# Patient Record
Sex: MALE | Race: WHITE | Employment: OTHER | URBAN - METROPOLITAN AREA
[De-identification: names, ages, dates, MRNs, and addresses within clinical notes are randomized per-mention and may not be internally consistent; named-entity substitution may affect disease eponyms.]

---

## 2019-12-03 ENCOUNTER — DOCUMENTATION ONLY (OUTPATIENT)
Dept: CARE COORDINATION | Facility: CLINIC | Age: 69
End: 2019-12-03

## 2019-12-03 NOTE — TELEPHONE ENCOUNTER
RECORDS RECEIVED FROM: Lumbar pain/sciatic nerve pain, per pt. Referred by Dr. Delvin Urbina. Records/MRI with [facility]. Pt had lumbar surgery in 2001 with the [facility].   DATE RECEIVED: Dec 10, 2019   NOTES STATUS DETAILS   OFFICE NOTE from referring provider Care Everywhere Delvin Urbina MD     OFFICE NOTE from other specialist N/A    DISCHARGE SUMMARY from hospital N/A    DISCHARGE REPORT from the ER N/A    OPERATIVE REPORT In process    MEDICATION LIST Care Everywhere    IMPLANT RECORD/STICKER In process    LABS     CBC/DIFF N/A    CULTURES N/A    INJECTIONS DONE IN RADIOLOGY N/A    MRI received IN PACS  11/13/2019   ULTRA SOUND RECEIVED IN PACS  09/06/2019 05/13/2019   XRAYS (IMAGES & REPORTS) RECEIVED IN PACS  12/11/2018     TUMOR     PATHOLOGY  Slides & report N/A      12/03/19   3:05 PM   [facility] records scanned urgent to chart  Karina Galloway CMA    12/03/19   8:48 AM   Called [facility], they will fax op note and implant record.  [facility] no longer has spine images.  Called  and asked them to push images.  Karina Galloway CMA    12/05/2019  12:17 pm  RESOLVED IMAGING CALLING [place] ABOUT RECORDS. [facility]

## 2019-12-06 DIAGNOSIS — M54.50 LUMBAR PAIN: Primary | ICD-10-CM

## 2019-12-10 ENCOUNTER — DOCUMENTATION ONLY (OUTPATIENT)
Dept: ORTHOPEDICS | Facility: CLINIC | Age: 69
End: 2019-12-10

## 2019-12-10 ENCOUNTER — OFFICE VISIT (OUTPATIENT)
Dept: SURGERY | Facility: CLINIC | Age: 69
End: 2019-12-10
Payer: COMMERCIAL

## 2019-12-10 ENCOUNTER — ANESTHESIA EVENT (OUTPATIENT)
Dept: SURGERY | Facility: CLINIC | Age: 69
End: 2019-12-10

## 2019-12-10 ENCOUNTER — OFFICE VISIT (OUTPATIENT)
Dept: ORTHOPEDICS | Facility: CLINIC | Age: 69
End: 2019-12-10
Payer: COMMERCIAL

## 2019-12-10 ENCOUNTER — PRE VISIT (OUTPATIENT)
Dept: SURGERY | Facility: CLINIC | Age: 69
End: 2019-12-10

## 2019-12-10 ENCOUNTER — TELEPHONE (OUTPATIENT)
Dept: ORTHOPEDICS | Facility: CLINIC | Age: 69
End: 2019-12-10

## 2019-12-10 ENCOUNTER — ANCILLARY PROCEDURE (OUTPATIENT)
Dept: GENERAL RADIOLOGY | Facility: CLINIC | Age: 69
End: 2019-12-10
Attending: ORTHOPAEDIC SURGERY
Payer: COMMERCIAL

## 2019-12-10 ENCOUNTER — PRE VISIT (OUTPATIENT)
Dept: ORTHOPEDICS | Facility: CLINIC | Age: 69
End: 2019-12-10

## 2019-12-10 VITALS
RESPIRATION RATE: 16 BRPM | HEART RATE: 81 BPM | HEIGHT: 72 IN | SYSTOLIC BLOOD PRESSURE: 153 MMHG | OXYGEN SATURATION: 99 % | TEMPERATURE: 98.2 F | BODY MASS INDEX: 23.3 KG/M2 | DIASTOLIC BLOOD PRESSURE: 93 MMHG | WEIGHT: 172 LBS

## 2019-12-10 VITALS — WEIGHT: 175 LBS | HEIGHT: 72 IN | BODY MASS INDEX: 23.7 KG/M2

## 2019-12-10 DIAGNOSIS — M54.16 LUMBAR RADICULOPATHY: Primary | ICD-10-CM

## 2019-12-10 DIAGNOSIS — M54.16 LUMBAR RADICULOPATHY: ICD-10-CM

## 2019-12-10 DIAGNOSIS — M48.062 SPINAL STENOSIS OF LUMBAR REGION WITH NEUROGENIC CLAUDICATION: ICD-10-CM

## 2019-12-10 DIAGNOSIS — Z01.818 PREOP EXAMINATION: ICD-10-CM

## 2019-12-10 LAB
ANION GAP SERPL CALCULATED.3IONS-SCNC: 3 MMOL/L (ref 3–14)
BUN SERPL-MCNC: 25 MG/DL (ref 7–30)
CALCIUM SERPL-MCNC: 9.2 MG/DL (ref 8.5–10.1)
CHLORIDE SERPL-SCNC: 106 MMOL/L (ref 94–109)
CO2 SERPL-SCNC: 29 MMOL/L (ref 20–32)
CREAT SERPL-MCNC: 0.93 MG/DL (ref 0.66–1.25)
ERYTHROCYTE [DISTWIDTH] IN BLOOD BY AUTOMATED COUNT: 12.8 % (ref 10–15)
GFR SERPL CREATININE-BSD FRML MDRD: 81 ML/MIN/{1.73_M2}
GLUCOSE SERPL-MCNC: 95 MG/DL (ref 70–99)
HCT VFR BLD AUTO: 43.2 % (ref 40–53)
HGB BLD-MCNC: 14.5 G/DL (ref 13.3–17.7)
MCH RBC QN AUTO: 33 PG (ref 26.5–33)
MCHC RBC AUTO-ENTMCNC: 33.6 G/DL (ref 31.5–36.5)
MCV RBC AUTO: 98 FL (ref 78–100)
MRSA DNA SPEC QL NAA+PROBE: NEGATIVE
PLATELET # BLD AUTO: 199 10E9/L (ref 150–450)
POTASSIUM SERPL-SCNC: 4.4 MMOL/L (ref 3.4–5.3)
RBC # BLD AUTO: 4.39 10E12/L (ref 4.4–5.9)
SODIUM SERPL-SCNC: 137 MMOL/L (ref 133–144)
SPECIMEN SOURCE: NORMAL
WBC # BLD AUTO: 5.6 10E9/L (ref 4–11)

## 2019-12-10 RX ORDER — MULTIPLE VITAMINS W/ MINERALS TAB 9MG-400MCG
1 TAB ORAL EVERY MORNING
COMMUNITY

## 2019-12-10 RX ORDER — ACETAMINOPHEN 325 MG/1
325-650 TABLET ORAL EVERY 6 HOURS PRN
Status: ON HOLD | COMMUNITY
End: 2020-06-18

## 2019-12-10 RX ORDER — GABAPENTIN 300 MG/1
300 CAPSULE ORAL AT BEDTIME
Status: ON HOLD | COMMUNITY
Start: 2019-11-04 | End: 2020-06-19

## 2019-12-10 RX ORDER — SODIUM PHOSPHATE,MONO-DIBASIC 19G-7G/118
1 ENEMA (ML) RECTAL
COMMUNITY

## 2019-12-10 RX ORDER — COVID-19 ANTIGEN TEST
220 KIT MISCELLANEOUS 2 TIMES DAILY WITH MEALS
Status: ON HOLD | COMMUNITY
Start: 2013-04-25 | End: 2020-06-19

## 2019-12-10 RX ORDER — LIDOCAINE 4 G/G
1 PATCH TOPICAL EVERY 24 HOURS
COMMUNITY
End: 2019-12-11

## 2019-12-10 SDOH — HEALTH STABILITY: MENTAL HEALTH: HOW OFTEN DO YOU HAVE 6 OR MORE DRINKS ON ONE OCCASION?: DAILY OR ALMOST DAILY

## 2019-12-10 SDOH — HEALTH STABILITY: MENTAL HEALTH: HOW MANY STANDARD DRINKS CONTAINING ALCOHOL DO YOU HAVE ON A TYPICAL DAY?: 3 OR 4

## 2019-12-10 ASSESSMENT — MIFFLIN-ST. JEOR
SCORE: 1577.06
SCORE: 1586.79

## 2019-12-10 ASSESSMENT — ENCOUNTER SYMPTOMS
BACK PAIN: 1
STIFFNESS: 1
DEPRESSION: 1
INSOMNIA: 0
NERVOUS/ANXIOUS: 1
DECREASED CONCENTRATION: 1
PANIC: 0

## 2019-12-10 ASSESSMENT — LIFESTYLE VARIABLES: TOBACCO_USE: 1

## 2019-12-10 ASSESSMENT — PAIN SCALES - GENERAL: PAINLEVEL: MODERATE PAIN (4)

## 2019-12-10 ASSESSMENT — PATIENT HEALTH QUESTIONNAIRE - PHQ9
SUM OF ALL RESPONSES TO PHQ QUESTIONS 1-9: 12
SUM OF ALL RESPONSES TO PHQ QUESTIONS 1-9: 12
10. IF YOU CHECKED OFF ANY PROBLEMS, HOW DIFFICULT HAVE THESE PROBLEMS MADE IT FOR YOU TO DO YOUR WORK, TAKE CARE OF THINGS AT HOME, OR GET ALONG WITH OTHER PEOPLE: SOMEWHAT DIFFICULT

## 2019-12-10 NOTE — PATIENT INSTRUCTIONS
Preparing for Your Surgery      Name:  Tyson Fregoso   MRN:  [id number]   :  1950   Today's Date:  12/10/2019     Arriving for surgery:  Surgery date:  2020  Arrival time:  10:15 am  Please come to:     [facility] [address]  [address] [place]  [address]    - parking is available in front of [facility] from 5:15AM to 8:00PM. If you prefer, park your car in the Green Lot.    -Proceed to the 3rd floor, check in at the Adult Surgery Waiting Lounge. [phone]    If an escort is needed stop at the Information Desk in the lobby. Inform the information person that you are here for surgery. An escort to the Adult Surgery Waiting Lounge will be provided.    What can I eat or drink?  -  You may have solid food or milk products until 8 hours prior to your surgery.(4 am)  -  You may have water, apple juice or 7up/Sprite until 2 hours prior to your surgery.(until 10:15 am arrival time)    Which medicines can I take?        Stop Aspirin, vitamins and supplements one week prior to surgery.      Hold Ibuprofen for 24 hours and/or Naproxen for 48 hours prior to surgery.     -  Please take these medications the day of surgery:  NONE      How do I prepare myself?  -  Take two showers: one the night before surgery; and one the morning of surgery.         Use Scrubcare or Hibiclens to wash from neck down, leave soap on your skin for up to one minute.        Do not get soap in your eyes or ears.  You may use your own shampoo and conditioner; no other hair products.   -  Do NOT use lotion, powder, deodorant, or antiperspirant the day of your surgery.  -  Do NOT wear any makeup, fingernail polish or jewelry.  - Do not bring your own medications to the hospital, except for inhalers and eye   drops.  -  Bring your ID and insurance card.        Questions or Concerns:  -Please call the Pre Admission Nursing Office at [phone].         -For questions after  surgery please call your surgeons office.       AFTER YOUR SURGERY  Breathing exercises   Breathing exercises help you recover faster. Take deep breaths and let the air out slowly. This will:     Help you wake up after surgery.    Help prevent complications like pneumonia.  Preventing complications will help you go home sooner.   We may give you a breathing device (incentive spirometer) to encourage you to breathe deeply.   Nausea and vomiting   You may feel sick to your stomach after surgery; if so, let your nurse know.    Pain control:  After surgery, you may have pain. Our goal is to help you manage your pain. Pain medicine will help you feel comfortable enough to do activities that will help you heal.  These activities may include breathing exercises, walking and physical therapy.   To help your health care team treat your pain we will ask: 1) If you have pain  2) where it is located 3) describe your pain in your words  Methods of pain control include medications given by mouth, vein or by nerve block for some surgeries.  Sequential Compression Device (SCD) or Pneumo Boots:  You may need to wear SCD S on your legs or feet. These are wraps connected to a machine that pumps in air and releases it. The repeated pumping helps prevent blood clots from forming.

## 2019-12-10 NOTE — NURSING NOTE
Teaching Flowsheet   Relevant Diagnosis: L3-L5 Decompression  Teaching Topic: Pre op teaching     Person(s) involved in teaching:   Patient     Motivation Level:  Asks Questions: Yes  Eager to Learn: Yes  Cooperative: Yes  Receptive (willing/able to accept information): Yes  Any cultural factors/Alevism beliefs that may influence understanding or compliance? No       Patient demonstrates understanding of the following:  Reason for the appointment, diagnosis and treatment plan: Yes  Knowledge of proper use of medications and conditions for which they are ordered (with special attention to potential side effects or drug interactions): Yes  Which situations necessitate calling provider and whom to contact: Yes       Teaching Concerns Addressed: RN discussed all aspects of pre op surgery including pre op shower, location, NPO status and post surgery pain mgmt. Obtained MRSA sample. Right nares. Sent to maria g Hernandez schedule surgery dates and PAC appt. Patient has low tolerance with hard narcotics and expressed that he would rather just use Tynelol for pain mgmt. RN provided education with pain mgmt. RN also explained to patient should the MRSA swab comes back positive, RN will call patient to give him instructions. Patient has no further questions.     Proper use and care of meds (medical equip, care aids, etc.): Yes  Nutritional needs and diet plan: Yes  Pain management techniques: Yes  Wound Care: Yes  How and/when to access community resources: Yes     Instructional Materials Used/Given: Pre op packet and antibacterial soap.     Time spent with patient: 15 minutes.

## 2019-12-10 NOTE — PROGRESS NOTES
Spine Surgery Consultation    REFERRING PHYSICIAN: Delvin Urbina   PRIMARY CARE PHYSICIAN: No primary care provider on file.           Chief Complaint:   Consult (lumbar and sciatic nerve pain )      History of Present Illness:  Symptom Profile Including: location of symptoms, onset, severity, exacerbating/alleviating factors, previous treatments:        Tyson Fregoso is a 69 year old male who presents today for evaluation of left L4 and L5 distribution radiculopathy.  He thinks the symptoms have been going on for about a year, but progressively worsening particularly over the last 2 to 3 months.  He cannot pinpoint an exact event that started it.  It seemed to come on gradually.  It consists of a burning and tingling type pain down the lateral leg down the front of the knee and then down the medial leg as well as onto the top of the foot.  It is particularly worse when he is standing and walking.  He says he cannot stand to live this way anymore.  He is done physical therapy within the last few months and this seemed to exacerbate the symptoms.  He also did a series of epidural injections and this did not really help him.  He is tried Tylenol and anti-inflammatories without much relief.  He feels like the left leg is weak.    Of note he had a previous right sided microdiscectomy in 2001 for acute foot drop from a large disc herniation.  He is not really sure which level this was but he notes that it was a miraculous surgery and it really helped him..          Past Medical History:   No past medical history on file.         Past Surgical History:   No past surgical history on file.         Social History:     Social History     Tobacco Use     Smoking status: Never Smoker     Smokeless tobacco: Never Used   Substance Use Topics     Alcohol use: Not on file            Family History:   No family history on file.         Allergies:   No Known Allergies         Medications:     Current Outpatient  Medications   Medication     gabapentin (NEURONTIN) 300 MG capsule     naproxen sodium 220 MG capsule     No current facility-administered medications for this visit.              Review of Systems:     A 10 point ROS was performed and reviewed. Specific responses to these questions are noted at the end of the document.         Physical Exam:   Vitals: Ht 1.829 m (6')   Wt 79.4 kg (175 lb)   BMI 23.73 kg/m    Constitutional: awake, alert, cooperative, no apparent distress, appears stated age.    Eyes: The sclera are white.  Ears, Nose, Throat: The trachea is midline.  Psychiatric: The patient has a normal affect.  Respiratory: breathing non-labored  Cardiovascular: The extremities are warm and perfused.  Skin: no obvious rashes or lesions.  Musculoskeletal, Neurologic, and Spine:            Lumbar Spine:    Appearance - No gross stepoffs or deformities    Motor -     L2-3: Hip flexion 5/5 R and 5/5 L strength          L3/4:  Knee extension R 5/5 and L 5/5 strength         L4/5:  Foot dorsiflexion R 5/5 L 4/5 and       EHL dorsiflexion R 4/5 L 3/5 strength         S1:  Plantarflexion/Peroneal Muscles  R 5/5 and L 5/5 strength    Sensation: intact to light touch L3-S1 distribution BLE, diminished left L4 and L5,     Positive left strait leg raise      Neurologic:      REFLEXES Left Right                  Patella 1+ 1+   Ankle jerk 1+ 1+   Babinski No upgoing great toe No upgoing great toe   Clonus 0 beats 0 beats     Hip Exam:  No pain with hip log roll and no tenderness over the greater trochanters.    Alignment:  Patient stands with a neutral standing sagittal balance.           Imaging:   We ordered and independently reviewed new radiographs at this clinic visit. The results were discussed with the patient.  Findings include:    Standing lumbar radiographs December 10, 2019 show severe multilevel degenerative changes across heavily visualized segment particularly at L5-S1 where there is complete disc space height  loss     lumbar MRI November 13, 2019 again shows multilevel lumbar spondylosis severe left lateral recess stenosis at L3-4 in the setting of paracentral disc herniation and facet hypertrophy causes severe impingement of the traversing left L4 nerve root moderate right lateral recess stenosis at L3-4 as well moderate left stenosis at L4-5 from facet joint hypertrophy although the worst area does seem to be the L3-4 segment moderate to severe right-sided foraminal stenosis L3-4 and L4-5 not on the left             Assessment and Plan:   Assessment:  69 year old male with L3-4 central and lateral recess stenosis as well as left paracentral disc herniation, and left lateral recess stenosis at L4-5.  He does have right-sided foraminal stenosis L3-4 and L4-5, but no right leg symptoms.     Plan:  1. At this point the patient has failed conservative management with therapy injections and oral medications including Tylenol and anti-inflammatories.  He feels quite disabled with the symptoms.  Based on this, I recommended a left L3-4 discectomy and L3-4 decompression as well as a left L4-5 hemilaminotomy to address the lateral recess stenosis at those levels.  Although he does have right-sided foraminal stenosis, he is not having any right leg symptoms and thus I do not think it is warranted to do a fusion at this time.  2. Risks of this surgery include risk of infection, risk of dural tear resulting in CSF leak which might result in headaches, or possible need for lumbar drain, or possible revision surgery in the setting of a persistent leak. Risk of seroma or hematoma requiring revision surgery. Possible nerve root injury resulting in numbness weakness or paralysis into the leg. Possible radiculitis which could result in similar symptoms or could result in significant neurogenic type pain into the leg. Risk of incomplete decompression which might require revision surgery in the future.  Risk of pars fracture or  postoperative instability requiring conversion to a fusion in the future. Risk of adjacent segment problems requiring surgery in the future. Risk of incomplete relief of symptoms possibly requiring revision surgery in the future. There is a risk of blood clots in the legs or the lungs.  Furthermore, although rare, there are risks of major vessel or major organ injury from the surgery, and risks of the anesthetic including stroke heart attack and death.  3. We talked about the expected recovery and I explained 6 to 12 weeks of decreased activities to try and let the muscles heal.  I also explained the risk of needing a fusion in the future if his arthritis or foraminal stenosis were to worsen, but he is very opposed to a fusion and I think given that he does not have any right leg symptoms and there is no instability that it is very reasonable to proceed with a decompression alone at this time.  4. He is a  of this procedure and the recovery given that he had a previous discectomy.  He would like to proceed and I will get things arranged for him.      Respectfully,  Jan Ward MD  Spine Surgery  [facility]      Answers for HPI/ROS submitted by the patient on 12/10/2019   If you checked off any problems, how difficult have these problems made it for you to do your work, take care of things at home, or get along with other people?: Somewhat difficult  PHQ9 TOTAL SCORE: 12

## 2019-12-10 NOTE — TELEPHONE ENCOUNTER
FUTURE VISIT INFORMATION      SURGERY INFORMATION:    Date: 20    Location: [facility] OR    Surgeon:  Jan Ward    Anesthesia Type:  General    RECORDS REQUESTED FROM:       Primary Care Provider: Logan Cordova MD- [facility]    Most recent EKG+ Tracin14- [facility]- requested tracing

## 2019-12-10 NOTE — LETTER
12/10/2019       RE: Tyson Fregoso  [address] [id number]     Dear Colleague,    Thank you for referring your patient, Tyson Fregoso, to the [facility] at [facility]. Please see a copy of my visit note below.    Spine Surgery Consultation    REFERRING PHYSICIAN: Delvin Urbina   PRIMARY CARE PHYSICIAN: No primary care provider on file.           Chief Complaint:   Consult (lumbar and sciatic nerve pain )      History of Present Illness:  Symptom Profile Including: location of symptoms, onset, severity, exacerbating/alleviating factors, previous treatments:        Tyson Fregoso is a 69 year old male who presents today for evaluation of left L4 and L5 distribution radiculopathy.  He thinks the symptoms have been going on for about a year, but progressively worsening particularly over the last 2 to 3 months.  He cannot pinpoint an exact event that started it.  It seemed to come on gradually.  It consists of a burning and tingling type pain down the lateral leg down the front of the knee and then down the medial leg as well as onto the top of the foot.  It is particularly worse when he is standing and walking.  He says he cannot stand to live this way anymore.  He is done physical therapy within the last few months and this seemed to exacerbate the symptoms.  He also did a series of epidural injections and this did not really help him.  He is tried Tylenol and anti-inflammatories without much relief.  He feels like the left leg is weak.    Of note he had a previous right sided microdiscectomy in 2001 for acute foot drop from a large disc herniation.  He is not really sure which level this was but he notes that it was a miraculous surgery and it really helped him..          Past Medical History:   No past medical history on file.         Past Surgical History:   No past surgical history on file.         Social History:      Social History     Tobacco Use     Smoking status: Never Smoker     Smokeless tobacco: Never Used   Substance Use Topics     Alcohol use: Not on file            Family History:   No family history on file.         Allergies:   No Known Allergies         Medications:     Current Outpatient Medications   Medication     gabapentin (NEURONTIN) 300 MG capsule     naproxen sodium 220 MG capsule     No current facility-administered medications for this visit.              Review of Systems:     A 10 point ROS was performed and reviewed. Specific responses to these questions are noted at the end of the document.         Physical Exam:   Vitals: Ht 1.829 m (6')   Wt 79.4 kg (175 lb)   BMI 23.73 kg/m     Constitutional: awake, alert, cooperative, no apparent distress, appears stated age.    Eyes: The sclera are white.  Ears, Nose, Throat: The trachea is midline.  Psychiatric: The patient has a normal affect.  Respiratory: breathing non-labored  Cardiovascular: The extremities are warm and perfused.  Skin: no obvious rashes or lesions.  Musculoskeletal, Neurologic, and Spine:            Lumbar Spine:    Appearance - No gross stepoffs or deformities    Motor -     L2-3: Hip flexion 5/5 R and 5/5 L strength          L3/4:  Knee extension R 5/5 and L 5/5 strength         L4/5:  Foot dorsiflexion R 5/5 L 4/5 and       EHL dorsiflexion R 4/5 L 3/5 strength         S1:  Plantarflexion/Peroneal Muscles  R 5/5 and L 5/5 strength    Sensation: intact to light touch L3-S1 distribution BLE, diminished left L4 and L5,     Positive left strait leg raise      Neurologic:      REFLEXES Left Right                  Patella 1+ 1+   Ankle jerk 1+ 1+   Babinski No upgoing great toe No upgoing great toe   Clonus 0 beats 0 beats     Hip Exam:  No pain with hip log roll and no tenderness over the greater trochanters.    Alignment:  Patient stands with a neutral standing sagittal balance.           Imaging:   We ordered and independently  reviewed new radiographs at this clinic visit. The results were discussed with the patient.  Findings include:    Standing lumbar radiographs December 10, 2019 show severe multilevel degenerative changes across heavily visualized segment particularly at L5-S1 where there is complete disc space height loss     lumbar MRI November 13, 2019 again shows multilevel lumbar spondylosis severe left lateral recess stenosis at L3-4 in the setting of paracentral disc herniation and facet hypertrophy causes severe impingement of the traversing left L4 nerve root moderate right lateral recess stenosis at L3-4 as well moderate left stenosis at L4-5 from facet joint hypertrophy although the worst area does seem to be the L3-4 segment moderate to severe right-sided foraminal stenosis L3-4 and L4-5 not on the left             Assessment and Plan:   Assessment:  69 year old male with L3-4 central and lateral recess stenosis as well as left paracentral disc herniation, and left lateral recess stenosis at L4-5.  He does have right-sided foraminal stenosis L3-4 and L4-5, but no right leg symptoms.     Plan:  1. At this point the patient has failed conservative management with therapy injections and oral medications including Tylenol and anti-inflammatories.  He feels quite disabled with the symptoms.  Based on this, I recommended a left L3-4 discectomy and L3-4 decompression as well as a left L4-5 hemilaminotomy to address the lateral recess stenosis at those levels.  Although he does have right-sided foraminal stenosis, he is not having any right leg symptoms and thus I do not think it is warranted to do a fusion at this time.  2. Risks of this surgery include risk of infection, risk of dural tear resulting in CSF leak which might result in headaches, or possible need for lumbar drain, or possible revision surgery in the setting of a persistent leak. Risk of seroma or hematoma requiring revision surgery. Possible nerve root injury  resulting in numbness weakness or paralysis into the leg. Possible radiculitis which could result in similar symptoms or could result in significant neurogenic type pain into the leg. Risk of incomplete decompression which might require revision surgery in the future.  Risk of pars fracture or postoperative instability requiring conversion to a fusion in the future. Risk of adjacent segment problems requiring surgery in the future. Risk of incomplete relief of symptoms possibly requiring revision surgery in the future. There is a risk of blood clots in the legs or the lungs.  Furthermore, although rare, there are risks of major vessel or major organ injury from the surgery, and risks of the anesthetic including stroke heart attack and death.  3. We talked about the expected recovery and I explained 6 to 12 weeks of decreased activities to try and let the muscles heal.  I also explained the risk of needing a fusion in the future if his arthritis or foraminal stenosis were to worsen, but he is very opposed to a fusion and I think given that he does not have any right leg symptoms and there is no instability that it is very reasonable to proceed with a decompression alone at this time.  4. He is a  of this procedure and the recovery given that he had a previous discectomy.  He would like to proceed and I will get things arranged for him.      Respectfully,  Jan Ward MD  Spine Surgery  [facility]      Answers for HPI/ROS submitted by the patient on 12/10/2019   If you checked off any problems, how difficult have these problems made it for you to do your work, take care of things at home, or get along with other people?: Somewhat difficult  PHQ9 TOTAL SCORE: 12      Again, thank you for allowing me to participate in the care of your patient.      Sincerely,    Jan Ward MD

## 2019-12-10 NOTE — ANESTHESIA PREPROCEDURE EVALUATION
Anesthesia Pre-Procedure Evaluation    Patient: Tyson Fregoso   MRN:     [id number] Gender:   male   Age:    69 year old :      1950        Preoperative Diagnosis: * No surgery found *        No past medical history on file.   Past Surgical History:   Procedure Laterality Date     microdiscectomy      L4-5     SHOULDER SURGERY Bilateral     arthroscopies (Left , right )          Anesthesia Evaluation     . Pt has had prior anesthetic. Type: General, Regional and MAC           ROS/MED HX    ENT/Pulmonary: Comment: 8 pk yr cigarette hx, quit     9 yr hx smoking cigars, quit     (+)tobacco use, Past use 1/2 x17 yrs packs/day  , . .   (-) asthma and sleep apnea   Neurologic: Comment: Hx drop foot prior to lumbar surgery in     (+)neuropathy - LEs, lumbar radiculopathy,     Cardiovascular:     (+) Dyslipidemia, ----. : . . . :. . Previous cardiac testing date:results:date: results:ECG reviewed date: results:Sinus kathy, 52 bpm, otherwise normal date: results:          METS/Exercise Tolerance: Comment: Able to walk one mile. Was more active until a month ago when lumbar radiculopathy worsened. Stretches daily. Plays golf frequently in warmer weather. 3 - Able to walk 1-2 blocks without stopping   Hematologic:  - neg hematologic  ROS       Musculoskeletal: Comment: S/P L4-5 microdiscectomy     Acute lumbar radiculopathy    S/P B/L shoulder arthroscopies    Chronic left knee pain    Left clavicle congenital deformity        GI/Hepatic:  - neg GI/hepatic ROS       Renal/Genitourinary:  - ROS Renal section negative       Endo: Comment: Had steroid injection in hip in spring & fall. Also had 5 day course of prednisone.        Psychiatric:  - neg psychiatric ROS       Infectious Disease:  - neg infectious disease ROS       Malignancy:      - no malignancy   Other:    (+) H/O Chronic Pain,                       PHYSICAL EXAM:   Mental Status/Neuro: A/A/O; Age Appropriate   Airway:  "Facies: Feasible  Mallampati: I  Mouth/Opening: Full  TM distance: > 6 cm  Neck ROM: Full   Respiratory: Auscultation: CTAB     Resp. Rate: Normal     Resp. Effort: Normal      CV: Rhythm: Regular  Rate: Age appropriate  Heart: Normal Sounds  Edema: None   Comments:      Dental: Normal Dentition                LABS:  CBC:   Lab Results   Component Value Date    HGB 14.6 10/04/2007     BMP:   Lab Results   Component Value Date    POTASSIUM 4.1 10/04/2007     COAGS: No results found for: PTT, INR, FIBR  POC: No results found for: BGM, HCG, HCGS  OTHER: No results found for: PH, LACT, A1C, MARCO, PHOS, MAG, ALBUMIN, PROTTOTAL, ALT, AST, GGT, ALKPHOS, BILITOTAL, BILIDIRECT, LIPASE, AMYLASE, CHAVA, TSH, T4, T3, CRP, SED     Preop Vitals    BP Readings from Last 3 Encounters:   12/10/19 (!) 153/93    Pulse Readings from Last 3 Encounters:   12/10/19 81      Resp Readings from Last 3 Encounters:   12/10/19 16    SpO2 Readings from Last 3 Encounters:   12/10/19 99%      Temp Readings from Last 1 Encounters:   12/10/19 98.2  F (36.8  C) (Oral)    Ht Readings from Last 1 Encounters:   12/10/19 1.835 m (6' 0.24\")      Wt Readings from Last 1 Encounters:   12/10/19 78 kg (172 lb)    Estimated body mass index is 23.17 kg/m  as calculated from the following:    Height as of this encounter: 1.835 m (6' 0.24\").    Weight as of this encounter: 78 kg (172 lb).     LDA:        JZG FV AN PLAN NO PONV RULE       PAC Discussion and Assessment    ASA Classification: 2  Case is suitable for: [place]  Anesthetic techniques and relevant risks discussed: GA  Invasive monitoring and risk discussed: No  Types:   Possibility and Risk of blood transfusion discussed: No  NPO instructions given:   Additional anesthetic preparation and risks discussed:   Needs early admission to pre-op area:   Other:     PAC Resident/NP Anesthesia Assessment:  Tyson Fregoso is a 69 year old male scheduled to undergo Lumbar 3 to 4 decompression and discectomy " and left Lumbar 4-5 Hemilaminectomy with Jan Ward MD on 1/8/20 at [facility] for treatment of Lumbar radiculopathy and Spinal stenosis of lumbar region with neurogenic claudication.    Pt has had prior anesthetic. Type: General, Regional and MAC - no complications per pt    He has the following specific operative considerations:   # AMBER 2/8 = low risk  # VTE risk: 0.5%  # Risk of PONV score = 2.  If > 2, anti-emetic intervention recommended.    # Anesthesia considerations:  Refer to PAC assessment in anesthesia records    CARDIAC: METS 3,  Able to walk one mile. Was more active until a month ago when lumbar radiculopathy worsened. Stretches daily. Plays golf frequently in warmer weather.     # RCRI : No serious cardiac risks.  0.4% risk of major adverse cardiac event.       PULMONARY:     # Former smoker, 8 pk yr hx, quit 1992 (cigarettes)    # Former smoker, 9 yr hx, quit 2018 (cigars)    # No asthma or inhaler use    GI: no GERD      ENDO: BMI 24    # No DM    ORTHO: full neck ROM, no TMJ    # s/p L4-5 microdiscectomy 2001    # Acute lumbar radiculopathy    Patient is optimized and is acceptable candidate for the proposed procedure, provided labs today are within acceptable range. No further diagnostic evaluation is needed.        Reviewed and Signed by PAC Mid-Level Provider/Resident  Mid-Level Provider/Resident: Crista Paul PA-C  Date: 12/10/19  Time: 1531    Attending Anesthesiologist Anesthesia Assessment:        Anesthesiologist:   Date:   Time:   Pass/Fail:   Disposition:     PAC Pharmacist Assessment:        Pharmacist:   Date:   Time:    Crista Paul PA-C

## 2019-12-10 NOTE — PROGRESS NOTES
Patient is scheduled for surgery with Dr. Ward    Spoke or left message with: Patient and wife in exam room    Date of Surgery: 1/8/20    Location: [facility]    Post op: 6 weeks, scheduled    Pre-op with surgeon (if applicable): Complete    H&P: Scheduled with PAC 12/10/19    Additional imaging/appointments: N/A    Surgery packet: Received in clinic     Additional comments: Patient on surgery wait list per his request

## 2019-12-10 NOTE — H&P
Pre-Operative H & P     CC:  Preoperative exam to assess for increased cardiopulmonary risk while undergoing surgery and anesthesia.    Date of Encounter: 12/10/2019  Primary Care Physician:  No primary care provider on file.  Reason for Visit: Lumbar radiculopathy, Spinal stenosis of lumbar region with neurogenic claudication    HPI  Tyson Fregoso is a 68 y/o male who presents for pre-operative H&P in preparation for Lumbar 3 to 4 decompression and discectomy and left Lumbar 4-5 Hemilaminectomy with Jan Ward MD on 1/8/20 at [facility] for treatment of Lumbar radiculopathy and Spinal stenosis of lumbar region with neurogenic claudication.    Mr. Fregoso has had left L4 and L5 distribution radiculopathy for approx. one year, that has progressively worsened particularly over the last 2 to 3 months.  Onset was gradual.  It consists of a burning and tingling type pain down the lateral leg down the front of the knee and then down the medial leg as well as onto the top of the foot, exacerbated by standing and walking.  He has trialed PT and epidural injections without much improvement.  He is tried Tylenol and anti-inflammatories without much relief.  He endorses left leg weakness. His symptoms are interfering with his ADLs. He now presents for the above procedure.     PMH is also significant for right sided microdiscectomy in 2001 for acute foot drop from a large disc herniation, B/L shoulder arthroscopies, 8 pk yr smoking hx (quit 1992), 9 yr cigar smoking hx (quit 2018).    History was obtained from patient & chart review.     Past Medical History  No past medical history on file.    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 Also had 5 day course of prednisone.    Personal or FH with difficulty with Anesthesia:  no    Prior to Admission Medications  Current Outpatient Medications   Medication Sig Dispense Refill     acetaminophen (TYLENOL) 325 MG tablet Take 325-650 mg by mouth every 6 hours as needed for mild pain       calcium carb-cholecalciferol 600-500 MG-UNIT CAPS Take 1 tablet by mouth daily (with lunch)       gabapentin (NEURONTIN) 300 MG capsule Take 300 mg by mouth At Bedtime        glucosamine-chondroitin 500-400 MG CAPS per capsule Take 1 capsule by mouth daily (with lunch)       Lidocaine (LIDOCARE) 4 % Patch Place 1 patch onto the skin every 24 hours To prevent lidocaine toxicity, patient should be patch free for 12 hrs daily.       multivitamin w/minerals (MULTI-VITAMIN) tablet Take 1 tablet by mouth every morning       naproxen sodium 220 MG capsule Take 220 mg by mouth 2 times daily (with meals)          Allergies  No Known Allergies    Social History  Social History     Socioeconomic History     Marital status:      Spouse name: Not on file     Number of children: Not on file     Years of education: Not on file     Highest education level: Not on file   Occupational History     Not on file   Social Needs     Financial resource strain: Not on file     Food insecurity:     Worry: Not on file     Inability: Not on file     Transportation needs:     Medical: Not on file     Non-medical: Not on file   Tobacco Use     Smoking status: Former Smoker     Packs/day: 0.50     Years: 17.00     Pack years: 8.50     Last attempt to quit:      Years since quittin.9     Smokeless tobacco: Never Used   Substance and Sexual Activity     Alcohol use: Yes     Alcohol/week: 3.0 standard drinks     Types: 3 Cans of beer per  week     Drinks per session: 3 or 4     Binge frequency: Daily or almost daily     Drug use: Not on file     Sexual activity: Not on file   Lifestyle     Physical activity:     Days per week: Not on file     Minutes per session: Not on file     Stress: Not on file   Relationships     Social connections:     Talks on phone: Not on file     Gets together: Not on file     Attends Druze service: Not on file     Active member of club or organization: Not on file     Attends meetings of clubs or organizations: Not on file     Relationship status: Not on file     Intimate partner violence:     Fear of current or ex partner: Not on file     Emotionally abused: Not on file     Physically abused: Not on file     Forced sexual activity: Not on file   Other Topics Concern     Not on file   Social History Narrative     Not on file       Family History  Family History   Problem Relation Age of Onset     Deep Vein Thrombosis Brother      Deep Vein Thrombosis (DVT) Son      Anesthesia Reaction No family hx of      Cardiovascular No family hx of        ROS/MED HX  The complete review of systems is negative other than noted in the HPI or here.  Patient denies recent illness, fever and respiratory infection during past month.    ENT/Pulmonary: Comment: 8 pk yr cigarette hx, quit 1992    9 yr hx smoking cigars, quit 2018    (+)tobacco use, Past use 1/2 x17 yrs packs/day  , . .   (-) asthma and sleep apnea   Neurologic: Comment: Hx drop foot prior to lumbar surgery in 2001    (+)neuropathy - LEs, lumbar radiculopathy,     Cardiovascular:     (+) Dyslipidemia, ----. : . . . :. . Previous cardiac testing date:results:date: results:ECG reviewed date:2013 results:Sinus kathy, 52 bpm, otherwise normal date: results:          METS/Exercise Tolerance: Comment: Able to walk one mile. Was more active until a month ago when lumbar radiculopathy worsened. Stretches daily. Plays golf frequently in warmer weather. 3 - Able to walk 1-2 blocks  "without stopping   Hematologic:  - neg hematologic  ROS       Musculoskeletal: Comment: S/P L4-5 microdiscectomy 2001    Acute lumbar radiculopathy    S/P B/L shoulder arthroscopies    Chronic left knee pain    Left clavicle congenital deformity        GI/Hepatic:  - neg GI/hepatic ROS       Renal/Genitourinary:  - ROS Renal section negative       Endo: Comment: Had steroid injection in hip in spring & fall. Also had 5 day course of prednisone.        Psychiatric:  - neg psychiatric ROS       Infectious Disease:  - neg infectious disease ROS       Malignancy:      - no malignancy   Other:    (+) H/O Chronic Pain,             PHYSICAL EXAM:   Mental Status/Neuro: A/A/O; Age Appropriate   Airway: Facies: Feasible  Mallampati: I  Mouth/Opening: Full  TM distance: > 6 cm  Neck ROM: Full   Respiratory: Auscultation: CTAB     Resp. Rate: Normal     Resp. Effort: Normal      CV: Rhythm: Regular  Rate: Age appropriate  Heart: Normal Sounds  Edema: None   Comments:      Dental: Normal Dentition            Preop Vitals    BP Readings from Last 3 Encounters:   12/10/19 (!) 153/93    Pulse Readings from Last 3 Encounters:   12/10/19 81      Resp Readings from Last 3 Encounters:   12/10/19 16    SpO2 Readings from Last 3 Encounters:   12/10/19 99%      Temp Readings from Last 1 Encounters:   12/10/19 98.2  F (36.8  C) (Oral)    Ht Readings from Last 1 Encounters:   12/10/19 1.835 m (6' 0.24\")      Wt Readings from Last 1 Encounters:   12/10/19 78 kg (172 lb)    Estimated body mass index is 23.17 kg/m  as calculated from the following:    Height as of this encounter: 1.835 m (6' 0.24\").    Weight as of this encounter: 78 kg (172 lb).       Temp: 98.2  F (36.8  C) Temp src: Oral BP: (!) 153/93 Pulse: 81   Resp: 16 SpO2: 99 %         172 lbs 0 oz  6' .244\"   Body mass index is 23.17 kg/m .    Physical Exam  Constitutional: Awake, alert, cooperative, no apparent distress, and appears stated age. Pt is standing due to lumbar " radiculopathy symptoms.  Eyes: Pupils equal, round and reactive to light, extra ocular muscles intact, sclera clear, conjunctiva normal.  HENT: Normocephalic, oral pharynx with moist mucus membranes, good dentition. No goiter appreciated. No removable dental hardware.  Respiratory: Clear to auscultation bilaterally, no crackles or wheezing. No SOB when supine.  Cardiovascular: Regular rate and rhythm, normal S1 and S2, and no murmur noted.  Carotids +2, no bruits. No edema. Palpable pulses to radial, DP and PT arteries.   GI: Normal bowel sounds, soft, non-distended, non-tender, no masses palpated, no hepatomegaly.    Lymph/Hematologic: No cervical lymphadenopathy and no supraclavicular lymphadenopathy.  Genitourinary:  deferred  Skin: Warm and dry.  No rashes at anticipated surgical site.   Musculoskeletal: Full ROM of neck. There is no redness, warmth, or swelling of the joints. Gross motor strength is normal.  Lumbar surgical scar is well healed.  Neurologic: Awake, alert, oriented to name, place and time. Cranial nerves II-XII are grossly intact. Gait is normal. Ambulates from chair to exam table, seats self, lies supine and sits back up w/o assistance.  Neuropsychiatric: Calm, cooperative. Normal affect. Pleasant. Answers questions appropriately, follows commands w/o difficulty.    PRIOR LABS/DIAGNOSTIC STUDIES:  All labs and imaging personally reviewed    MRI SPINE 11/13/19  IMPRESSION:  1.  Multilevel degenerative findings as above with central canal stenosis most notable at L3-L4 (severe).  2.  Foraminal stenosis most significant at L4-L5 (moderate to severe) on the right greater than left and also at L3-L4 on the right (moderate to severe).    EKG 2013  Sinus bradycardia  Otherwise normal ECG  No previous ECGs available  Ventricular rate 52 bpm    Labs today: (personally reviewed)  BMP, CBC, MRSA    Outside records reviewed from: Care Everywhere    ASSESSMENT and PLAN  Tyson Fregoso is a 69 year old  male scheduled to undergo Lumbar 3 to 4 decompression and discectomy and left Lumbar 4-5 Hemilaminectomy with Jan Ward MD on 1/8/20 at [facility] for treatment of Lumbar radiculopathy and Spinal stenosis of lumbar region with neurogenic claudication.    Pt has had prior anesthetic. Type: General, Regional and MAC - no complications per pt    He has the following specific operative considerations:   # AMBER 2/8 = low risk  # VTE risk: 0.5%  # Risk of PONV score = 2.  If > 2, anti-emetic intervention recommended.    # Anesthesia considerations:  Refer to PAC assessment in anesthesia records    CARDIAC: METS 3,  Able to walk one mile. Was more active until a month ago when lumbar radiculopathy worsened. Stretches daily. Plays golf frequently in warmer weather.     # RCRI : No serious cardiac risks.  0.4% risk of major adverse cardiac event.       PULMONARY:     # Former smoker, 8 pk yr hx, quit 1992 (cigarettes)    # Former smoker, 9 yr hx, quit 2018 (cigars)    # No asthma or inhaler use    GI: no GERD      ENDO: BMI 24    # No DM    ORTHO: full neck ROM, no TMJ    # s/p L4-5 microdiscectomy 2001    # Acute lumbar radiculopathy    Patient is optimized and is acceptable candidate for the proposed procedure, provided labs today are within acceptable range. No further diagnostic evaluation is needed.    Arrival time, NPO, shower and medication instructions provided by nursing staff today.  Preparing For Your Surgery handout given.    Crista Paul PA-C  Preoperative Assessment Center  [facility]  Phone: [phone]  Fax: [phone]    ADDENDUM 12/11/19:  Nasal swab MRSA Negative: SA Positive. Prescription for mupirocin sent to pts pharmacy to be utilized x5 days prior to surgery.    Crista Paul PA-C  Preoperative Assessment Center  [facility] [place]

## 2019-12-10 NOTE — TELEPHONE ENCOUNTER
RN unable to call or leave message to patient as patient's number is invalid. RN reported that to Mary and Mary send a note to PAC to ask patient to update his number. Rn is hoping to tell patient that he is positive for SA and he should take mupirocin for both nostrils 5 days straight leading up to surgery.    Stefan Queen RN

## 2019-12-10 NOTE — NURSING NOTE
Reason For Visit:   Chief Complaint   Patient presents with     Consult     lumbar and sciatic nerve pain        Primary MD: No primary care provider on file.  Ref. MD: Ciara    ?  No  Occupation retired.  Currently working? No.  Work status?  Retired.  Date of injury:   Type of injury: chrionic .  Date of surgery: 2001  Type of surgery: discectomy?  Smoker: No  Request smoking cessation information: No    Ht 1.829 m (6')   Wt 79.4 kg (175 lb)   BMI 23.73 kg/m           Oswestry (JIGNA) Questionnaire    No flowsheet data found.         Neck Disability Index (NDI) Questionnaire    No flowsheet data found.                Promis 10 Assessment    No flowsheet data found.             Xu Briones, ATC

## 2019-12-11 ENCOUNTER — TELEPHONE (OUTPATIENT)
Dept: SURGERY | Facility: CLINIC | Age: 69
End: 2019-12-11

## 2019-12-11 ENCOUNTER — TELEPHONE (OUTPATIENT)
Dept: ORTHOPEDICS | Facility: CLINIC | Age: 69
End: 2019-12-11

## 2019-12-11 DIAGNOSIS — M54.16 LUMBAR RADICULOPATHY: Primary | ICD-10-CM

## 2019-12-11 RX ORDER — LIDOCAINE 4 G/G
1 PATCH TOPICAL EVERY 24 HOURS
Qty: 3 PATCH | Refills: 0 | Status: SHIPPED | OUTPATIENT
Start: 2019-12-11 | End: 2019-12-11

## 2019-12-11 RX ORDER — MUPIROCIN 20 MG/G
OINTMENT TOPICAL
Qty: 22 G | Refills: 0 | Status: SHIPPED | OUTPATIENT
Start: 2019-12-11 | End: 2020-06-17

## 2019-12-11 RX ORDER — LIDOCAINE 50 MG/G
1 PATCH TOPICAL EVERY 24 HOURS
Qty: 3 PATCH | Refills: 0 | Status: ON HOLD | OUTPATIENT
Start: 2019-12-11 | End: 2020-01-09

## 2019-12-11 ASSESSMENT — PATIENT HEALTH QUESTIONNAIRE - PHQ9: SUM OF ALL RESPONSES TO PHQ QUESTIONS 1-9: 12

## 2019-12-11 NOTE — TELEPHONE ENCOUNTER
[facility] Call Center    Phone Message    May a detailed message be left on voicemail: no    Reason for Call: Other: Pt would like to prescribed the Lidocaine patch for his back. Pharmacy used is [facility] . If any questions or concerns call pt as he would also like a call if the prescription is sent     Action Taken: Message routed to:  [facility] ([facility]): Ortho

## 2019-12-11 NOTE — ADDENDUM NOTE
Addendum  created 12/11/19 0851 by Crista Paul PA-C    Clinical Note Signed, Diagnosis association updated, Order list changed, Visit diagnoses modified

## 2019-12-11 NOTE — TELEPHONE ENCOUNTER
[facility] Call Center    Phone Message    May a detailed message be left on voicemail: yes    Reason for Call: Medication Question or concern regarding medication   Prescription Clarification  Name of Medication: Lidocaine (LIDOCARE) 4 % Patch  Prescribing Provider: Dr Ward   Pharmacy: [id number] [address]   What on the order needs clarification? Pt stated the patch is supposed to be 5% not 4% please update and send back to [facility]           Action Taken: Message routed to:  [facility] ([facility]): Ortho

## 2019-12-11 NOTE — TELEPHONE ENCOUNTER
PT called back regarding the Mupirocim. PT stated that [facility] said that the ointment should not be put in the nostrils. Crista was notified and the PT was told to disregard the [facility] [employer] warning and to go by the instructions given to him and the instructions that were written on the Prescription.     Johnson Hawkins on 12/11/2019 at 1:13 PM

## 2019-12-11 NOTE — TELEPHONE ENCOUNTER
The pt is requesting a Rx for lidocaine patchs, I stated it would be best for this pt request this from Dr. Ward's clinic.  The pt stated understanding.    JAYSON Dexter LPN

## 2019-12-11 NOTE — TELEPHONE ENCOUNTER
MRSA and MSSA     Hello, I'm Mira camacho LPN, calling from the Preoperative Assessment Center to discuss the results of the nasal swab that you had when you were in the clinic. Reminding you that the swab is testing for staph aureaus, a bacteria normally found on your skin, but that could possibly cause infection in your wound site. There are some strains of staph that are easly treated with antibiotics and others are more resistent.     Your swab results showed that you have    SA Positive  Our clinic is teaming up with your surgeon to help prevent infection for your surgery. When we find this bacteria we ask you to do two things:  1. Apply a small amount (blueberry size) ointment using a clean cotton swab just inside each nostril twice daily for 5 days  before your surgery. Then close or pinch your nostrils with your fingers and massage and release for a few minutes to distribute the ointment throughout the nostrils. This is a medication that we will send to your local pharmacy.     Which pharmacy would you like us to send this to? [facility] [place], [place]    The side effects of this ointment are mild and can include burning or stinging, congestion or stuffy nose, cough or taste changes. If you experience unpleasant side effects you may stop using the ointment.     2. While you are at the pharmacy, please also  some extra surgical shower soap like you were given while in clinic (chlorhexidine, Hibiclens). A small bottle should be enough. The pharmacy staff can direct you where to find it. Please shower for 5 days in a row before surgery including the night before and morning of surgery as you were instructed. Do not use on face, eyes or ears. If you experience skin irritation switch to a standard bath soap without perfumes or lotions and continue the shower routine.     JAYSON Dexter LPN

## 2019-12-11 NOTE — TELEPHONE ENCOUNTER
RN called and explained to patient that we can prescribed lidocaine patches but it will take days to weeks because typically it will require PA. RN explained to patient to get some OTC lidocaine patches as they are equally the same while wait for the PA. Patient verbalized understanding.    Stefan Queen RN

## 2019-12-16 ENCOUNTER — TELEPHONE (OUTPATIENT)
Dept: ORTHOPEDICS | Facility: CLINIC | Age: 69
End: 2019-12-16

## 2019-12-16 NOTE — TELEPHONE ENCOUNTER
[facility] Call Center    Phone Message    May a detailed message be left on voicemail: yes    Reason for Call: Other: Rubén is requesting a call back to discuss having his surgery sooner. He states the pain is too much for him to handle and is becoming worse. Please reach out to discuss.      Action Taken: Message routed to:  [facility] ([facility] Ortho

## 2019-12-16 NOTE — TELEPHONE ENCOUNTER
RN called patient. Explained to patient Dr. Ward's schedule is really booked out but we can put him on cancelation list. Also told patient that if he is in intractable pain, she should seek treatment in the emergency room. Patient verbalized understanding.    Stefan Queen RN

## 2020-01-07 ENCOUNTER — TELEPHONE (OUTPATIENT)
Dept: ORTHOPEDICS | Facility: CLINIC | Age: 70
End: 2020-01-07

## 2020-01-07 NOTE — TELEPHONE ENCOUNTER
RN returned patient's call. Reiterated to patient it could be 1 to 3 nights stay depending how well he recover from surgery. Also explained to him that we can help refill his pain meds. Patient verbalized understanding.    Stefan Queen RN

## 2020-01-07 NOTE — TELEPHONE ENCOUNTER
[facility] Call Center    Phone Message    May a detailed message be left on voicemail: yes    Reason for Call: Other: Pt is requesting a call back to discuss about how long he may be in the hospital after surgery. Please advise.     Action Taken: Message routed to:  [facility] ([facility]): Ortho

## 2020-01-08 ENCOUNTER — APPOINTMENT (OUTPATIENT)
Dept: GENERAL RADIOLOGY | Facility: CLINIC | Age: 70
DRG: 520 | End: 2020-01-08
Attending: ORTHOPAEDIC SURGERY
Payer: COMMERCIAL

## 2020-01-08 ENCOUNTER — HOSPITAL ENCOUNTER (INPATIENT)
Facility: CLINIC | Age: 70
LOS: 1 days | Discharge: HOME | DRG: 520 | End: 2020-01-09
Attending: ORTHOPAEDIC SURGERY | Admitting: ORTHOPAEDIC SURGERY
Payer: COMMERCIAL

## 2020-01-08 ENCOUNTER — ANESTHESIA EVENT (OUTPATIENT)
Dept: SURGERY | Facility: CLINIC | Age: 70
DRG: 520 | End: 2020-01-08
Payer: COMMERCIAL

## 2020-01-08 ENCOUNTER — ANESTHESIA (OUTPATIENT)
Dept: SURGERY | Facility: CLINIC | Age: 70
DRG: 520 | End: 2020-01-08
Payer: COMMERCIAL

## 2020-01-08 DIAGNOSIS — M54.16 LUMBAR RADICULOPATHY: ICD-10-CM

## 2020-01-08 DIAGNOSIS — M48.062 SPINAL STENOSIS OF LUMBAR REGION WITH NEUROGENIC CLAUDICATION: ICD-10-CM

## 2020-01-08 DIAGNOSIS — G89.18 POST-OP PAIN: Primary | ICD-10-CM

## 2020-01-08 PROBLEM — Z98.890 POST-OPERATIVE STATE: Status: ACTIVE | Noted: 2020-01-08

## 2020-01-08 LAB — GLUCOSE BLDC GLUCOMTR-MCNC: 110 MG/DL (ref 70–99)

## 2020-01-08 PROCEDURE — 36000064 ZZH SURGERY LEVEL 4 EA 15 ADDTL MIN - UMMC: Performed by: ORTHOPAEDIC SURGERY

## 2020-01-08 PROCEDURE — 12000001 ZZH R&B MED SURG/OB UMMC

## 2020-01-08 PROCEDURE — 25000128 H RX IP 250 OP 636: Performed by: PHYSICIAN ASSISTANT

## 2020-01-08 PROCEDURE — 00000146 ZZHCL STATISTIC GLUCOSE BY METER IP

## 2020-01-08 PROCEDURE — 71000014 ZZH RECOVERY PHASE 1 LEVEL 2 FIRST HR: Performed by: ORTHOPAEDIC SURGERY

## 2020-01-08 PROCEDURE — 25000128 H RX IP 250 OP 636: Performed by: NURSE ANESTHETIST, CERTIFIED REGISTERED

## 2020-01-08 PROCEDURE — 01NB0ZZ RELEASE LUMBAR NERVE, OPEN APPROACH: ICD-10-PCS | Performed by: ORTHOPAEDIC SURGERY

## 2020-01-08 PROCEDURE — 25000125 ZZHC RX 250: Performed by: NURSE ANESTHETIST, CERTIFIED REGISTERED

## 2020-01-08 PROCEDURE — 25000132 ZZH RX MED GY IP 250 OP 250 PS 637: Performed by: PHYSICIAN ASSISTANT

## 2020-01-08 PROCEDURE — 40000170 ZZH STATISTIC PRE-PROCEDURE ASSESSMENT II: Performed by: ORTHOPAEDIC SURGERY

## 2020-01-08 PROCEDURE — 93010 ELECTROCARDIOGRAM REPORT: CPT | Mod: 59 | Performed by: INTERNAL MEDICINE

## 2020-01-08 PROCEDURE — 37000009 ZZH ANESTHESIA TECHNICAL FEE, EACH ADDTL 15 MIN: Performed by: ORTHOPAEDIC SURGERY

## 2020-01-08 PROCEDURE — 25000128 H RX IP 250 OP 636: Performed by: STUDENT IN AN ORGANIZED HEALTH CARE EDUCATION/TRAINING PROGRAM

## 2020-01-08 PROCEDURE — 25000128 H RX IP 250 OP 636: Performed by: ORTHOPAEDIC SURGERY

## 2020-01-08 PROCEDURE — 72020 X-RAY EXAM OF SPINE 1 VIEW: CPT

## 2020-01-08 PROCEDURE — 27210794 ZZH OR GENERAL SUPPLY STERILE: Performed by: ORTHOPAEDIC SURGERY

## 2020-01-08 PROCEDURE — 25000566 ZZH SEVOFLURANE, EA 15 MIN: Performed by: ORTHOPAEDIC SURGERY

## 2020-01-08 PROCEDURE — 37000008 ZZH ANESTHESIA TECHNICAL FEE, 1ST 30 MIN: Performed by: ORTHOPAEDIC SURGERY

## 2020-01-08 PROCEDURE — 25000125 ZZHC RX 250: Performed by: ORTHOPAEDIC SURGERY

## 2020-01-08 PROCEDURE — 40000985 XR LUMBAR SPINE PORT 1 VW

## 2020-01-08 PROCEDURE — 40000065 ZZH STATISTIC EKG NON-CHARGEABLE

## 2020-01-08 PROCEDURE — 0SB20ZZ EXCISION OF LUMBAR VERTEBRAL DISC, OPEN APPROACH: ICD-10-PCS | Performed by: ORTHOPAEDIC SURGERY

## 2020-01-08 PROCEDURE — 25800030 ZZH RX IP 258 OP 636: Performed by: NURSE ANESTHETIST, CERTIFIED REGISTERED

## 2020-01-08 PROCEDURE — 25000132 ZZH RX MED GY IP 250 OP 250 PS 637: Performed by: STUDENT IN AN ORGANIZED HEALTH CARE EDUCATION/TRAINING PROGRAM

## 2020-01-08 PROCEDURE — 36000066 ZZH SURGERY LEVEL 4 W FLUORO 1ST 30 MIN - UMMC: Performed by: ORTHOPAEDIC SURGERY

## 2020-01-08 RX ORDER — METOCLOPRAMIDE HYDROCHLORIDE 5 MG/ML
5 INJECTION INTRAMUSCULAR; INTRAVENOUS EVERY 6 HOURS PRN
Status: DISCONTINUED | OUTPATIENT
Start: 2020-01-08 | End: 2020-01-09 | Stop reason: HOSPADM

## 2020-01-08 RX ORDER — ACETAMINOPHEN 325 MG/1
975 TABLET ORAL EVERY 8 HOURS
Status: DISCONTINUED | OUTPATIENT
Start: 2020-01-08 | End: 2020-01-09 | Stop reason: HOSPADM

## 2020-01-08 RX ORDER — ACETAMINOPHEN 325 MG/1
975 TABLET ORAL ONCE
Status: COMPLETED | OUTPATIENT
Start: 2020-01-08 | End: 2020-01-08

## 2020-01-08 RX ORDER — CEFAZOLIN SODIUM 2 G/100ML
2 INJECTION, SOLUTION INTRAVENOUS
Status: COMPLETED | OUTPATIENT
Start: 2020-01-08 | End: 2020-01-08

## 2020-01-08 RX ORDER — AMOXICILLIN 250 MG
1 CAPSULE ORAL 2 TIMES DAILY
Status: DISCONTINUED | OUTPATIENT
Start: 2020-01-08 | End: 2020-01-09 | Stop reason: HOSPADM

## 2020-01-08 RX ORDER — AMOXICILLIN 250 MG
2 CAPSULE ORAL 2 TIMES DAILY
Status: DISCONTINUED | OUTPATIENT
Start: 2020-01-08 | End: 2020-01-09 | Stop reason: HOSPADM

## 2020-01-08 RX ORDER — ONDANSETRON 2 MG/ML
4 INJECTION INTRAMUSCULAR; INTRAVENOUS EVERY 6 HOURS PRN
Status: DISCONTINUED | OUTPATIENT
Start: 2020-01-08 | End: 2020-01-09 | Stop reason: HOSPADM

## 2020-01-08 RX ORDER — ONDANSETRON 2 MG/ML
4 INJECTION INTRAMUSCULAR; INTRAVENOUS EVERY 30 MIN PRN
Status: DISCONTINUED | OUTPATIENT
Start: 2020-01-08 | End: 2020-01-08

## 2020-01-08 RX ORDER — ONDANSETRON 4 MG/1
4 TABLET, ORALLY DISINTEGRATING ORAL EVERY 6 HOURS PRN
Status: DISCONTINUED | OUTPATIENT
Start: 2020-01-08 | End: 2020-01-09 | Stop reason: HOSPADM

## 2020-01-08 RX ORDER — HYDROCODONE BITARTRATE AND ACETAMINOPHEN 5; 325 MG/1; MG/1
1 TABLET ORAL EVERY 4 HOURS PRN
Qty: 42 TABLET | Refills: 0 | Status: SHIPPED | OUTPATIENT
Start: 2020-01-08 | End: 2020-01-09

## 2020-01-08 RX ORDER — BUPIVACAINE HYDROCHLORIDE AND EPINEPHRINE 2.5; 5 MG/ML; UG/ML
INJECTION, SOLUTION INFILTRATION; PERINEURAL PRN
Status: DISCONTINUED | OUTPATIENT
Start: 2020-01-08 | End: 2020-01-08 | Stop reason: HOSPADM

## 2020-01-08 RX ORDER — CEFAZOLIN SODIUM 1 G/3ML
1 INJECTION, POWDER, FOR SOLUTION INTRAMUSCULAR; INTRAVENOUS SEE ADMIN INSTRUCTIONS
Status: DISCONTINUED | OUTPATIENT
Start: 2020-01-08 | End: 2020-01-08 | Stop reason: HOSPADM

## 2020-01-08 RX ORDER — SODIUM CHLORIDE, SODIUM LACTATE, POTASSIUM CHLORIDE, CALCIUM CHLORIDE 600; 310; 30; 20 MG/100ML; MG/100ML; MG/100ML; MG/100ML
INJECTION, SOLUTION INTRAVENOUS CONTINUOUS
Status: DISCONTINUED | OUTPATIENT
Start: 2020-01-08 | End: 2020-01-09 | Stop reason: HOSPADM

## 2020-01-08 RX ORDER — VANCOMYCIN HYDROCHLORIDE 1 G/20ML
INJECTION, POWDER, LYOPHILIZED, FOR SOLUTION INTRAVENOUS PRN
Status: DISCONTINUED | OUTPATIENT
Start: 2020-01-08 | End: 2020-01-08 | Stop reason: HOSPADM

## 2020-01-08 RX ORDER — GABAPENTIN 100 MG/1
100 CAPSULE ORAL
Status: COMPLETED | OUTPATIENT
Start: 2020-01-08 | End: 2020-01-08

## 2020-01-08 RX ORDER — OXYCODONE HYDROCHLORIDE 5 MG/1
5-10 TABLET ORAL EVERY 4 HOURS PRN
Status: DISCONTINUED | OUTPATIENT
Start: 2020-01-08 | End: 2020-01-09 | Stop reason: HOSPADM

## 2020-01-08 RX ORDER — DIPHENHYDRAMINE HYDROCHLORIDE 50 MG/ML
12.5 INJECTION INTRAMUSCULAR; INTRAVENOUS EVERY 6 HOURS PRN
Status: DISCONTINUED | OUTPATIENT
Start: 2020-01-08 | End: 2020-01-09 | Stop reason: HOSPADM

## 2020-01-08 RX ORDER — HYDROMORPHONE HYDROCHLORIDE 1 MG/ML
.3-.5 INJECTION, SOLUTION INTRAMUSCULAR; INTRAVENOUS; SUBCUTANEOUS
Status: DISCONTINUED | OUTPATIENT
Start: 2020-01-08 | End: 2020-01-09 | Stop reason: HOSPADM

## 2020-01-08 RX ORDER — NALOXONE HYDROCHLORIDE 0.4 MG/ML
.1-.4 INJECTION, SOLUTION INTRAMUSCULAR; INTRAVENOUS; SUBCUTANEOUS
Status: DISCONTINUED | OUTPATIENT
Start: 2020-01-08 | End: 2020-01-08

## 2020-01-08 RX ORDER — FENTANYL CITRATE 50 UG/ML
INJECTION, SOLUTION INTRAMUSCULAR; INTRAVENOUS PRN
Status: DISCONTINUED | OUTPATIENT
Start: 2020-01-08 | End: 2020-01-08

## 2020-01-08 RX ORDER — MEPERIDINE HYDROCHLORIDE 25 MG/ML
12.5 INJECTION INTRAMUSCULAR; INTRAVENOUS; SUBCUTANEOUS
Status: DISCONTINUED | OUTPATIENT
Start: 2020-01-08 | End: 2020-01-08

## 2020-01-08 RX ORDER — CEFAZOLIN SODIUM 1 G/3ML
1 INJECTION, POWDER, FOR SOLUTION INTRAMUSCULAR; INTRAVENOUS EVERY 8 HOURS
Status: COMPLETED | OUTPATIENT
Start: 2020-01-09 | End: 2020-01-09

## 2020-01-08 RX ORDER — DEXAMETHASONE SODIUM PHOSPHATE 4 MG/ML
INJECTION, SOLUTION INTRA-ARTICULAR; INTRALESIONAL; INTRAMUSCULAR; INTRAVENOUS; SOFT TISSUE PRN
Status: DISCONTINUED | OUTPATIENT
Start: 2020-01-08 | End: 2020-01-08

## 2020-01-08 RX ORDER — NALOXONE HYDROCHLORIDE 0.4 MG/ML
.1-.4 INJECTION, SOLUTION INTRAMUSCULAR; INTRAVENOUS; SUBCUTANEOUS
Status: DISCONTINUED | OUTPATIENT
Start: 2020-01-08 | End: 2020-01-09 | Stop reason: HOSPADM

## 2020-01-08 RX ORDER — METHOCARBAMOL 500 MG/1
500 TABLET, FILM COATED ORAL 4 TIMES DAILY PRN
Status: DISCONTINUED | OUTPATIENT
Start: 2020-01-08 | End: 2020-01-09 | Stop reason: HOSPADM

## 2020-01-08 RX ORDER — FENTANYL CITRATE 50 UG/ML
25-50 INJECTION, SOLUTION INTRAMUSCULAR; INTRAVENOUS
Status: DISCONTINUED | OUTPATIENT
Start: 2020-01-08 | End: 2020-01-08

## 2020-01-08 RX ORDER — SODIUM CHLORIDE, SODIUM LACTATE, POTASSIUM CHLORIDE, CALCIUM CHLORIDE 600; 310; 30; 20 MG/100ML; MG/100ML; MG/100ML; MG/100ML
INJECTION, SOLUTION INTRAVENOUS CONTINUOUS PRN
Status: DISCONTINUED | OUTPATIENT
Start: 2020-01-08 | End: 2020-01-08

## 2020-01-08 RX ORDER — PROPOFOL 10 MG/ML
INJECTION, EMULSION INTRAVENOUS PRN
Status: DISCONTINUED | OUTPATIENT
Start: 2020-01-08 | End: 2020-01-08

## 2020-01-08 RX ORDER — PROCHLORPERAZINE MALEATE 5 MG
5 TABLET ORAL EVERY 6 HOURS PRN
Status: DISCONTINUED | OUTPATIENT
Start: 2020-01-08 | End: 2020-01-09 | Stop reason: HOSPADM

## 2020-01-08 RX ORDER — KETOROLAC TROMETHAMINE 30 MG/ML
INJECTION, SOLUTION INTRAMUSCULAR; INTRAVENOUS PRN
Status: DISCONTINUED | OUTPATIENT
Start: 2020-01-08 | End: 2020-01-08

## 2020-01-08 RX ORDER — ONDANSETRON 2 MG/ML
INJECTION INTRAMUSCULAR; INTRAVENOUS PRN
Status: DISCONTINUED | OUTPATIENT
Start: 2020-01-08 | End: 2020-01-08

## 2020-01-08 RX ORDER — OXYCODONE HYDROCHLORIDE 5 MG/1
5 TABLET ORAL EVERY 4 HOURS PRN
Status: DISCONTINUED | OUTPATIENT
Start: 2020-01-08 | End: 2020-01-08

## 2020-01-08 RX ORDER — EPHEDRINE SULFATE 50 MG/ML
INJECTION, SOLUTION INTRAMUSCULAR; INTRAVENOUS; SUBCUTANEOUS PRN
Status: DISCONTINUED | OUTPATIENT
Start: 2020-01-08 | End: 2020-01-08

## 2020-01-08 RX ORDER — LIDOCAINE 40 MG/G
CREAM TOPICAL
Status: DISCONTINUED | OUTPATIENT
Start: 2020-01-08 | End: 2020-01-09 | Stop reason: HOSPADM

## 2020-01-08 RX ORDER — DIPHENHYDRAMINE HCL 12.5MG/5ML
12.5 LIQUID (ML) ORAL EVERY 6 HOURS PRN
Status: DISCONTINUED | OUTPATIENT
Start: 2020-01-08 | End: 2020-01-09 | Stop reason: HOSPADM

## 2020-01-08 RX ORDER — SODIUM CHLORIDE, SODIUM LACTATE, POTASSIUM CHLORIDE, CALCIUM CHLORIDE 600; 310; 30; 20 MG/100ML; MG/100ML; MG/100ML; MG/100ML
INJECTION, SOLUTION INTRAVENOUS CONTINUOUS
Status: DISCONTINUED | OUTPATIENT
Start: 2020-01-08 | End: 2020-01-08 | Stop reason: HOSPADM

## 2020-01-08 RX ORDER — ACETAMINOPHEN 325 MG/1
650 TABLET ORAL EVERY 4 HOURS PRN
Status: DISCONTINUED | OUTPATIENT
Start: 2020-01-11 | End: 2020-01-09 | Stop reason: HOSPADM

## 2020-01-08 RX ORDER — ONDANSETRON 4 MG/1
4 TABLET, ORALLY DISINTEGRATING ORAL EVERY 30 MIN PRN
Status: DISCONTINUED | OUTPATIENT
Start: 2020-01-08 | End: 2020-01-08

## 2020-01-08 RX ORDER — LIDOCAINE HYDROCHLORIDE 20 MG/ML
INJECTION, SOLUTION INFILTRATION; PERINEURAL PRN
Status: DISCONTINUED | OUTPATIENT
Start: 2020-01-08 | End: 2020-01-08

## 2020-01-08 RX ORDER — METOCLOPRAMIDE 5 MG/1
5 TABLET ORAL EVERY 6 HOURS PRN
Status: DISCONTINUED | OUTPATIENT
Start: 2020-01-08 | End: 2020-01-09 | Stop reason: HOSPADM

## 2020-01-08 RX ADMIN — LIDOCAINE HYDROCHLORIDE 80 MG: 20 INJECTION, SOLUTION INFILTRATION; PERINEURAL at 16:35

## 2020-01-08 RX ADMIN — ROCURONIUM BROMIDE 50 MG: 10 INJECTION INTRAVENOUS at 16:36

## 2020-01-08 RX ADMIN — FENTANYL CITRATE 50 MCG: 50 INJECTION, SOLUTION INTRAMUSCULAR; INTRAVENOUS at 19:11

## 2020-01-08 RX ADMIN — SUGAMMADEX 200 MG: 100 INJECTION, SOLUTION INTRAVENOUS at 18:55

## 2020-01-08 RX ADMIN — FENTANYL CITRATE 75 MCG: 50 INJECTION, SOLUTION INTRAMUSCULAR; INTRAVENOUS at 16:35

## 2020-01-08 RX ADMIN — PROPOFOL 160 MG: 10 INJECTION, EMULSION INTRAVENOUS at 16:35

## 2020-01-08 RX ADMIN — KETOROLAC TROMETHAMINE 15 MG: 30 INJECTION, SOLUTION INTRAMUSCULAR at 18:37

## 2020-01-08 RX ADMIN — ROCURONIUM BROMIDE 20 MG: 10 INJECTION INTRAVENOUS at 17:11

## 2020-01-08 RX ADMIN — FENTANYL CITRATE 25 MCG: 50 INJECTION, SOLUTION INTRAMUSCULAR; INTRAVENOUS at 18:39

## 2020-01-08 RX ADMIN — CEFAZOLIN SODIUM 2 G: 2 INJECTION, SOLUTION INTRAVENOUS at 16:45

## 2020-01-08 RX ADMIN — SODIUM CHLORIDE, POTASSIUM CHLORIDE, SODIUM LACTATE AND CALCIUM CHLORIDE: 600; 310; 30; 20 INJECTION, SOLUTION INTRAVENOUS at 16:26

## 2020-01-08 RX ADMIN — ONDANSETRON 4 MG: 2 INJECTION INTRAMUSCULAR; INTRAVENOUS at 18:35

## 2020-01-08 RX ADMIN — CEFAZOLIN 1 G: 1 INJECTION, POWDER, FOR SOLUTION INTRAMUSCULAR; INTRAVENOUS at 23:52

## 2020-01-08 RX ADMIN — SENNOSIDES AND DOCUSATE SODIUM 2 TABLET: 8.6; 5 TABLET ORAL at 23:08

## 2020-01-08 RX ADMIN — ROCURONIUM BROMIDE 10 MG: 10 INJECTION INTRAVENOUS at 17:55

## 2020-01-08 RX ADMIN — DEXAMETHASONE SODIUM PHOSPHATE 6 MG: 4 INJECTION, SOLUTION INTRAMUSCULAR; INTRAVENOUS at 16:49

## 2020-01-08 RX ADMIN — SODIUM CHLORIDE, POTASSIUM CHLORIDE, SODIUM LACTATE AND CALCIUM CHLORIDE: 600; 310; 30; 20 INJECTION, SOLUTION INTRAVENOUS at 18:41

## 2020-01-08 RX ADMIN — Medication 5 MG: at 18:35

## 2020-01-08 RX ADMIN — ACETAMINOPHEN 975 MG: 325 TABLET, FILM COATED ORAL at 19:54

## 2020-01-08 RX ADMIN — GABAPENTIN 100 MG: 100 CAPSULE ORAL at 10:55

## 2020-01-08 RX ADMIN — FENTANYL CITRATE 50 MCG: 50 INJECTION, SOLUTION INTRAMUSCULAR; INTRAVENOUS at 18:43

## 2020-01-08 RX ADMIN — ACETAMINOPHEN 975 MG: 325 TABLET, FILM COATED ORAL at 10:55

## 2020-01-08 RX ADMIN — ROCURONIUM BROMIDE 20 MG: 10 INJECTION INTRAVENOUS at 18:17

## 2020-01-08 ASSESSMENT — LIFESTYLE VARIABLES: TOBACCO_USE: 1

## 2020-01-08 ASSESSMENT — MIFFLIN-ST. JEOR: SCORE: 1559

## 2020-01-08 NOTE — ANESTHESIA PREPROCEDURE EVALUATION
Anesthesia Pre-Procedure Evaluation    Patient: Tyson Fregoso   MRN:     [id number] Gender:   male   Age:    69 year old :      1950        Preoperative Diagnosis: * No surgery found *        No past medical history on file.   Past Surgical History:   Procedure Laterality Date     microdiscectomy      L4-5     SHOULDER SURGERY Bilateral     arthroscopies (Left , right )          Anesthesia Evaluation     . Pt has had prior anesthetic. Type: General, Regional and MAC           ROS/MED HX    ENT/Pulmonary: Comment: 8 pk yr cigarette hx, quit     9 yr hx smoking cigars, quit     (+)tobacco use, Past use 1/2 x17 yrs packs/day  , . .   (-) asthma and sleep apnea   Neurologic: Comment: Hx drop foot prior to lumbar surgery in     (+)neuropathy - LEs, lumbar radiculopathy,     Cardiovascular:     (+) Dyslipidemia, ----. : . . . :. . Previous cardiac testing date:results:date: results:ECG reviewed date: results:Sinus kathy, 52 bpm, otherwise normal date: results:          METS/Exercise Tolerance: Comment: Able to walk one mile. Was more active until a month ago when lumbar radiculopathy worsened. Stretches daily. Plays golf frequently in warmer weather. 3 - Able to walk 1-2 blocks without stopping   Hematologic:  - neg hematologic  ROS       Musculoskeletal: Comment: S/P L4-5 microdiscectomy     Acute lumbar radiculopathy    S/P B/L shoulder arthroscopies    Chronic left knee pain    Left clavicle congenital deformity        GI/Hepatic:  - neg GI/hepatic ROS       Renal/Genitourinary:  - ROS Renal section negative       Endo: Comment: Had steroid injection in hip in spring & fall. Also had 5 day course of prednisone.        Psychiatric:  - neg psychiatric ROS       Infectious Disease:  - neg infectious disease ROS       Malignancy:      - no malignancy   Other:    (+) H/O Chronic Pain,                       PHYSICAL EXAM:   Mental Status/Neuro: A/A/O; Age Appropriate   Airway:  "Facies: Feasible  Mallampati: I  Mouth/Opening: Full  TM distance: > 6 cm  Neck ROM: Full   Respiratory: Auscultation: CTAB     Resp. Rate: Normal     Resp. Effort: Normal      CV: Rhythm: Regular  Rate: Age appropriate  Heart: Normal Sounds  Edema: None   Comments:      Dental: Normal Dentition                LABS:  CBC:   Lab Results   Component Value Date    WBC 5.6 12/10/2019    HGB 14.5 12/10/2019    HGB 14.6 10/04/2007    HCT 43.2 12/10/2019     12/10/2019     BMP:   Lab Results   Component Value Date     12/10/2019    POTASSIUM 4.4 12/10/2019    POTASSIUM 4.1 10/04/2007    CHLORIDE 106 12/10/2019    CO2 29 12/10/2019    BUN 25 12/10/2019    CR 0.93 12/10/2019    GLC 95 12/10/2019     COAGS: No results found for: PTT, INR, FIBR  POC: No results found for: BGM, HCG, HCGS  OTHER:   Lab Results   Component Value Date    MARCO 9.2 12/10/2019        Preop Vitals    BP Readings from Last 3 Encounters:   12/10/19 (!) 153/93    Pulse Readings from Last 3 Encounters:   12/10/19 81      Resp Readings from Last 3 Encounters:   12/10/19 16    SpO2 Readings from Last 3 Encounters:   12/10/19 99%      Temp Readings from Last 1 Encounters:   12/10/19 36.8  C (98.2  F) (Oral)    Ht Readings from Last 1 Encounters:   12/10/19 1.835 m (6' 0.24\")      Wt Readings from Last 1 Encounters:   12/10/19 78 kg (172 lb)    Estimated body mass index is 23.17 kg/m  as calculated from the following:    Height as of 12/10/19: 1.835 m (6' 0.24\").    Weight as of 12/10/19: 78 kg (172 lb).     LDA:        Assessment:   ASA SCORE: 2            Plan:   Anes. Type:  General   Pre-Medication: None   Induction:  IV (Standard)   Airway: ETT; Oral   Access/Monitoring: PIV   Maintenance: Balanced     Postop Plan:   Postop Pain: Opioids  Postop Sedation/Airway: Not planned  Disposition: Inpatient/Admit     PONV Management:   Adult Risk Factors:, Postop Opioids                  PAC Discussion and Assessment    ASA Classification: 2  Case is " suitable for: [place]  Anesthetic techniques and relevant risks discussed: GA  Invasive monitoring and risk discussed: No  Types:   Possibility and Risk of blood transfusion discussed: No  NPO instructions given:   Additional anesthetic preparation and risks discussed:   Needs early admission to pre-op area:   Other:     PAC Resident/NP Anesthesia Assessment:  Tyson Fregoso is a 69 year old male scheduled to undergo Lumbar 3 to 4 decompression and discectomy and left Lumbar 4-5 Hemilaminectomy with Jan Ward MD on 1/8/20 at [facility] for treatment of Lumbar radiculopathy and Spinal stenosis of lumbar region with neurogenic claudication.    Pt has had prior anesthetic. Type: General, Regional and MAC - no complications per pt    He has the following specific operative considerations:   # AMBER 2/8 = low risk  # VTE risk: 0.5%  # Risk of PONV score = 2.  If > 2, anti-emetic intervention recommended.    # Anesthesia considerations:  Refer to PAC assessment in anesthesia records    CARDIAC: METS 3,  Able to walk one mile. Was more active until a month ago when lumbar radiculopathy worsened. Stretches daily. Plays golf frequently in warmer weather.     # RCRI : No serious cardiac risks.  0.4% risk of major adverse cardiac event.       PULMONARY:     # Former smoker, 8 pk yr hx, quit 1992 (cigarettes)    # Former smoker, 9 yr hx, quit 2018 (cigars)    # No asthma or inhaler use    GI: no GERD      ENDO: BMI 24    # No DM    ORTHO: full neck ROM, no TMJ    # s/p L4-5 microdiscectomy 2001    # Acute lumbar radiculopathy    Patient is optimized and is acceptable candidate for the proposed procedure, provided labs today are within acceptable range. No further diagnostic evaluation is needed.        Reviewed and Signed by PAC Mid-Level Provider/Resident  Mid-Level Provider/Resident: Crista Paul PA-C  Date: 12/10/19  Time: 1531    Attending Anesthesiologist Anesthesia  Assessment:        Anesthesiologist:   Date:   Time:   Pass/Fail:   Disposition:     PAC Pharmacist Assessment:        Pharmacist:   Date:   Time:    Cintia Vanessa MD

## 2020-01-09 ENCOUNTER — APPOINTMENT (OUTPATIENT)
Dept: PHYSICAL THERAPY | Facility: CLINIC | Age: 70
DRG: 520 | End: 2020-01-09
Attending: STUDENT IN AN ORGANIZED HEALTH CARE EDUCATION/TRAINING PROGRAM
Payer: COMMERCIAL

## 2020-01-09 VITALS
OXYGEN SATURATION: 96 % | DIASTOLIC BLOOD PRESSURE: 64 MMHG | TEMPERATURE: 97.5 F | WEIGHT: 168.87 LBS | SYSTOLIC BLOOD PRESSURE: 117 MMHG | RESPIRATION RATE: 16 BRPM | HEIGHT: 72 IN | BODY MASS INDEX: 22.87 KG/M2 | HEART RATE: 59 BPM

## 2020-01-09 LAB — INTERPRETATION ECG - MUSE: NORMAL

## 2020-01-09 PROCEDURE — 25000128 H RX IP 250 OP 636: Performed by: STUDENT IN AN ORGANIZED HEALTH CARE EDUCATION/TRAINING PROGRAM

## 2020-01-09 PROCEDURE — 97530 THERAPEUTIC ACTIVITIES: CPT | Mod: GP | Performed by: PHYSICAL THERAPIST

## 2020-01-09 PROCEDURE — 25000132 ZZH RX MED GY IP 250 OP 250 PS 637: Performed by: STUDENT IN AN ORGANIZED HEALTH CARE EDUCATION/TRAINING PROGRAM

## 2020-01-09 PROCEDURE — 97116 GAIT TRAINING THERAPY: CPT | Mod: GP | Performed by: PHYSICAL THERAPIST

## 2020-01-09 PROCEDURE — 97161 PT EVAL LOW COMPLEX 20 MIN: CPT | Mod: GP | Performed by: PHYSICAL THERAPIST

## 2020-01-09 RX ORDER — HYDROCODONE BITARTRATE AND ACETAMINOPHEN 5; 325 MG/1; MG/1
1 TABLET ORAL EVERY 4 HOURS PRN
Qty: 42 TABLET | Refills: 0 | Status: ON HOLD | OUTPATIENT
Start: 2020-01-09 | End: 2020-06-19

## 2020-01-09 RX ORDER — AMOXICILLIN 250 MG
1 CAPSULE ORAL 2 TIMES DAILY PRN
Qty: 28 TABLET | Refills: 0 | Status: ON HOLD | OUTPATIENT
Start: 2020-01-09 | End: 2020-06-18

## 2020-01-09 RX ADMIN — ACETAMINOPHEN 975 MG: 325 TABLET, FILM COATED ORAL at 10:24

## 2020-01-09 RX ADMIN — CEFAZOLIN 1 G: 1 INJECTION, POWDER, FOR SOLUTION INTRAMUSCULAR; INTRAVENOUS at 09:07

## 2020-01-09 RX ADMIN — ACETAMINOPHEN 975 MG: 325 TABLET, FILM COATED ORAL at 02:45

## 2020-01-09 ASSESSMENT — ACTIVITIES OF DAILY LIVING (ADL)
ADLS_ACUITY_SCORE: 13
ADLS_ACUITY_SCORE: 13
ADLS_ACUITY_SCORE: 14

## 2020-01-09 NOTE — BRIEF OP NOTE
[facility], [place]    Brief Operative Note    Pre-operative diagnosis: Lumbar radiculopathy [M54.16]  Spinal stenosis of lumbar region with neurogenic claudication [M48.062]  Post-operative diagnosis Same    Procedure(s):  Lumbar 3 to 4 decompression and discectomy and left Lumbar 4-5 Hemilaminectomy  Surgeon(s) and Role:     * Jan Ward MD - Primary     * Tree Brown MD - Resident - Assisting  Anesthesia: General   Estimated blood loss: 25 cc  Drains: None  Specimens: * No specimens in log *  Findings:   See operative report  Complications: None  Implants: * No implants in log *      Assessment: Tyson Fregoso is a 69 year old male s/p L3-4 decompression and left discectomy on 1/8/2020 with Dr. Ward.     Plan:  Ortho Primary  Activity: Up with assist until independent. No excessive bending or twisting. No lifting >10 lbs x 6 weeks. No Annalisa lift for transfers.   Weight bearing status: WBAT.  Pain management: Transition from IV to PO as tolerated. No NSAIDs. No steroid.  Antibiotics: Ancef x 1-2 doses postop.  Diet: Begin with clear fluids and progress diet as tolerated.   DVT prophylaxis: SCDs only. No chemical DVT ppx needed. No NSAIDs.  Imaging: None.  Labs: PRN.  Bracing/Splinting: None.  Dressings: Keep Aquacel c/d/i x 5-7 days.  Drains: None.  Avila catheter: None.  Physical Therapy/Occupational Therapy: Mobilization, ambulation, ADLS.  Cultures: None.  Consults: PT/OT - appreciate assistance with patient care.  Follow-up: Clinic with Dr. Ward as scheduled   Disposition: Pending progress with therapies, pain control on orals, and medical stability, anticipate discharge to home on POD #1.    Tree Brown MD, PhD  Orthopedic Surgery PGY4  Pager [phone]

## 2020-01-09 NOTE — OP NOTE
DATE OF SURGERY: 1/8/2020    PREOPERATIVE DIAGNOSIS: Lumbar radiculopathy   Spinal stenosis of lumbar region with neurogenic claudication           POSTOPERATIVE DIAGNOSIS:Same    PROCEDURES:  1. L3 and L4 laminectomy and partial medial facetectomies and foraminotomies for decompression of the L3 and L4 nerve roots.    PRIMARY SURGEON: Jan Ward MD    FIRST ASSISTANT: Tree Brown, PGY4    ANESTHESIA: General Endotracheal    COMPLICATIONS:  None.    SPECIMENS: None.    ESTIMATED BLOOD LOSS: 20 mL    INDICATIONS:                          Tyson Fregoso is a 69 year old male who elected surgical treatment, and understood the indications for this surgery, as well as its risks, benefits, and alternatives as documented in the pre-operative H&P.  Specifically, we reviewed the risks and benefits of the surgery in detail. The risks include, but are not limited to, the general risks associated with anesthesia, including death, pulmonary embolism, DVT, stroke, myocardial infarction, pneumonia, and urinary tract infection. Additional risks specific to the surgery include the risk of infection, dural tear with resultant CSF leak which might necessitate placement of a drain or revision surgery or could result in headaches, nerve injury resulting in weakness or paralysis, risk of adjacent segment disease, the risks of vascular injury, need for revision surgery in the future due to one of the above issues, or risk of incomplete symptom relief. Tyson Fregoso understands the risks of the surgery and wishes to proceed.  No Guarantees were given.       DESCRIPTION OF PROCEDURE:           Tyson Fregoso was taken to the operating  room, where the Anesthesiology Service induced satisfactory general anesthesia.  Ancef was given IV.  Venous thromboembolic prophylaxis was performed with sequential devices.  A Avila catheter was placed under standard sterile techniques.  The patient was placed prone on an open  OSI frame with the abdomen hanging free and all bony prominences well padded.  The low back was then prepped and draped in its entirety in the usual sterile fashion.  We then held a multidisciplinary time out in which we verified the patient, procedure, antibiotics, and operative plan.  All team members were in agreement.    The C-arm was brought into the sterile field to obtain a true lateral view and needles were placed to flaquita the intended point of incision.  We made a midline incision and a bilateral subperiosteal exposure was performed of the L3 through L4 spinous processes and medial lamina.  A needle was placed into the medial facet joint at the caudal most level and a final C-arm image was then obtained to verify our position.     The decompression was performed in the same fashion at each level sequentially including the L3 and L4 levels which resulted in the decompression of the L3 and L4 nerve roots.      For each level, the spinous process was removed with a rongeur. The dorsal cortex of the lamina was then thinned with the rongeur.  We palpated the lateral border of the pars to verify the planned width of the decompression.  I used a cautery to flaquita out the planned limits of the resection to provide a visual reference.  A 4mm round bur was then used to remove the remaining dorsal cortical and cancellous layers.  Eventually, the ligamentum flavum was uncovered by the bur in the midline.  The proximal origin of the ligamentum flavum  and epidural fat were identified. A curette was used to split and elevate the flavum in the midline, exposing the epidural fat underneath.  Kerrison punches were then used to resect the flavum down to the caudad laminar edge.  At this point the central decompression was complete, and I turned my attention to the partial medial facetectomy.  A 5mm strait osteotome was used to resect the remaining overhanging medial facet.  Where necessary the resection was completed with a  kerrison.  This was continued laterally until the medial wall of the pedicle was readily palpable.  I then completed the foraminotomies.  A elva was used to trace out the edge of the pedicle.  I then introduced a 2mm kerrison into the foramen below the pedicle, keeping the shoe of the kerrison facing the nerve root.  Using multiple bites in this way, I was able to remove the remaining facet capsule and osteophytes that were compressing the exiting nerve root.  In the same fashion, I was able to reach out into the foramen above the pedicle and remove any compression on the exiting nerve root above. This process was performed sequentially at each of the levels noted.       In performing the decompression, I found that the left traversing L4 nerve root was very tented, consistent with the pre-op MRI showing a left disc herniation.  I worked lateral to the dura and mobilized it off the disc herniation, and then entered the disc space and removed about 3-4 ml of disc material.  I took a picture for the medical record.  After this the left L4 nerve felt quite free.  I had considered pre-operatively taking more of the L4 lamina, even down to the L4-5 disc space, but I felt I was able to get all of the disc out with this more limited approach at L3-4 only, and thus did not go into the L4-5 level.      The wound was then thoroughly irrigated.  Hemostasis was achieved.  A hemovac drain was not placed.  The wound was closed in layers with vicryl suture, followed by monocryl and dermabond for the skin.  A sterile dressing was applied. The patient was turned supine, extubated, and returned to the recovery area in stable condition.      I was present and scrubbed for the critical portions of the procedure including the exposure and decompression.    Jan Ward MD

## 2020-01-09 NOTE — PLAN OF CARE
Discharge Planner PT   Patient plan for discharge: pt plan is to return home with spouse  Current status: pt evaluation completed. Pt demonstrated log rolling technique and transfers SBA to independent while maintaining spinal precautions. Pt demonstrated gait with SBA to independent with education given on reducing pace for safety and proper turning to avoid twisting in the spine. Pt demonstrated stairs with use of 2 rails and 1 rail on R with SBA to independent with reciprocal pattern and good pace. Pt education given on spinal precautions with verbal understanding given by patient.  Barriers to return to prior living situation: No barriers to return home at this time.  Recommendations for discharge: Pt will discharge to home with recommendation to continue with walking program  Rationale for recommendations: Pt demonstrating good performance with bed mobility, transfers and ambulation. Pt demonstrated good compliance with spinal precautions. Pt will have support at home with spouse.       Entered by: Rubén Vu 01/09/2020 9:40 AM         Physical Therapy Discharge Summary    Reason for therapy discharge:    All goals and outcomes met, no further needs identified.    Progress towards therapy goal(s). See goals on Care Plan in [facility] electronic health record for goal details.  Goals met    Therapy recommendation(s):    Continue home exercise program.

## 2020-01-09 NOTE — ADDENDUM NOTE
Addendum  created 01/08/20 2042 by Arturo Tolentino MD    Order list changed, Order sets accessed

## 2020-01-09 NOTE — PLAN OF CARE
VS:    /50   Pulse 58   Temp 95.9  F (35.5  C) (Oral)   Resp 17   Ht 1.829 m (6')   Wt 76.6 kg (168 lb 14 oz)   SpO2 97%   BMI 22.90 kg/m       Output:    Voids spontaneously without difficulty. LBM 01/09/20   Activity:     Standby assist.    Skin: CDI ex spinal incision.    Pain:    Pt denies pain.    Neuro/CMS:    A&Ox4. CMS/Neuros intact. Denies numbness or tingling in all extremities.   Dressing(s):     CDI aquacell   Diet:    Regular   Equipment:     Hearing aids. IV pump.   IV's/Drains:    PIV L forearm   Plan:    Pt to discharge tomorrow    Additional Info:     Pt refused capno.

## 2020-01-09 NOTE — ANESTHESIA CARE TRANSFER NOTE
Patient: Tyson Fregoso    Procedure(s):  Lumbar 3 to 4 decompression and discectomy and left Lumbar 4-5 Hemilaminectomy    Diagnosis: Lumbar radiculopathy [M54.16]  Spinal stenosis of lumbar region with neurogenic claudication [M48.062]  Diagnosis Additional Information: No value filed.    Anesthesia Type:   General     Note:  Airway :Face Mask  Patient transferred to:PACU  Handoff Report: Identifed the Patient, Identified the Reponsible Provider, Reviewed the pertinent medical history, Discussed the surgical course, Reviewed Intra-OP anesthesia mangement and issues during anesthesia, Set expectations for post-procedure period and Allowed opportunity for questions and acknowledgement of understanding      Vitals: (Last set prior to Anesthesia Care Transfer)    CRNA VITALS  1/8/2020 1833 - 1/8/2020 1916      1/8/2020             EKG:  Sinus bradycardia                Electronically Signed By: Corrine Joseph CRNA, APRN CRNA  January 8, 2020  7:16 PM

## 2020-01-09 NOTE — PLAN OF CARE
VS:   /50   Pulse 58   Temp 95.9  F (35.5  C) (Oral)   Resp 17   Ht 1.829 m (6')   Wt 76.6 kg (168 lb 14 oz)   SpO2 97%   BMI 22.90 kg/m    On room air, Pt refused CAPNO- CAPNO D/Aime.    Output:   Urine occurrence x1. Pt denies difficulty urinating.   Activity:   Up with SBA to bathroom- steady on feet.   Skin: Intact with exception of posterior incision.    Pain:   Pt denies any pain.    Neuro/CMS:   A&Ox4, CMS/neuros intact. Pt denies any numbness/tingling.    Dressing(s):   Posterior dressing CDI.    Diet:   Regular diet, ate 100% of boxed lunch- tolerated well. Denies any N/V.   LDA:   PIV.    Equipment:   PCDs. IV pump.   Plan:   discharge home tomorrow morning.    Additional Info:

## 2020-01-09 NOTE — OR NURSING
PACU to Inpatient Nursing Handoff    Patient Tyson Fregoso is a 69 year old male who speaks English.   Procedure Procedure(s):  Lumbar 3 to 4 decompression and discectomy and left Lumbar 4-5 Hemilaminectomy   Surgeon(s) Primary: Jan Ward MD  Resident - Assisting: Tree Brown MD     No Known Allergies    Isolation  [unfilled]     Past Medical History   has no past medical history on file.    Anesthesia General   Dermatome Level     Preop Meds acetaminophen (Tylenol) - time given: 1055  gabapentin (Neurontin) - time given: 1055   Nerve block Not applicable   Intraop Meds dexamethasone (Decadron)  fentanyl (Sublimaze): 200 mcg total  ketorolac (Toradol): last given at 1837   ondansetron (Zofran): last given at 1835  lidocaine 2%   Local Meds bupivicaine .25% w/ epi   Antibiotics cefazolin (Ancef) - last given at 1645     Pain Patient Currently in Pain: denies  Comfort: negligible pain  Pain Control: fully effective   PACU meds  Not applicable   PCA / epidural No   Capnography Respiratory Monitoring (EtCO2): 38 mmHg  Integrated Pulmonary Index (IPI): 10   Telemetry ECG Rhythm: Sinus rhythm   Inpatient Telemetry Monitor Ordered? No        Labs Glucose Lab Results   Component Value Date    GLC 95 12/10/2019       Hgb Lab Results   Component Value Date    HGB 14.5 12/10/2019       INR No results found for: INR   PACU Imaging Not applicable     Wound/Incision Incision/Surgical Site 01/08/20 Midline;Posterior;Lower Back (Active)   Incision Assessment UTV 1/8/2020  7:07 PM   Closure KAREEN;Liquid bandage;Sutures 1/8/2020  7:07 PM   Incision Drainage Amount None 1/8/2020  7:07 PM   Dressing Intervention Clean, dry, intact 1/8/2020  7:07 PM   Number of days: 0       Incision/Surgical Site 01/08/20 Back (Active)   Number of days: 0      CMS        Equipment ice pack   Other LDA       IV Access Peripheral IV 01/08/20 Upper arm (Active)   Site Assessment WDL 1/8/2020  7:07 PM   Line Status  Infusing 1/8/2020  7:07 PM   Phlebitis Scale 0-->no symptoms 1/8/2020  7:07 PM   Infiltration Scale 0 1/8/2020  7:07 PM   Extravasation? No 1/8/2020 11:10 AM   Dressing Intervention New dressing  1/8/2020 11:10 AM   Number of days: 0      Blood Products Not applicable EBL 20 mL   Intake/Output Date 01/08/20 0700 - 01/09/20 0659   Shift 9016-3108 3098-0292 7892-9831 24 Hour Total   INTAKE   P.O.  60  60   I.V.  1100  1100   Shift Total(mL/kg)  1160(15.14)  1160(15.14)   OUTPUT   Urine  0  0   Blood  20  20   Shift Total(mL/kg)  20(0.26)  20(0.26)   Weight (kg) 76.6 76.6 76.6 76.6      Drains / Avila     Time of void PreOp Void Prior to Procedure: 1220 (01/08/20 1220)    PostOp Urine Occurrence: 1 (01/08/20 1220)    Diapered? No   Bladder Scan     PO 60 mL(water) (01/08/20 1915)  tolerating sips     Vitals    B/P: 128/65  T: 97.2  F (36.2  C)    Temp src: Axillary  P:  Pulse: 58 (01/08/20 1915)    Heart Rate: 57 (01/08/20 1930)     R: 9  O2:  SpO2: 96 %    O2 Device: None (Room air) (01/08/20 1915)    Oxygen Delivery: 6 LPM (01/08/20 1907)         Family/support present significant other   Patient belongings     Patient transported on bed   DC meds/scripts (obs/outpt) Not applicable   Inpatient Pain Meds Released? Yes       Special needs/considerations None   Tasks needing completion None       Letty Madrigal RN  ASCOM *[phone]

## 2020-01-09 NOTE — ANESTHESIA POSTPROCEDURE EVALUATION
Anesthesia POST Procedure Evaluation    Patient: Tyson Fregoso   MRN:     [id number] Gender:   male   Age:    69 year old :      1950        Preoperative Diagnosis: Lumbar radiculopathy [M54.16]  Spinal stenosis of lumbar region with neurogenic claudication [M48.062]   Procedure(s):  Lumbar 3 to 4 decompression and discectomy and left Lumbar 4-5 Hemilaminectomy   Postop Comments: No value filed.       Anesthesia Type:  Not documented  General    Reportable Event: NO     PAIN: Uncomplicated   Sign Out status: Comfortable, Well controlled pain     PONV: No PONV   Sign Out status:  No Nausea or Vomiting     Neuro/Psych: Uneventful perioperative course   Sign Out Status: Preoperative baseline; Age appropriate mentation     Airway/Resp.: Uneventful perioperative course   Sign Out Status: Non labored breathing, age appropriate RR; Resp. Status within EXPECTED Parameters     CV: Uneventful perioperative course   Sign Out status: Appropriate BP and perfusion indices; Appropriate HR/Rhythm     Disposition:   Sign Out in:  PACU  Disposition:  Phase II; Home  Recovery Course: Uneventful  Follow-Up: Not required     Comments/Narrative:  Patient doing well post-operatively.  No significant issues.  Hemodynamically stable, pain well controlled, nausea well controlled.  Stable for discharge from the PACU             Last Anesthesia Record Vitals:  CRNA VITALS  2020 1833 - 2020 1933      2020             EKG:  Sinus bradycardia          Last PACU Vitals:  Vitals Value Taken Time   /70 2020  7:59 PM   Temp 35.9  C (96.7  F) 2020  8:00 PM   Pulse 55 2020  7:59 PM   Resp 12 2020  8:00 PM   SpO2 99 % 2020  8:03 PM   Temp src     NIBP     Pulse     SpO2     Resp     Temp     Ht Rate     Temp 2     Vitals shown include unvalidated device data.      Electronically Signed By: Arturo Tolentino MD, 2020, 8:14 PM

## 2020-01-09 NOTE — PROGRESS NOTES
Orthopaedic Surgery Progress Note 01/09/2020    E: No acute events overnight.    S: POD1. Pain controlled. Denies numbness or tingling. Denies cp/sob/n/v. Tolerating oral diet. -BM +flatus. Voiding spontaneously. Plan of care reviewed and questions answered.    O:  Temp: 95.9  F (35.5  C) Temp src: Oral BP: (!) 147/72 Pulse: 55 Heart Rate: 57 Resp: 14 SpO2: 97 % O2 Device: None (Room air) Oxygen Delivery: 6 LPM    Exam:  Gen: No acute distress, resting comfortably in bed, alert and oriented, cooperative with exam  Cardio: RRR, extremities wwp  Resp: Non-labored breathing  MSK:  Back: Dressing CDI     Lower extremities:  Motor Strength Right Left   Hip flexion: L1, L2, L3 5/5 5/5   Hip adduction: L2, L3 5/5 5/5   Knee flexion: S1 5/5 5/5   Knee extension: L3, L4 5/5 5/5   Ankle dosiflexion: L4, L5 5/5 5/5   EHL: L5 5/5 5/5   Ankle plantarflexion: S1 5/5 5/5      Sensation from L2-S2 is preserved    No lab results found in last 7 days.    Invalid input(s): SEDRATE    Assessment: Tyson Fregoso is a 69 year old male s/p L3-4 decompression and left discectomy on 1/8/2020 with Dr. Ward.      Goals Today:  PT/OT  Home    Plan:  Ortho Primary  Activity: Up with assist until independent. No excessive bending or twisting. No lifting >10 lbs x 6 weeks. No Annalisa lift for transfers.   Weight bearing status: WBAT.  Pain management: Transition from IV to PO as tolerated. No NSAIDs. No steroid.  Antibiotics: Ancef x 1-2 doses postop.  Diet: Begin with clear fluids and progress diet as tolerated.   DVT prophylaxis: SCDs only. No chemical DVT ppx needed. No NSAIDs.  Imaging: None.  Labs: PRN.  Bracing/Splinting: None.  Dressings: Keep Aquacel c/d/i x 5-7 days.  Drains: None.  Avila catheter: None.  Physical Therapy/Occupational Therapy: Mobilization, ambulation, ADLS.  Cultures: None.  Consults: PT/OT - appreciate assistance with patient care.  Follow-up: Clinic with Dr. Ward as scheduled   Disposition: Pending progress  with therapies, pain control on orals, and medical stability, anticipate discharge to home on POD #1.    Future Appointments   Date Time Provider Department Center   1/9/2020  8:45 AM Karla Shetty Pt, PT [facility] [place]   1/9/2020  1:00 PM Karla Shetty Pt, PT [facility] [place]   1/10/2020  8:45 AM Paris Sanders, BENEDICTO [facility] [place]   2/18/2020  1:40 PM Jan Ward MD [facility]       Patient discussed with Dr. Ed Brown MD, PhD  Orthopedic Surgery PGY4  Pager [phone]    Please page me directly with any questions/concerns during regular weekday hours before 5pm. If there is no response, if it is a weekend, or if it is during evening hours then please page the orthopaedic surgery resident on call.

## 2020-01-09 NOTE — PLAN OF CARE
Pt doing well, taking only plain tylenol for pain. Incision CDI, neuros intact. Discharge teaching done on meds, follow up appointments, signs of infection. Pt discharged to home at 11:00.

## 2020-01-10 ENCOUNTER — TELEPHONE (OUTPATIENT)
Dept: ORTHOPEDICS | Facility: CLINIC | Age: 70
End: 2020-01-10

## 2020-01-10 NOTE — TELEPHONE ENCOUNTER
RN returned patient's phone call. Explained to patient the dressing can be removed in 5-7 days. Then monitor the incision for any redness, any drainage or if the incision comes apart. Also look out for signs of fever or chills. Patient verbalized understanding.    Stefan Queen RN

## 2020-01-10 NOTE — TELEPHONE ENCOUNTER
[facility] Call Center    Phone Message    May a detailed message be left on voicemail: no    Reason for Call: Other: The patient would like a call from the care team because he got conflicting information when it comes to his dressing changes/removal the patient said the morning after his surgery 1/8/2020 he said Dr. Ward told him 3 days but the Discharge notes say 5-7 business days please call patient to address dressing concerns thank you.      Action Taken: Message routed to:  [facility] ([facility] Ortho

## 2020-01-10 NOTE — DISCHARGE SUMMARY
[facility], [place]  Orthopaedic Discharge Summary    Patient: Tyson Fregoso MRN# [id number]   Age/Sex: 69 year old male YOB: 1950      Date of Admission:  1/8/2020  Date of Discharge:  1/9/2020  Admitting Physician:  Jan Ward MD  Discharge Physician:  Tree Brown MD  Primary Care Provider:  Akbar Pa    DISCHARGE DIAGNOSIS:  Lumbar radiculopathy [M54.16]  Spinal stenosis of lumbar region with neurogenic claudication [M48.062]    PROCEDURE(S):   1/8/2020 Procedure(s): Lumbar 3 to 4 decompression and discectomy    BRIEF HISTORY:  Tyson Fregoso is a 69 year old male with L3-4 central and lateral recess stenosis as well as left paracentral disc herniation, and left lateral recess stenosis at L4-5.  He has right-sided foraminal stenosis L3-4 and L4-5 but no right leg symptoms.  He elected for the above surgical treatment and understood the indications for this surgery, as well as its risks, benefits, and alternatives as documented in the pre-operative H&P.      HOSPITAL COURSE:    Patient underwent the above stated procedure(s) and was admitted on 1/9/2020. There were no complications and the patient was transferred to the PACU in stable condition.  Patient received routine nursing cares on the floor.  A clear liquid diet was started and advanced to regular on POD1.  PT/OT was initiated on POD1 for safety training, ADLs, mobility and ROM. After demonstrating the ability to tolerate a diet, pain control on PO pain meds, and clearance by PT/OT, patient was deemed medically safe for discharge to home on 1/9/2020.    Antibiotics:  Ancef given preop and 2 doses postop for prophylaxis.  DVT Prophylaxis:  Mechanical.  PT/OT Progress: Has met PT/OT goals for safe mobility.  Pain Medications: Weaned off all IV pain meds by time of discharge  Inpatient Events: No significant events or complications.     DISCHARGE EXAM:  Exam:  Gen: No acute  distress, resting comfortably in bed, alert and oriented, cooperative with exam  Cardio: RRR, extremities wwp  Resp: Non-labored breathing  MSK:  Back: Dressing CDI     Lower extremities:  Motor Strength Right Left   Hip flexion: L1, L2, L3 5/5 5/5   Hip adduction: L2, L3 5/5 5/5   Knee flexion: S1 5/5 5/5   Knee extension: L3, L4 5/5 5/5   Ankle dosiflexion: L4, L5 5/5 5/5   EHL: L5 5/5 5/5   Ankle plantarflexion: S1 5/5 5/5      Sensation from L2-S2 is preserved    DISCHARGE MEDICATIONS AND PROCEDURES:      Discharge Medication List as of 1/9/2020 10:17 AM      START taking these medications    Details   senna-docusate (SENOKOT-S/PERICOLACE) 8.6-50 MG tablet Take 1 tablet by mouth 2 times daily as needed for constipation, Disp-28 tablet, R-0, E-Prescribe         CONTINUE these medications which have CHANGED    Details   HYDROcodone-acetaminophen (NORCO) 5-325 MG tablet Take 1 tablet by mouth every 4 hours as needed for severe pain, Disp-42 tablet, R-0, E-Prescribe         CONTINUE these medications which have NOT CHANGED    Details   acetaminophen (TYLENOL) 325 MG tablet Take 325-650 mg by mouth every 6 hours as needed for mild pain, Historical      gabapentin (NEURONTIN) 300 MG capsule Take 300 mg by mouth At Bedtime , Historical      glucosamine-chondroitin 500-400 MG CAPS per capsule Take 1 capsule by mouth daily (with lunch), Historical      multivitamin w/minerals (MULTI-VITAMIN) tablet Take 1 tablet by mouth every morning, Historical      mupirocin (BACTROBAN) 2 % external ointment Apply to each nare twice daily for 5 days prior to surgery date.Disp-22 g, [id number]      naproxen sodium 220 MG capsule Take 220 mg by mouth 2 times daily (with meals) , Historical         STOP taking these medications       lidocaine (LIDODERM) 5 % patch Comments:   Reason for Stopping:                 Discharge Procedure Orders   Reason for your hospital stay   Order Comments: Spine surgery     Activity   Order Comments:  Your activity upon discharge: No excessive bending, twisting of lifting more than 10 pounds     Order Specific Question Answer Comments   Is discharge order? Yes      Adult [facility] Follow-up and recommended labs and tests   Order Comments: As scheduled with Dr. Ward on 2/18/2020    Appointments on [place] and/or [facility] (with [facility] or [facility] provider or service). Call [phone] if you haven't heard regarding these appointments within 7 days of discharge.     Discharge Instructions   Order Comments: You are being discharged from hospital cares.    Activities:   - Avoid excessive bending, twisting, or heavy lifting over 10 pounds.    Wound care:   - Your dressing is to remain in place until postoperative day 7 after which you may remove.  - Your stitches will dissolve on their own and do not need to be removed.    - You may shower keeping your incisions clean and dry. Once your dressing is off you may shower and let water run over your incisions and dab dry, but do not submerge in water for at least 2 weeks and do not rub incisions.   - After the dressing is off, you may leave it open to air or apply a dressing for protection.     When to contact your medical team:  - Call or seek medical attention for pain that continues to worsen, new onset of numbness or tingling, or excessive swelling.  - See a medical provider for new onset of chest pain or shortness of breath; this may be a sign of a heart attack or blood clot in you lungs.  - Contact us if there is any increased drainage, swelling, pain, or redness stemming from your incision; this may be a sign of infection and would need to be looked at immediately.  - To speak with someone from Orthopaedic Surgery call [phone], ask for the orthopaedic resident on call, and provide a call back number.    Follow-up:   - For questions regarding your follow-up appointment at [facility] call [phone].      Discharge patient     Diet   Order Comments: Follow this diet upon discharge: As tolerated     Order Specific Question Answer Comments   Is discharge order? Yes          FOLLOWUP:    Future Appointments   Date Time Provider Department Center   2/18/2020  1:40 PM Jan Ward MD [facility]     Appointments at [facility]: [address] [place] [id number]  Please call [phone] if you haven't heard regarding these appointments within 7 days of discharge.    ATTESTATION:  I have reviewed today's vital signs, notes, medications, labs and imaging.    Tree Brown MD, PhD  Orthopaedic Surgery Resident, PGY4  [facility]

## 2020-01-30 DIAGNOSIS — M54.16 LUMBAR RADICULOPATHY: Primary | ICD-10-CM

## 2020-02-18 ENCOUNTER — ANCILLARY PROCEDURE (OUTPATIENT)
Dept: GENERAL RADIOLOGY | Facility: CLINIC | Age: 70
End: 2020-02-18
Attending: ORTHOPAEDIC SURGERY
Payer: COMMERCIAL

## 2020-02-18 ENCOUNTER — OFFICE VISIT (OUTPATIENT)
Dept: ORTHOPEDICS | Facility: CLINIC | Age: 70
End: 2020-02-18
Payer: COMMERCIAL

## 2020-02-18 DIAGNOSIS — M54.16 LUMBAR RADICULOPATHY: ICD-10-CM

## 2020-02-18 DIAGNOSIS — M25.552 LEFT HIP PAIN: Primary | ICD-10-CM

## 2020-02-18 NOTE — PROGRESS NOTES
Spine Surgery Return Clinic Visit      Chief Complaint:   Surgical Followup (DOS 20 S/P Lumbar 3 to 4 decompression and discectomy and left Lumbar 4-5 Hemilaminectomy)      Interval HPI:  Symptom Profile Including: location of symptoms, onset, severity, exacerbating/alleviating factors, previous treatments:        Tyson Fregoso is a 70 year old male who returns doing very well with regards to his back.    He is 6 weeks status post lumbar decompression.  He says his preoperative radicular complaints are totally resolved.  He has minimal back pain.  His main concern today is left hip arthritis.  He is previously seen physical therapy and had injections for this.            Past Medical History:   No past medical history on file.         Past Surgical History:     Past Surgical History:   Procedure Laterality Date     LAMINECTOMY LUMBAR TWO LEVELS N/A 2020    Procedure: Lumbar 3 to 4 decompression and discectomy and left Lumbar 4-5 Hemilaminectomy;  Surgeon: Jan Ward MD;  Location: [facility] OR     microdiscectomy      L4-5     SHOULDER SURGERY Bilateral     arthroscopies (Left , right )            Social History:     Social History     Tobacco Use     Smoking status: Former Smoker     Packs/day: 0.50     Years: 17.00     Pack years: 8.50     Last attempt to quit:      Years since quittin.1     Smokeless tobacco: Never Used   Substance Use Topics     Alcohol use: Yes     Alcohol/week: 3.0 standard drinks     Types: 3 Cans of beer per week     Drinks per session: 3 or 4     Binge frequency: Daily or almost daily            Family History:     Family History   Problem Relation Age of Onset     Deep Vein Thrombosis Brother      Deep Vein Thrombosis (DVT) Son      Anesthesia Reaction No family hx of      Cardiovascular No family hx of             Allergies:   No Known Allergies         Medications:     Current Outpatient Medications   Medication     glucosamine-chondroitin  500-400 MG CAPS per capsule     multivitamin w/minerals (MULTI-VITAMIN) tablet     acetaminophen (TYLENOL) 325 MG tablet     gabapentin (NEURONTIN) 300 MG capsule     HYDROcodone-acetaminophen (NORCO) 5-325 MG tablet     mupirocin (BACTROBAN) 2 % external ointment     naproxen sodium 220 MG capsule     senna-docusate (SENOKOT-S/PERICOLACE) 8.6-50 MG tablet     No current facility-administered medications for this visit.              Review of Systems:   A focused musculoskeletal and neurologic ROS was performed with pertinent positives and negatives noted in the HPI.  Additional systems were also reviewed and are documented at the bottom of the note.         Physical Exam:   Vitals: There were no vitals taken for this visit.  Musculoskeletal, Neurologic, and Spine:          Lumbar Spine:    Appearance - No gross stepoffs or deformities    Motor -     L2-3: Hip flexion 5/5 R and 5/5 L strength          L3/4:  Knee extension R 5/5 and L 5/5 strength         L4/5:  Foot dorsiflexion R 5/5 L 5/5 and       EHL dorsiflexion R 5/5 L 5/5 strength         S1:  Plantarflexion/Peroneal Muscles  R 5/5 and L 5/5 strength    Sensation: intact to light touch L3-S1 distribution BLE                 Imaging:   We ordered and independently reviewed new radiographs at this clinic visit. The results were discussed with the patient. Findings include:    AP and lateral lumbar films show a lumbar arthritis but no instability.  Postoperative changes.       Assessment and Plan:     70 year old male with excellent clinical outcome status post lumbar decompression.  He can advance activities as tolerated with regards to the spine.  I congratulated him on his excellent outcome.    With regards to his hip arthritis it does seem that he has pursued extensive nonoperative management in the form of injections and therapy and he is interested in surgery at this time.  In going to get him set up to see 1 of our joint replacement  surgeons.    Respectfully,  Jan Ward MD  Spine Surgery  [facility]    Answers for HPI/ROS submitted by the patient on 2/18/2020   General Symptoms: No  Skin Symptoms: No  HENT Symptoms: No  EYE SYMPTOMS: No  HEART SYMPTOMS: No  LUNG SYMPTOMS: No  INTESTINAL SYMPTOMS: No  URINARY SYMPTOMS: No  REPRODUCTIVE SYMPTOMS: No  SKELETAL SYMPTOMS: No  BLOOD SYMPTOMS: No  NERVOUS SYSTEM SYMPTOMS: No  MENTAL HEALTH SYMPTOMS: No

## 2020-02-18 NOTE — NURSING NOTE
Reason For Visit:   Chief Complaint   Patient presents with     Surgical Followup     DOS 1/8/20 S/P Lumbar 3 to 4 decompression and discectomy and left Lumbar 4-5 Hemilaminectomy       Primary MD: Akbar Pa  Ref. MD: Ciara     ?  No  Occupation retired.  Currently working? No.  Work status?  Retired.  Date of injury:   Type of injury: chrionic .  Date of surgery: 2001  Type of surgery: discectomy?  Date of surgery 1/8/20  Type of surgery Lumbar 3 to 4 decompression and discectomy and left Lumbar 4-5 Hemilaminectomy  Smoker: No  Pain Assessment  Patient Currently in Pain: Denies    Oswestry (JIGNA) Questionnaire    OSWESTRY DISABILITY INDEX 2/18/2020   Count 9   Sum 5   Oswestry Score (%) 11.11   Some recent data might be hidden          Visual Analog Pain Scale  Back Pain Scale 0-10: 0  Right leg pain: 0  Left leg pain: 0    Promis 10 Assessment    PROMIS 10 2/18/2020   In general, would you say your health is: Good   In general, would you say your quality of life is: Very good   In general, how would you rate your physical health? Very good   In general, how would you rate your mental health, including your mood and your ability to think? Very good   In general, how would you rate your satisfaction with your social activities and relationships? Very good   In general, please rate how well you carry out your usual social activities and roles Very good   To what extent are you able to carry out your everyday physical activities such as walking, climbing stairs, carrying groceries, or moving a chair? Completely   How often have you been bothered by emotional problems such as feeling anxious, depressed or irritable? Never   How would you rate your fatigue on average? None   How would you rate your pain on average?   0 = No Pain  to  10 = Worst Imaginable Pain 3   In general, would you say your health is: 3   In general, would you say your quality of life is: 4   In general, how would you rate  your physical health? 4   In general, how would you rate your mental health, including your mood and your ability to think? 4   In general, how would you rate your satisfaction with your social activities and relationships? 4   In general, please rate how well you carry out your usual social activities and roles. (This includes activities at home, at work and in your community, and responsibilities as a parent, child, spouse, employee, friend, etc.) 4   To what extent are you able to carry out your everyday physical activities such as walking, climbing stairs, carrying groceries, or moving a chair? 5   In the past 7 days, how often have you been bothered by emotional problems such as feeling anxious, depressed, or irritable? 1   In the past 7 days, how would you rate your fatigue on average? 1   In the past 7 days, how would you rate your pain on average, where 0 means no pain, and 10 means worst imaginable pain? 3   Global Mental Health Score 17   Global Physical Health Score 18   PROMIS TOTAL - SUBSCORES 35   Some recent data might be hidden                Haley Lara LPN

## 2020-02-18 NOTE — LETTER
2020       RE: Tyson Fregoso  [address] [address]     Dear Colleague,    Thank you for referring your patient, Tyson Fregoso, to the [facility] ORTH[facility] at [facility]. Please see a copy of my visit note below.    Spine Surgery Return Clinic Visit      Chief Complaint:   Surgical Followup (DOS 20 S/P Lumbar 3 to 4 decompression and discectomy and left Lumbar 4-5 Hemilaminectomy)    Interval HPI:  Symptom Profile Including: location of symptoms, onset, severity, exacerbating/alleviating factors, previous treatments:        Tyson Fregoso is a 70 year old male who returns doing very well with regards to his back.    He is 6 weeks status post lumbar decompression.  He says his preoperative radicular complaints are totally resolved.  He has minimal back pain.  His main concern today is left hip arthritis.  He is previously seen physical therapy and had injections for this.            Past Medical History:   No past medical history on file.         Past Surgical History:     Past Surgical History:   Procedure Laterality Date     LAMINECTOMY LUMBAR TWO LEVELS N/A 2020    Procedure: Lumbar 3 to 4 decompression and discectomy and left Lumbar 4-5 Hemilaminectomy;  Surgeon: Jan Ward MD;  [facility]: [facility]     microdiscectomy      L4-5     SHOULDER SURGERY Bilateral     arthroscopies (Left , right )            Social History:     Social History     Tobacco Use     Smoking status: Former Smoker     Packs/day: 0.50     Years: 17.00     Pack years: 8.50     Last attempt to quit:      Years since quittin.1     Smokeless tobacco: Never Used   Substance Use Topics     Alcohol use: Yes     Alcohol/week: 3.0 standard drinks     Types: 3 Cans of beer per week     Drinks per session: 3 or 4     Binge frequency: Daily or almost daily            Family History:     Family History   Problem Relation  Age of Onset     Deep Vein Thrombosis Brother      Deep Vein Thrombosis (DVT) Son      Anesthesia Reaction No family hx of      Cardiovascular No family hx of             Allergies:   No Known Allergies         Medications:     Current Outpatient Medications   Medication     glucosamine-chondroitin 500-400 MG CAPS per capsule     multivitamin w/minerals (MULTI-VITAMIN) tablet     acetaminophen (TYLENOL) 325 MG tablet     gabapentin (NEURONTIN) 300 MG capsule     HYDROcodone-acetaminophen (NORCO) 5-325 MG tablet     mupirocin (BACTROBAN) 2 % external ointment     naproxen sodium 220 MG capsule     senna-docusate (SENOKOT-S/PERICOLACE) 8.6-50 MG tablet     No current facility-administered medications for this visit.              Review of Systems:   A focused musculoskeletal and neurologic ROS was performed with pertinent positives and negatives noted in the HPI.  Additional systems were also reviewed and are documented at the bottom of the note.         Physical Exam:   Vitals: There were no vitals taken for this visit.  Musculoskeletal, Neurologic, and Spine:          Lumbar Spine:    Appearance - No gross stepoffs or deformities    Motor -     L2-3: Hip flexion 5/5 R and 5/5 L strength          L3/4:  Knee extension R 5/5 and L 5/5 strength         L4/5:  Foot dorsiflexion R 5/5 L 5/5 and       EHL dorsiflexion R 5/5 L 5/5 strength         S1:  Plantarflexion/Peroneal Muscles  R 5/5 and L 5/5 strength    Sensation: intact to light touch L3-S1 distribution BLE           Imaging:   We ordered and independently reviewed new radiographs at this clinic visit. The results were discussed with the patient. Findings include:    AP and lateral lumbar films show a lumbar arthritis but no instability.  Postoperative changes.     Assessment and Plan:     70 year old male with excellent clinical outcome status post lumbar decompression.  He can advance activities as tolerated with regards to the spine.  I congratulated him on  his excellent outcome.    With regards to his hip arthritis it does seem that he has pursued extensive nonoperative management in the form of injections and therapy and he is interested in surgery at this time.  In going to get him set up to see 1 of our joint replacement surgeons.    Respectfully,  Jan Ward MD  Spine Surgery  [facility]

## 2020-02-20 NOTE — TELEPHONE ENCOUNTER
DIAGNOSIS: for left hip osteoarthritis. Dr. Ward referring all records in   Epic   APPOINTMENT DATE: 4/15   NOTES STATUS DETAILS   OFFICE NOTE from referring provider Internal    OFFICE NOTE from other specialist Care Everywhere/internal [facility]   DISCHARGE SUMMARY from hospital N/A    DISCHARGE REPORT from the ER N/A    OPERATIVE REPORT Internal 1/8/20  Lumbar 3 to 4 decompression and discectomy and left Lumbar 4-5 Hemilaminectomy   MEDICATION LIST Internal    MRI Received 11/13/19   CT SCAN N/A    XRAYS (IMAGES & REPORTS) Internal/recieved 2/18/20-internal  1/8/20-internal  12/11/18-[place] [facility]

## 2020-04-15 ENCOUNTER — PRE VISIT (OUTPATIENT)
Dept: ORTHOPEDICS | Facility: CLINIC | Age: 70
End: 2020-04-15

## 2020-04-27 ENCOUNTER — TELEPHONE (OUTPATIENT)
Dept: ORTHOPEDICS | Facility: CLINIC | Age: 70
End: 2020-04-27

## 2020-04-27 DIAGNOSIS — M25.552 LEFT HIP PAIN: Primary | ICD-10-CM

## 2020-04-27 NOTE — TELEPHONE ENCOUNTER
[facility] Call Center    Phone Message    May a detailed message be left on voicemail: yes     Reason for Call: Other: Pt called and requested a sooner appt if possible due to the hip pain getting worse. Pt stated the pain has gotten worse even after taking tyleol. Please call back pt to discuss. Thanks.     Action Taken: Message routed to:  [facility] ([facility] ORTHO    Travel Screening: Not Applicable

## 2020-04-27 NOTE — TELEPHONE ENCOUNTER
[facility] Call Center    Phone Message    May a detailed message be left on voicemail: yes     Reason for Call: Other: pt calling because he needs his imaging order sent to [facility] [address] [address] (f) [phone]. Please call the pt once the actions is completed. Thank You      Action Taken: Message routed to:  [facility] ([facility]): orthopedics    Travel Screening: Not Applicable

## 2020-04-27 NOTE — TELEPHONE ENCOUNTER
Called pt back. He can move up to May 6th or May 13th.     Hip imaging needs to be done first.       Haley

## 2020-05-06 ENCOUNTER — VIRTUAL VISIT (OUTPATIENT)
Dept: ORTHOPEDICS | Facility: CLINIC | Age: 70
End: 2020-05-06
Attending: ORTHOPAEDIC SURGERY
Payer: COMMERCIAL

## 2020-05-06 DIAGNOSIS — M25.552 LEFT HIP PAIN: Primary | ICD-10-CM

## 2020-05-06 NOTE — PROGRESS NOTES
"Tyson Fregoso is a 70 year old male who is being evaluated via a billable video visit.      The patient has been notified of following:     \"This video visit will be conducted via a call between you and your physician/provider. We have found that certain health care needs can be provided without the need for an in-person physical exam.  This service lets us provide the care you need with a video conversation.  If a prescription is necessary we can send it directly to your pharmacy.  If lab work is needed we can place an order for that and you can then stop by our lab to have the test done at a later time.    Video visits are billed at different rates depending on your insurance coverage.  Please reach out to your insurance provider with any questions.    If during the course of the call the physician/provider feels a video visit is not appropriate, you will not be charged for this service.\"    Patient has given verbal consent for Video visit? Yes    How would you like to obtain your AVS? Mail a copy    Patient would like the video invitation sent by: Send to e-mail at: [email or web address]      Will anyone else be joining your video visit? No        Video-Visit Details  [facility], Orthopaedic Surgery, Arthritis, Hip and Knee Replacement    Tyson Fregoso MRN# [id number]   Age: 70 year old YOB: 1950     Requesting physician: Jan Belcher*              History of Present Illness:   Rubén is a very pleasant 70-year-old man with a history of left hip pain.  He notes that the pain has been present for the past 2 years or so.  The pain has been getting progressively worse throughout this time.  He recently underwent a lumbar spine procedure.  He recovered excellently from that procedure and is doing very well.  His preoperative spine symptoms have totally resolved.  Since that time he has had a severe increase in his left hip pain.  He localizes the pain to the groin, buttock, " and notes that it radiates down his thigh.  He walks with a severe limp.  He is not using any assistive devices.  He has had a hip injection that provided pain relief for an hour or so otherwise has not provided any good pain relief.  He has been doing exercise and is using a bike which is generally well-tolerated.  The pain is getting to the point where his activities of daily living are severely disrupted.  He can no longer do simple activities without severe pain in his hand.  He is interested in considering more definitive treatment management options.             Past Medical History:     Patient Active Problem List   Diagnosis     Lumbar radiculopathy     Spinal stenosis of lumbar region with neurogenic claudication     Post-operative state     No past medical history on file.  Prior history of blood clot: none  Prior history of bleeding problems: none  Prior history of anesthetic complications: none  Currently on opioids:  none  History of Diabetes (if so, recent A1c): no           Past Surgical History:     Past Surgical History:   Procedure Laterality Date     LAMINECTOMY LUMBAR TWO LEVELS N/A 1/8/2020    Procedure: Lumbar 3 to 4 decompression and discectomy and left Lumbar 4-5 Hemilaminectomy;  Surgeon: Jan Ward MD;  Location: [facility] OR     microdiscectomy  2001    L4-5     SHOULDER SURGERY Bilateral     arthroscopies (Left 2005, right 2013)            Social History:     Social History     Socioeconomic History     Marital status:      Spouse name: Not on file     Number of children: Not on file     Years of education: Not on file     Highest education level: Not on file   Occupational History     Not on file   Social Needs     Financial resource strain: Not on file     Food insecurity     Worry: Not on file     Inability: Not on file     Transportation needs     Medical: Not on file     Non-medical: Not on file   Tobacco Use     Smoking status: Former Smoker     Packs/day: 0.50      Years: 17.00     Pack years: 8.50     Last attempt to quit:      Years since quittin.3     Smokeless tobacco: Never Used   Substance and Sexual Activity     Alcohol use: Yes     Alcohol/week: 3.0 standard drinks     Types: 3 Cans of beer per week     Drinks per session: 3 or 4     Binge frequency: Daily or almost daily     Drug use: Not on file     Sexual activity: Not on file   Lifestyle     Physical activity     Days per week: Not on file     Minutes per session: Not on file     Stress: Not on file   Relationships     Social connections     Talks on phone: Not on file     Gets together: Not on file     Attends Shinto service: Not on file     Active member of club or organization: Not on file     Attends meetings of clubs or organizations: Not on file     Relationship status: Not on file     Intimate partner violence     Fear of current or ex partner: Not on file     Emotionally abused: Not on file     Physically abused: Not on file     Forced sexual activity: Not on file   Other Topics Concern     Not on file   Social History Narrative     Not on file       Smoking: non smoker           Family History:       Family History   Problem Relation Age of Onset     Deep Vein Thrombosis Brother      Deep Vein Thrombosis (DVT) Son      Anesthesia Reaction No family hx of      Cardiovascular No family hx of               Medications:     Current Outpatient Medications   Medication Sig     acetaminophen (TYLENOL) 325 MG tablet Take 325-650 mg by mouth every 6 hours as needed for mild pain     gabapentin (NEURONTIN) 300 MG capsule Take 300 mg by mouth At Bedtime      glucosamine-chondroitin 500-400 MG CAPS per capsule Take 1 capsule by mouth daily (with lunch)     multivitamin w/minerals (MULTI-VITAMIN) tablet Take 1 tablet by mouth every morning     naproxen sodium 220 MG capsule Take 220 mg by mouth 2 times daily (with meals)      HYDROcodone-acetaminophen (NORCO) 5-325 MG tablet Take 1 tablet by mouth every 4  hours as needed for severe pain (Patient not taking: Reported on 2/18/2020)     mupirocin (BACTROBAN) 2 % external ointment Apply to each nare twice daily for 5 days prior to surgery date. (Patient not taking: Reported on 2/18/2020)     senna-docusate (SENOKOT-S/PERICOLACE) 8.6-50 MG tablet Take 1 tablet by mouth 2 times daily as needed for constipation (Patient not taking: Reported on 2/18/2020)     No current facility-administered medications for this visit.                     Physical Exam:     He is a healthy appearing individual on today's video exam.           Data:   Imaging:   X-rays demonstrate end-stage bone-on-bone left hip arthritis.           Assessment and Plan:   Assessment:  Rubén is a very pleasant 70-year-old man who is otherwise healthy with end-stage left hip arthritis.  We a long discussion regarding the natural history of his condition as well as surgical nonsurgical treatments.  We reviewed total hip replacement as an option.  His quality of life is severely impacted.  We discussed the surgery, long-term outcomes, risks and benefits.  Ultimately believe the benefits of surgery outweigh these risks.  We will tentatively plan to proceed with the left total hip replacement.  I will see him back in clinic prior to definitively proceeding with surgery to ensure that based on his physical exam and further discussion that this is the optimal course.  He is in agreement this treatment plan.  Once the issues with COVID have settled down we will reach out to schedule surgery as well as a follow-up visit.  He will contact us if he has any questions or concerns in the meantime.  He will also continue to optimize nonsurgical treatments in the meantime.          Total video visit length 16 minutes, facetime, total time 18 minutes.    Logan Waters M.D.     Arthritis and Joint Replacement  Department of Orthopaedic Surgery, [facility]  [email or web address]  [phone]  (pager)           Review of Systems:

## 2020-05-12 ENCOUNTER — TELEPHONE (OUTPATIENT)
Dept: ORTHOPEDICS | Facility: CLINIC | Age: 70
End: 2020-05-12

## 2020-05-12 NOTE — TELEPHONE ENCOUNTER
"Received voicemail message from patient requesting a call back to discuss rescheduling his surgery with Dr Waters. Patient states his pain \"is getting worse.\" Patient is hoping for surgery ASAP. Patient confirmed both home and mobile numbers are correct, can be reach at either number.  "

## 2020-05-13 NOTE — TELEPHONE ENCOUNTER
Reached out to patient. Reiterated the information he had received from Deja regarding how surgery scheduling will proceed. Discussed patient's current pain regimen. He is icing and taking Tylenol twice a day and Aleve twice a day. Advised patient he could add another dose of Tylenol in and that he needs to keep his total dosage no more than 3000 mg in a 24-hour period. Patient expressed understanding and appreciation.

## 2020-05-13 NOTE — TELEPHONE ENCOUNTER
Received voicemail message from patient, stating he has called 3 days in a row with no response. He is looking for an update on the rescheduling process for surgery. Patient can be reached at either his home or cell number.

## 2020-05-13 NOTE — TELEPHONE ENCOUNTER
Returned phone call to patient informing him that we slowly and in a limited capacity rescheduling surgeries and that I currently do not have a surgery date for him. Patient states he would like a call back to discuss pain. Patient was informed another message will be sent to clinic to follow up regarding pain.

## 2020-06-05 ENCOUNTER — TELEPHONE (OUTPATIENT)
Dept: ORTHOPEDICS | Facility: CLINIC | Age: 70
End: 2020-06-05

## 2020-06-05 NOTE — TELEPHONE ENCOUNTER
"Received voicemail message from patient regarding rescheduling surgery. Patient reports that he is in a lot of pain and has been \"waiting for over a month to schedule.\" Patient requests a call back to discuss rescheduling with Dr Waters.   "

## 2020-06-05 NOTE — TELEPHONE ENCOUNTER
"Returned phone call to patient and informed him that I do have him on the reschedule list but currently I do not have a surgery date for him due to surgeries being scheduled on a limited bases. Patient became very upset and asked if he should go to another health care organization. I  Informed patient that I could not speak to what other organizations are looking like for surgery time frames. Patient stated understanding and states \" Thanks for letting me know I will never get my surgery scheduled.\" I informed patient that we will be able we are just monitoring beds. Patient states according to new papers everything is opening up so he doesnt understand how we are not fully scheduling yet. I again explained we are trying to not schedule too far out so if we do see another surge in coivd we would not have to cancel surgeries. Patient states he does not care and would rather have a surgery date that gets cancelled than not have any information. I again explained to patient that I do not even have a surgery schedule for Dr. Waters to offer a surgery date again due to limited OR availability and due to the OR only scheduling out 6 weeks at a time. Patient disconnected phone call.    "

## 2020-06-10 ENCOUNTER — TELEPHONE (OUTPATIENT)
Dept: ORTHOPEDICS | Facility: CLINIC | Age: 70
End: 2020-06-10

## 2020-06-10 NOTE — TELEPHONE ENCOUNTER
Patient is scheduled for surgery with Dr. Waters      Spoke or left message with: Genny with Rubén    Date of Surgery: 6/18/20    Location: [place]    Informed patient they will need an adult  n/a    H&P: Patient to schedule with Akbar Pa    Additional imaging/appointments: n/a    Surgery packet: Given in clinic     Additional comments: Patient will await pre op phone call 1-2 days prior to surgery for arrival time

## 2020-06-11 ENCOUNTER — PREP FOR PROCEDURE (OUTPATIENT)
Dept: ORTHOPEDICS | Facility: CLINIC | Age: 70
End: 2020-06-11

## 2020-06-11 DIAGNOSIS — M16.12 PRIMARY LOCALIZED OSTEOARTHRITIS OF LEFT HIP: Primary | ICD-10-CM

## 2020-06-12 ENCOUNTER — TELEPHONE (OUTPATIENT)
Dept: ORTHOPEDICS | Facility: CLINIC | Age: 70
End: 2020-06-12

## 2020-06-12 DIAGNOSIS — Z11.59 ENCOUNTER FOR SCREENING FOR OTHER VIRAL DISEASES: Primary | ICD-10-CM

## 2020-06-12 PROBLEM — M16.12 PRIMARY LOCALIZED OSTEOARTHRITIS OF LEFT HIP: Status: ACTIVE | Noted: 2020-06-12

## 2020-06-12 NOTE — TELEPHONE ENCOUNTER
Reached out to patient to inform him that a preop packet had been mailed. Patient will be coming in Wednesday to see Dr. Waters before surgery. Will go over packet with patient again at that time. At this time, we discussed medications to hold; patient has his preop scheduled as well as COVID testing. Patient will get his surgical soap at this appointment on Thursday. Patient will review packet when he received it and come prepared with further questions. Understanding was expressed.

## 2020-06-16 DIAGNOSIS — Z11.59 ENCOUNTER FOR SCREENING FOR OTHER VIRAL DISEASES: ICD-10-CM

## 2020-06-16 LAB
SARS-COV-2 RNA SPEC QL NAA+PROBE: NOT DETECTED
SPECIMEN SOURCE: NORMAL

## 2020-06-16 PROCEDURE — 99000 SPECIMEN HANDLING OFFICE-LAB: CPT | Performed by: ORTHOPAEDIC SURGERY

## 2020-06-16 PROCEDURE — U0003 INFECTIOUS AGENT DETECTION BY NUCLEIC ACID (DNA OR RNA); SEVERE ACUTE RESPIRATORY SYNDROME CORONAVIRUS 2 (SARS-COV-2) (CORONAVIRUS DISEASE [COVID-19]), AMPLIFIED PROBE TECHNIQUE, MAKING USE OF HIGH THROUGHPUT TECHNOLOGIES AS DESCRIBED BY CMS-2020-01-R: HCPCS | Performed by: ORTHOPAEDIC SURGERY

## 2020-06-17 ENCOUNTER — OFFICE VISIT (OUTPATIENT)
Dept: ORTHOPEDICS | Facility: CLINIC | Age: 70
End: 2020-06-17
Payer: COMMERCIAL

## 2020-06-17 ENCOUNTER — ANCILLARY PROCEDURE (OUTPATIENT)
Dept: GENERAL RADIOLOGY | Facility: CLINIC | Age: 70
End: 2020-06-17
Attending: ORTHOPAEDIC SURGERY
Payer: COMMERCIAL

## 2020-06-17 ENCOUNTER — ANESTHESIA EVENT (OUTPATIENT)
Dept: SURGERY | Facility: CLINIC | Age: 70
End: 2020-06-17
Payer: COMMERCIAL

## 2020-06-17 DIAGNOSIS — M25.552 LEFT HIP PAIN: ICD-10-CM

## 2020-06-17 DIAGNOSIS — M25.552 LEFT HIP PAIN: Primary | ICD-10-CM

## 2020-06-17 RX ORDER — ACETAMINOPHEN 325 MG/1
975 TABLET ORAL ONCE
Status: CANCELLED | OUTPATIENT
Start: 2020-06-17 | End: 2020-06-17

## 2020-06-17 RX ORDER — GABAPENTIN 100 MG/1
100 CAPSULE ORAL ONCE
Status: CANCELLED | OUTPATIENT
Start: 2020-06-17 | End: 2020-06-17

## 2020-06-17 NOTE — PROGRESS NOTES
[facility] Physicians, Orthopaedic Surgery, Arthritis, Hip and Knee Replacement    Tyson Fregoso MRN# [id number]   Age: 70 year old YOB: 1950     Requesting physician: Jan Belcher*              History of Present Illness:   Rubén returns for a follow-up visit in preparation for the upcoming left total hip replacement.  I last did a virtual video visit with him a few weeks back.  Since that time he notes that his symptoms are more or less unchanged.  His left hip pain is severe.  It limits his normal activities of daily living.  He persistently walks with a limp.  He has failed conservative treatment options as previously discussed.  Otherwise there are no changes to his symptoms or his health.  He is looking forward to proceeding with the upcoming total hip replacement.           Past Medical History:     Patient Active Problem List   Diagnosis     Lumbar radiculopathy     Spinal stenosis of lumbar region with neurogenic claudication     Post-operative state     Primary localized osteoarthritis of left hip     No past medical history on file.  Prior history of blood clot: none  Prior history of bleeding problems: none  Prior history of anesthetic complications: none  Currently on opioids:  none  History of Diabetes (if so, recent A1c): no           Past Surgical History:     Past Surgical History:   Procedure Laterality Date     LAMINECTOMY LUMBAR TWO LEVELS N/A 1/8/2020    Procedure: Lumbar 3 to 4 decompression and discectomy and left Lumbar 4-5 Hemilaminectomy;  Surgeon: Jan Ward MD;  Location: [facility] OR     microdiscectomy  2001    L4-5     SHOULDER SURGERY Bilateral     arthroscopies (Left 2005, right 2013)            Social History:     Social History     Socioeconomic History     Marital status:      Spouse name: Not on file     Number of children: Not on file     Years of education: Not on file     Highest education level: Not on file   Occupational History      Not on file   Social Needs     Financial resource strain: Not on file     Food insecurity     Worry: Not on file     Inability: Not on file     Transportation needs     Medical: Not on file     Non-medical: Not on file   Tobacco Use     Smoking status: Former Smoker     Packs/day: 0.50     Years: 17.00     Pack years: 8.50     Last attempt to quit:      Years since quittin.4     Smokeless tobacco: Never Used   Substance and Sexual Activity     Alcohol use: Yes     Alcohol/week: 3.0 standard drinks     Types: 3 Cans of beer per week     Drinks per session: 3 or 4     Binge frequency: Daily or almost daily     Drug use: Not on file     Sexual activity: Not on file   Lifestyle     Physical activity     Days per week: Not on file     Minutes per session: Not on file     Stress: Not on file   Relationships     Social connections     Talks on phone: Not on file     Gets together: Not on file     Attends Roman Catholic service: Not on file     Active member of club or organization: Not on file     Attends meetings of clubs or organizations: Not on file     Relationship status: Not on file     Intimate partner violence     Fear of current or ex partner: Not on file     Emotionally abused: Not on file     Physically abused: Not on file     Forced sexual activity: Not on file   Other Topics Concern     Not on file   Social History Narrative     Not on file       Smoking: non smoker           Family History:       Family History   Problem Relation Age of Onset     Deep Vein Thrombosis Brother      Deep Vein Thrombosis (DVT) Son      Anesthesia Reaction No family hx of      Cardiovascular No family hx of               Medications:     Current Outpatient Medications   Medication Sig     acetaminophen (TYLENOL) 325 MG tablet Take 325-650 mg by mouth every 6 hours as needed for mild pain     glucosamine-chondroitin 500-400 MG CAPS per capsule Take 1 capsule by mouth daily (with lunch)     multivitamin w/minerals  (MULTI-VITAMIN) tablet Take 1 tablet by mouth every morning     gabapentin (NEURONTIN) 300 MG capsule Take 300 mg by mouth At Bedtime      HYDROcodone-acetaminophen (NORCO) 5-325 MG tablet Take 1 tablet by mouth every 4 hours as needed for severe pain (Patient not taking: Reported on 2/18/2020)     naproxen sodium 220 MG capsule Take 220 mg by mouth 2 times daily (with meals) Aleve     senna-docusate (SENOKOT-S/PERICOLACE) 8.6-50 MG tablet Take 1 tablet by mouth 2 times daily as needed for constipation (Patient not taking: Reported on 2/18/2020)     No current facility-administered medications for this visit.                     Physical Exam:     He walks with an antalgic gait.  Examination of the left hip demonstrates range of motion from 0 to 80 degrees with 0 degrees of internal rotation, 40 degrees of external rotation.  Flexion and internal rotation reproduces baseline symptoms.  Active straight leg raise also reproduces his baseline symptoms.  He has 5-5 abductor strength and no lateral hip tenderness palpation.  He is otherwise healthy appearing normocephalic atraumatic.  Extraocular movements normal.  His extremities are warm and well-perfused.  Nonlabored breathing.  No rashes or abrasions throughout his left leg.           Data:   Imaging:   X-rays demonstrate end-stage bone-on-bone left hip arthritis.           Assessment and Plan:   Assessment:  Rubén is a healthy and previously active 70-year-old man with end-stage left hip arthritis.  I had a long discussion with the patient regarding ongoing management options.  We reviewed surgical and nonsurgical treatments.  We reviewed total hip replacement in detail including the procedure, the implants, the recovery process, and long-term outcomes.  We reviewed that the risks of the surgery include but are not limited to infection, wound problems, fracture, dislocation, limb length discrepancy, loosening, revision surgery.  We also reviewed less common risks  such as neurovascular injury and other implant-related issues.  We reviewed other medical complications such as a blood clot.  We discussed that the vast majority of cases have a highly successful outcome.  However there is a small subset of patients that do experience complications or problems following the knee replacement and these problems can be very debilitating and painful and sometimes do not improve.   Based on a discussion of the risks and benefits, we believe that the benefits far outweigh the risks at this point and the patient   would like to proceed with surgery.        Logan Waters M.D.     Arthritis and Joint Replacement  Department of Orthopaedic Surgery, [facility]  [email or web address]  [phone] (pager)           Review of Systems:

## 2020-06-17 NOTE — ANESTHESIA PREPROCEDURE EVALUATION
Anesthesia Pre-Procedure Evaluation    Patient: Tyson Fregoso   MRN:     [id number] Gender:   male   Age:    70 year old :      1950        Preoperative Diagnosis: Primary localized osteoarthritis of left hip [M16.12]   Procedure(s):  ARTHROPLASTY, HIP, TOTAL LEFT     LABS:  CBC:   Lab Results   Component Value Date    WBC 5.6 12/10/2019    HGB 14.5 12/10/2019    HGB 14.6 10/04/2007    HCT 43.2 12/10/2019     12/10/2019     BMP:   Lab Results   Component Value Date     12/10/2019    POTASSIUM 4.4 12/10/2019    POTASSIUM 4.1 10/04/2007    CHLORIDE 106 12/10/2019    CO2 29 12/10/2019    BUN 25 12/10/2019    CR 0.93 12/10/2019    GLC 95 12/10/2019     COAGS: No results found for: PTT, INR, FIBR  POC:   Lab Results   Component Value Date     (H) 2020     OTHER:   Lab Results   Component Value Date    MARCO 9.2 12/10/2019        Preop Vitals    BP Readings from Last 3 Encounters:   20 117/64   12/10/19 (!) 153/93    Pulse Readings from Last 3 Encounters:   20 59   12/10/19 81      Resp Readings from Last 3 Encounters:   20 16   12/10/19 16    SpO2 Readings from Last 3 Encounters:   20 96%   12/10/19 99%      Temp Readings from Last 1 Encounters:   20 36.4  C (97.5  F) (Oral)    Ht Readings from Last 1 Encounters:   20 1.829 m (6')      Wt Readings from Last 1 Encounters:   20 76.6 kg (168 lb 14 oz)    Estimated body mass index is 22.9 kg/m  as calculated from the following:    Height as of 20: 1.829 m (6').    Weight as of 20: 76.6 kg (168 lb 14 oz).     LDA:  Peripheral IV 20 Upper arm (Active)   Number of days: 161        No past medical history on file.   Past Surgical History:   Procedure Laterality Date     LAMINECTOMY LUMBAR TWO LEVELS N/A 2020    Procedure: Lumbar 3 to 4 decompression and discectomy and left Lumbar 4-5 Hemilaminectomy;  Surgeon: Jan Ward MD;  Location: [facility] OR     microdiscectomy       L4-5     SHOULDER SURGERY Bilateral     arthroscopies (Left , right )      No Known Allergies              PHYSICAL EXAM:   Mental Status/Neuro: A/A/O; Age Appropriate   Airway: Facies: Feasible  Mallampati: I  Mouth/Opening: Full  TM distance: > 6 cm  Neck ROM: Full   Respiratory:   Resp. Rate: Normal     Resp. Effort: Normal      CV:   Rate: Age appropriate  Edema: None   Comments:      Dental: Normal Dentition                Assessment:   ASA SCORE: 2    H&P: History and physical reviewed and following examination; no interval change.    NPO Status: NPO Appropriate     Plan:   Anes. Type:  Spinal; MAC   Pre-Medication: None   Induction:  N/a   Airway: Native Airway   Access/Monitoring: PIV   Maintenance: N/a     Postop Plan:   Postop Pain: Regional  Postop Sedation/Airway: Not planned  Disposition: Inpatient/Admit     PONV Management:    Prevention: Ondansetron, Dexamethasone     CONSENT: Direct conversation   Plan and risks discussed with: Patient                      Robert Guallpa MD     ANESTHESIA PREOP EVALUATION    PROCEDURE: Procedure(s):  ARTHROPLASTY, HIP, TOTAL LEFT    HPI: Tyson Fregoso is a 70 year old male who presents for above procedure.    PAST MEDICAL HISTORY:    No past medical history on file.    PAST SURGICAL HISTORY:    Past Surgical History:   Procedure Laterality Date     LAMINECTOMY LUMBAR TWO LEVELS N/A 2020    Procedure: Lumbar 3 to 4 decompression and discectomy and left Lumbar 4-5 Hemilaminectomy;  Surgeon: Jan Ward MD;  Location: [facility]     microdiscectomy      L4-5     SHOULDER SURGERY Bilateral     arthroscopies (Left , right )       SOCIAL HISTORY:       Social History     Tobacco Use     Smoking status: Former Smoker     Packs/day: 0.50     Years: 17.00     Pack years: 8.50     Last attempt to quit:      Years since quittin.4     Smokeless tobacco: Never Used   Substance Use Topics     Alcohol use: Yes      Alcohol/week: 3.0 standard drinks     Types: 3 Cans of beer per week     Drinks per session: 3 or 4     Binge frequency: Daily or almost daily       ALLERGIES:     No Known Allergies    MEDICATIONS:     No medications prior to admission.       Current Outpatient Medications   Medication Sig Dispense Refill     acetaminophen (TYLENOL) 325 MG tablet Take 325-650 mg by mouth every 6 hours as needed for mild pain       gabapentin (NEURONTIN) 300 MG capsule Take 300 mg by mouth At Bedtime        glucosamine-chondroitin 500-400 MG CAPS per capsule Take 1 capsule by mouth daily (with lunch)       HYDROcodone-acetaminophen (NORCO) 5-325 MG tablet Take 1 tablet by mouth every 4 hours as needed for severe pain (Patient not taking: Reported on 2/18/2020) 42 tablet 0     multivitamin w/minerals (MULTI-VITAMIN) tablet Take 1 tablet by mouth every morning       naproxen sodium 220 MG capsule Take 220 mg by mouth 2 times daily (with meals) Aleve       senna-docusate (SENOKOT-S/PERICOLACE) 8.6-50 MG tablet Take 1 tablet by mouth 2 times daily as needed for constipation (Patient not taking: Reported on 2/18/2020) 28 tablet 0       No current [facility]-ordered facility-administered medications on file.      Current Outpatient Medications Ordered in Epic   Medication Sig Dispense Refill     acetaminophen (TYLENOL) 325 MG tablet Take 325-650 mg by mouth every 6 hours as needed for mild pain       gabapentin (NEURONTIN) 300 MG capsule Take 300 mg by mouth At Bedtime        glucosamine-chondroitin 500-400 MG CAPS per capsule Take 1 capsule by mouth daily (with lunch)       HYDROcodone-acetaminophen (NORCO) 5-325 MG tablet Take 1 tablet by mouth every 4 hours as needed for severe pain (Patient not taking: Reported on 2/18/2020) 42 tablet 0     multivitamin w/minerals (MULTI-VITAMIN) tablet Take 1 tablet by mouth every morning       naproxen sodium 220 MG capsule Take 220 mg by mouth 2 times daily (with meals) Aleve       senna-docusate  (SENOKOT-S/PERICOLACE) 8.6-50 MG tablet Take 1 tablet by mouth 2 times daily as needed for constipation (Patient not taking: Reported on 2/18/2020) 28 tablet 0       PHYSICAL EXAM:    Vitals: T Data Unavailable, P Data Unavailable, BP Data Unavailable, R Data Unavailable, SpO2  , Weight   Wt Readings from Last 2 Encounters:   01/08/20 76.6 kg (168 lb 14 oz)   12/10/19 78 kg (172 lb)       See doc flowsheet    NPO STATUS: see doc flowsheet    LABS:    BMP:  Recent Labs   Lab Test 12/10/19  1540      POTASSIUM 4.4   CHLORIDE 106   CO2 29   BUN 25   CR 0.93   GLC 95   MARCO 9.2       LFTs:   No results for input(s): PROTTOTAL, ALBUMIN, BILITOTAL, ALKPHOS, AST, ALT, BILIDIRECT in the last 44566 hours.    CBC:   Recent Labs   Lab Test 12/10/19  1540   WBC 5.6   RBC 4.39*   HGB 14.5   HCT 43.2   MCV 98   MCH 33.0   MCHC 33.6   RDW 12.8          Coags:  No results for input(s): INR, PTT, FIBR in the last 76549 hours.    Imaging:  No orders to display       Robert Guallpa MD  Anesthesiology Staff  Pager [phone]    6/17/2020  3:36 PM

## 2020-06-17 NOTE — LETTER
6/17/2020     RE: Tyson Fregoso  [address]  [place] [address]    Dear Colleague,    Thank you for referring your patient, Tyson Fregoso, to the [facility]. Please see a copy of my visit note below.      [facility], Orthopaedic Surgery, Arthritis, Hip and Knee Replacement    Tyson Fregoso MRN# [id number]   Age: 70 year old YOB: 1950     Requesting physician: Jan Belcher*              History of Present Illness:   Rubén returns for a follow-up visit in preparation for the upcoming left total hip replacement.  I last did a virtual video visit with him a few weeks back.  Since that time he notes that his symptoms are more or less unchanged.  His left hip pain is severe.  It limits his normal activities of daily living.  He persistently walks with a limp.  He has failed conservative treatment options as previously discussed.  Otherwise there are no changes to his symptoms or his health.  He is looking forward to proceeding with the upcoming total hip replacement.           Past Medical History:     Patient Active Problem List   Diagnosis     Lumbar radiculopathy     Spinal stenosis of lumbar region with neurogenic claudication     Post-operative state     Primary localized osteoarthritis of left hip     No past medical history on file.  Prior history of blood clot: none  Prior history of bleeding problems: none  Prior history of anesthetic complications: none  Currently on opioids:  none  History of Diabetes (if so, recent A1c): no           Past Surgical History:     Past Surgical History:   Procedure Laterality Date     LAMINECTOMY LUMBAR TWO LEVELS N/A 1/8/2020    Procedure: Lumbar 3 to 4 decompression and discectomy and left Lumbar 4-5 Hemilaminectomy;  Surgeon: Jan Ward MD;  Location: [facility] OR     microdiscectomy  2001    L4-5     SHOULDER SURGERY Bilateral     arthroscopies (Left 2005, right 2013)            Social  History:     Social History     Socioeconomic History     Marital status:      Spouse name: Not on file     Number of children: Not on file     Years of education: Not on file     Highest education level: Not on file   Occupational History     Not on file   Social Needs     Financial resource strain: Not on file     Food insecurity     Worry: Not on file     Inability: Not on file     Transportation needs     Medical: Not on file     Non-medical: Not on file   Tobacco Use     Smoking status: Former Smoker     Packs/day: 0.50     Years: 17.00     Pack years: 8.50     Last attempt to quit:      Years since quittin.4     Smokeless tobacco: Never Used   Substance and Sexual Activity     Alcohol use: Yes     Alcohol/week: 3.0 standard drinks     Types: 3 Cans of beer per week     Drinks per session: 3 or 4     Binge frequency: Daily or almost daily     Drug use: Not on file     Sexual activity: Not on file   Lifestyle     Physical activity     Days per week: Not on file     Minutes per session: Not on file     Stress: Not on file   Relationships     Social connections     Talks on phone: Not on file     Gets together: Not on file     Attends [employer] service: Not on file     Active member of club or organization: Not on file     Attends meetings of clubs or organizations: Not on file     Relationship status: Not on file     Intimate partner violence     Fear of current or ex partner: Not on file     Emotionally abused: Not on file     Physically abused: Not on file     Forced sexual activity: Not on file   Other Topics Concern     Not on file   Social History Narrative     Not on file       Smoking: non smoker           Family History:       Family History   Problem Relation Age of Onset     Deep Vein Thrombosis Brother      Deep Vein Thrombosis (DVT) Son      Anesthesia Reaction No family hx of      Cardiovascular No family hx of               Medications:     Current Outpatient Medications   Medication  Sig     acetaminophen (TYLENOL) 325 MG tablet Take 325-650 mg by mouth every 6 hours as needed for mild pain     glucosamine-chondroitin 500-400 MG CAPS per capsule Take 1 capsule by mouth daily (with lunch)     multivitamin w/minerals (MULTI-VITAMIN) tablet Take 1 tablet by mouth every morning     gabapentin (NEURONTIN) 300 MG capsule Take 300 mg by mouth At Bedtime      HYDROcodone-acetaminophen (NORCO) 5-325 MG tablet Take 1 tablet by mouth every 4 hours as needed for severe pain (Patient not taking: Reported on 2/18/2020)     naproxen sodium 220 MG capsule Take 220 mg by mouth 2 times daily (with meals) Aleve     senna-docusate (SENOKOT-S/PERICOLACE) 8.6-50 MG tablet Take 1 tablet by mouth 2 times daily as needed for constipation (Patient not taking: Reported on 2/18/2020)     No current facility-administered medications for this visit.                     Physical Exam:     He walks with an antalgic gait.  Examination of the left hip demonstrates range of motion from 0 to 80 degrees with 0 degrees of internal rotation, 40 degrees of external rotation.  Flexion and internal rotation reproduces baseline symptoms.  Active straight leg raise also reproduces his baseline symptoms.  He has 5-5 abductor strength and no lateral hip tenderness palpation.  He is otherwise healthy appearing normocephalic atraumatic.  Extraocular movements normal.  His extremities are warm and well-perfused.  Nonlabored breathing.  No rashes or abrasions throughout his left leg.           Data:   Imaging:   X-rays demonstrate end-stage bone-on-bone left hip arthritis.           Assessment and Plan:   Assessment:  Rubén is a healthy and previously active 70-year-old man with end-stage left hip arthritis.  I had a long discussion with the patient regarding ongoing management options.  We reviewed surgical and nonsurgical treatments.  We reviewed total hip replacement in detail including the procedure, the implants, the recovery process, and  long-term outcomes.  We reviewed that the risks of the surgery include but are not limited to infection, wound problems, fracture, dislocation, limb length discrepancy, loosening, revision surgery.  We also reviewed less common risks such as neurovascular injury and other implant-related issues.  We reviewed other medical complications such as a blood clot.  We discussed that the vast majority of cases have a highly successful outcome.  However there is a small subset of patients that do experience complications or problems following the knee replacement and these problems can be very debilitating and painful and sometimes do not improve.   Based on a discussion of the risks and benefits, we believe that the benefits far outweigh the risks at this point and the patient   would like to proceed with surgery.        Logan Waters M.D.     Arthritis and Joint Replacement  Department of Orthopaedic Surgery, [facility]  [place]@[facility]  [phone] (pager)

## 2020-06-17 NOTE — NURSING NOTE
Reason For Visit:   Chief Complaint   Patient presents with     Surgical Followup     DOS 6/18/20 Left MAXIME discussion       Primary MD: Akbar Pa  Referring MD: Logan Waters        There were no vitals taken for this visit.    Pain Assessment  Patient Currently in Pain: Yes  0-10 Pain Scale: 3  Primary Pain Location: Hip  Pain Descriptors: Discomfort    Current Outpatient Medications   Medication     acetaminophen (TYLENOL) 325 MG tablet     glucosamine-chondroitin 500-400 MG CAPS per capsule     multivitamin w/minerals (MULTI-VITAMIN) tablet     gabapentin (NEURONTIN) 300 MG capsule     HYDROcodone-acetaminophen (NORCO) 5-325 MG tablet     naproxen sodium 220 MG capsule     senna-docusate (SENOKOT-S/PERICOLACE) 8.6-50 MG tablet     No current facility-administered medications for this visit.         No Known Allergies        Haley Lara LPN

## 2020-06-18 ENCOUNTER — ANESTHESIA (OUTPATIENT)
Dept: SURGERY | Facility: CLINIC | Age: 70
End: 2020-06-18
Payer: COMMERCIAL

## 2020-06-18 ENCOUNTER — APPOINTMENT (OUTPATIENT)
Dept: PHYSICAL THERAPY | Facility: CLINIC | Age: 70
End: 2020-06-18
Attending: ORTHOPAEDIC SURGERY
Payer: COMMERCIAL

## 2020-06-18 ENCOUNTER — HOSPITAL ENCOUNTER (OUTPATIENT)
Facility: CLINIC | Age: 70
Discharge: HOME HEALTH | End: 2020-06-19
Attending: ORTHOPAEDIC SURGERY | Admitting: ORTHOPAEDIC SURGERY
Payer: COMMERCIAL

## 2020-06-18 ENCOUNTER — APPOINTMENT (OUTPATIENT)
Dept: GENERAL RADIOLOGY | Facility: CLINIC | Age: 70
End: 2020-06-18
Attending: PHYSICIAN ASSISTANT
Payer: COMMERCIAL

## 2020-06-18 ENCOUNTER — APPOINTMENT (OUTPATIENT)
Dept: GENERAL RADIOLOGY | Facility: CLINIC | Age: 70
End: 2020-06-18
Attending: ORTHOPAEDIC SURGERY
Payer: COMMERCIAL

## 2020-06-18 DIAGNOSIS — M16.12 PRIMARY LOCALIZED OSTEOARTHRITIS OF LEFT HIP: ICD-10-CM

## 2020-06-18 DIAGNOSIS — Z98.890 STATUS POST KNEE SURGERY: Primary | ICD-10-CM

## 2020-06-18 LAB
ABO + RH BLD: NORMAL
ABO + RH BLD: NORMAL
ALBUMIN UR-MCNC: NEGATIVE MG/DL
APPEARANCE UR: CLEAR
BILIRUB UR QL STRIP: NEGATIVE
BLD GP AB SCN SERPL QL: NORMAL
BLOOD BANK CMNT PATIENT-IMP: NORMAL
BLOOD BANK CMNT PATIENT-IMP: NORMAL
COLOR UR AUTO: NORMAL
GLUCOSE SERPL-MCNC: 102 MG/DL (ref 70–99)
GLUCOSE UR STRIP-MCNC: NEGATIVE MG/DL
HGB UR QL STRIP: NEGATIVE
KETONES UR STRIP-MCNC: NEGATIVE MG/DL
LEUKOCYTE ESTERASE UR QL STRIP: NEGATIVE
NITRATE UR QL: NEGATIVE
PH UR STRIP: 5.5 PH (ref 5–7)
RBC #/AREA URNS AUTO: 0 /HPF (ref 0–2)
SOURCE: NORMAL
SP GR UR STRIP: 1.01 (ref 1–1.03)
SPECIMEN EXP DATE BLD: NORMAL
UROBILINOGEN UR STRIP-MCNC: NORMAL MG/DL (ref 0–2)
WBC #/AREA URNS AUTO: <1 /HPF (ref 0–5)

## 2020-06-18 PROCEDURE — 25000132 ZZH RX MED GY IP 250 OP 250 PS 637: Performed by: STUDENT IN AN ORGANIZED HEALTH CARE EDUCATION/TRAINING PROGRAM

## 2020-06-18 PROCEDURE — 25000125 ZZHC RX 250: Performed by: NURSE ANESTHETIST, CERTIFIED REGISTERED

## 2020-06-18 PROCEDURE — 97110 THERAPEUTIC EXERCISES: CPT | Mod: GP

## 2020-06-18 PROCEDURE — 71000014 ZZH RECOVERY PHASE 1 LEVEL 2 FIRST HR: Performed by: ORTHOPAEDIC SURGERY

## 2020-06-18 PROCEDURE — 97530 THERAPEUTIC ACTIVITIES: CPT | Mod: GP

## 2020-06-18 PROCEDURE — 25800030 ZZH RX IP 258 OP 636: Performed by: ORTHOPAEDIC SURGERY

## 2020-06-18 PROCEDURE — 40000170 ZZH STATISTIC PRE-PROCEDURE ASSESSMENT II: Performed by: ORTHOPAEDIC SURGERY

## 2020-06-18 PROCEDURE — 25000128 H RX IP 250 OP 636: Performed by: NURSE ANESTHETIST, CERTIFIED REGISTERED

## 2020-06-18 PROCEDURE — 37000008 ZZH ANESTHESIA TECHNICAL FEE, 1ST 30 MIN: Performed by: ORTHOPAEDIC SURGERY

## 2020-06-18 PROCEDURE — C1713 ANCHOR/SCREW BN/BN,TIS/BN: HCPCS | Performed by: ORTHOPAEDIC SURGERY

## 2020-06-18 PROCEDURE — 97116 GAIT TRAINING THERAPY: CPT | Mod: GP

## 2020-06-18 PROCEDURE — 25800030 ZZH RX IP 258 OP 636: Performed by: PHYSICIAN ASSISTANT

## 2020-06-18 PROCEDURE — 86850 RBC ANTIBODY SCREEN: CPT | Performed by: ORTHOPAEDIC SURGERY

## 2020-06-18 PROCEDURE — 99207 ZZC CONSULT E&M CHANGED TO SUBSEQUENT LEVEL: CPT | Performed by: INTERNAL MEDICINE

## 2020-06-18 PROCEDURE — 36000062 ZZH SURGERY LEVEL 4 1ST 30 MIN - UMMC: Performed by: ORTHOPAEDIC SURGERY

## 2020-06-18 PROCEDURE — 81001 URINALYSIS AUTO W/SCOPE: CPT | Performed by: ORTHOPAEDIC SURGERY

## 2020-06-18 PROCEDURE — 25800030 ZZH RX IP 258 OP 636: Performed by: NURSE ANESTHETIST, CERTIFIED REGISTERED

## 2020-06-18 PROCEDURE — 36000064 ZZH SURGERY LEVEL 4 EA 15 ADDTL MIN - UMMC: Performed by: ORTHOPAEDIC SURGERY

## 2020-06-18 PROCEDURE — 40000986 XR PELVIS PORT 1/2 VW

## 2020-06-18 PROCEDURE — 86900 BLOOD TYPING SEROLOGIC ABO: CPT | Performed by: ORTHOPAEDIC SURGERY

## 2020-06-18 PROCEDURE — 25000132 ZZH RX MED GY IP 250 OP 250 PS 637: Performed by: PHYSICIAN ASSISTANT

## 2020-06-18 PROCEDURE — 25000125 ZZHC RX 250: Performed by: ORTHOPAEDIC SURGERY

## 2020-06-18 PROCEDURE — 25000128 H RX IP 250 OP 636: Performed by: ORTHOPAEDIC SURGERY

## 2020-06-18 PROCEDURE — 25000128 H RX IP 250 OP 636: Performed by: PHYSICIAN ASSISTANT

## 2020-06-18 PROCEDURE — 86901 BLOOD TYPING SEROLOGIC RH(D): CPT | Performed by: ORTHOPAEDIC SURGERY

## 2020-06-18 PROCEDURE — 25000128 H RX IP 250 OP 636: Performed by: STUDENT IN AN ORGANIZED HEALTH CARE EDUCATION/TRAINING PROGRAM

## 2020-06-18 PROCEDURE — 25000132 ZZH RX MED GY IP 250 OP 250 PS 637: Performed by: ORTHOPAEDIC SURGERY

## 2020-06-18 PROCEDURE — 40000985 XR PELVIS PORT 1/2 VW

## 2020-06-18 PROCEDURE — 82947 ASSAY GLUCOSE BLOOD QUANT: CPT | Performed by: STUDENT IN AN ORGANIZED HEALTH CARE EDUCATION/TRAINING PROGRAM

## 2020-06-18 PROCEDURE — 99213 OFFICE O/P EST LOW 20 MIN: CPT | Performed by: INTERNAL MEDICINE

## 2020-06-18 PROCEDURE — C1776 JOINT DEVICE (IMPLANTABLE): HCPCS | Performed by: ORTHOPAEDIC SURGERY

## 2020-06-18 PROCEDURE — 97161 PT EVAL LOW COMPLEX 20 MIN: CPT | Mod: GP

## 2020-06-18 PROCEDURE — 37000009 ZZH ANESTHESIA TECHNICAL FEE, EACH ADDTL 15 MIN: Performed by: ORTHOPAEDIC SURGERY

## 2020-06-18 PROCEDURE — 27210794 ZZH OR GENERAL SUPPLY STERILE: Performed by: ORTHOPAEDIC SURGERY

## 2020-06-18 DEVICE — IMP SCR ZIM 6.5X30MM ACET CUP SELF TAP 00-6250-065-30: Type: IMPLANTABLE DEVICE | Site: HIP | Status: FUNCTIONAL

## 2020-06-18 DEVICE — IMPLANTABLE DEVICE: Type: IMPLANTABLE DEVICE | Site: HIP | Status: FUNCTIONAL

## 2020-06-18 DEVICE — IMPLANTABLE DEVICE
Type: IMPLANTABLE DEVICE | Site: HIP | Status: FUNCTIONAL
Brand: G7 ACETABULAR LINER

## 2020-06-18 DEVICE — IMPLANTABLE DEVICE
Type: IMPLANTABLE DEVICE | Site: HIP | Status: FUNCTIONAL
Brand: TAPERLOC COMPLETE PRIMARY FEMORAL

## 2020-06-18 DEVICE — IMPLANTABLE DEVICE
Type: IMPLANTABLE DEVICE | Site: HIP | Status: FUNCTIONAL
Brand: G7® ACETABULAR SYSTEM

## 2020-06-18 RX ORDER — ACETAMINOPHEN 325 MG/1
650 TABLET ORAL EVERY 4 HOURS PRN
Qty: 1 BOTTLE | Refills: 0 | Status: ON HOLD | OUTPATIENT
Start: 2020-06-21 | End: 2020-08-24

## 2020-06-18 RX ORDER — POLYETHYLENE GLYCOL 3350 17 G/17G
17 POWDER, FOR SOLUTION ORAL DAILY
Status: DISCONTINUED | OUTPATIENT
Start: 2020-06-18 | End: 2020-06-19 | Stop reason: HOSPADM

## 2020-06-18 RX ORDER — CEFAZOLIN SODIUM 2 G/100ML
2 INJECTION, SOLUTION INTRAVENOUS
Status: COMPLETED | OUTPATIENT
Start: 2020-06-18 | End: 2020-06-18

## 2020-06-18 RX ORDER — ASPIRIN 81 MG/1
81 TABLET ORAL 2 TIMES DAILY
Status: CANCELLED | OUTPATIENT
Start: 2020-06-19

## 2020-06-18 RX ORDER — FENTANYL CITRATE 50 UG/ML
25-50 INJECTION, SOLUTION INTRAMUSCULAR; INTRAVENOUS
Status: DISCONTINUED | OUTPATIENT
Start: 2020-06-18 | End: 2020-06-18 | Stop reason: HOSPADM

## 2020-06-18 RX ORDER — OXYCODONE HYDROCHLORIDE 5 MG/1
5-10 TABLET ORAL
Status: DISCONTINUED | OUTPATIENT
Start: 2020-06-18 | End: 2020-06-18

## 2020-06-18 RX ORDER — CEFAZOLIN SODIUM 1 G/3ML
1 INJECTION, POWDER, FOR SOLUTION INTRAMUSCULAR; INTRAVENOUS SEE ADMIN INSTRUCTIONS
Status: DISCONTINUED | OUTPATIENT
Start: 2020-06-18 | End: 2020-06-18 | Stop reason: HOSPADM

## 2020-06-18 RX ORDER — ASPIRIN 81 MG/1
81 TABLET ORAL 2 TIMES DAILY
Status: DISCONTINUED | OUTPATIENT
Start: 2020-06-19 | End: 2020-06-19 | Stop reason: HOSPADM

## 2020-06-18 RX ORDER — SODIUM CHLORIDE, SODIUM LACTATE, POTASSIUM CHLORIDE, CALCIUM CHLORIDE 600; 310; 30; 20 MG/100ML; MG/100ML; MG/100ML; MG/100ML
INJECTION, SOLUTION INTRAVENOUS CONTINUOUS
Status: DISCONTINUED | OUTPATIENT
Start: 2020-06-18 | End: 2020-06-18 | Stop reason: HOSPADM

## 2020-06-18 RX ORDER — CEFAZOLIN SODIUM 1 G/3ML
1 INJECTION, POWDER, FOR SOLUTION INTRAMUSCULAR; INTRAVENOUS EVERY 8 HOURS
Status: COMPLETED | OUTPATIENT
Start: 2020-06-18 | End: 2020-06-19

## 2020-06-18 RX ORDER — NALOXONE HYDROCHLORIDE 0.4 MG/ML
.1-.4 INJECTION, SOLUTION INTRAMUSCULAR; INTRAVENOUS; SUBCUTANEOUS
Status: DISCONTINUED | OUTPATIENT
Start: 2020-06-18 | End: 2020-06-18 | Stop reason: HOSPADM

## 2020-06-18 RX ORDER — PROCHLORPERAZINE MALEATE 5 MG
5 TABLET ORAL EVERY 6 HOURS PRN
Status: DISCONTINUED | OUTPATIENT
Start: 2020-06-18 | End: 2020-06-19 | Stop reason: HOSPADM

## 2020-06-18 RX ORDER — ONDANSETRON 2 MG/ML
4 INJECTION INTRAMUSCULAR; INTRAVENOUS EVERY 6 HOURS PRN
Status: DISCONTINUED | OUTPATIENT
Start: 2020-06-18 | End: 2020-06-19 | Stop reason: HOSPADM

## 2020-06-18 RX ORDER — SODIUM CHLORIDE, SODIUM LACTATE, POTASSIUM CHLORIDE, CALCIUM CHLORIDE 600; 310; 30; 20 MG/100ML; MG/100ML; MG/100ML; MG/100ML
INJECTION, SOLUTION INTRAVENOUS CONTINUOUS
Status: DISCONTINUED | OUTPATIENT
Start: 2020-06-18 | End: 2020-06-19 | Stop reason: HOSPADM

## 2020-06-18 RX ORDER — DEXAMETHASONE SODIUM PHOSPHATE 4 MG/ML
INJECTION, SOLUTION INTRA-ARTICULAR; INTRALESIONAL; INTRAMUSCULAR; INTRAVENOUS; SOFT TISSUE PRN
Status: DISCONTINUED | OUTPATIENT
Start: 2020-06-18 | End: 2020-06-18

## 2020-06-18 RX ORDER — PROPOFOL 10 MG/ML
INJECTION, EMULSION INTRAVENOUS CONTINUOUS PRN
Status: DISCONTINUED | OUTPATIENT
Start: 2020-06-18 | End: 2020-06-18

## 2020-06-18 RX ORDER — ONDANSETRON 4 MG/1
4 TABLET, ORALLY DISINTEGRATING ORAL EVERY 30 MIN PRN
Status: DISCONTINUED | OUTPATIENT
Start: 2020-06-18 | End: 2020-06-18 | Stop reason: HOSPADM

## 2020-06-18 RX ORDER — NALOXONE HYDROCHLORIDE 0.4 MG/ML
.1-.4 INJECTION, SOLUTION INTRAMUSCULAR; INTRAVENOUS; SUBCUTANEOUS
Status: DISCONTINUED | OUTPATIENT
Start: 2020-06-18 | End: 2020-06-19 | Stop reason: HOSPADM

## 2020-06-18 RX ORDER — GLYCOPYRROLATE 0.2 MG/ML
INJECTION, SOLUTION INTRAMUSCULAR; INTRAVENOUS PRN
Status: DISCONTINUED | OUTPATIENT
Start: 2020-06-18 | End: 2020-06-18

## 2020-06-18 RX ORDER — KETOROLAC TROMETHAMINE 30 MG/ML
INJECTION, SOLUTION INTRAMUSCULAR; INTRAVENOUS PRN
Status: DISCONTINUED | OUTPATIENT
Start: 2020-06-18 | End: 2020-06-18

## 2020-06-18 RX ORDER — ACETAMINOPHEN 325 MG/1
650 TABLET ORAL EVERY 4 HOURS PRN
Status: DISCONTINUED | OUTPATIENT
Start: 2020-06-21 | End: 2020-06-19 | Stop reason: HOSPADM

## 2020-06-18 RX ORDER — POLYETHYLENE GLYCOL 3350 17 G/17G
17 POWDER, FOR SOLUTION ORAL DAILY
Qty: 7 PACKET | Refills: 0 | Status: ON HOLD | OUTPATIENT
Start: 2020-06-18 | End: 2020-08-24

## 2020-06-18 RX ORDER — METHOCARBAMOL 500 MG/1
TABLET, FILM COATED ORAL
Status: DISPENSED
Start: 2020-06-18 | End: 2020-06-19

## 2020-06-18 RX ORDER — BUPIVACAINE HYDROCHLORIDE 7.5 MG/ML
INJECTION, SOLUTION INTRASPINAL PRN
Status: DISCONTINUED | OUTPATIENT
Start: 2020-06-18 | End: 2020-06-18

## 2020-06-18 RX ORDER — LIDOCAINE 40 MG/G
CREAM TOPICAL
Status: DISCONTINUED | OUTPATIENT
Start: 2020-06-18 | End: 2020-06-19 | Stop reason: HOSPADM

## 2020-06-18 RX ORDER — AMOXICILLIN 250 MG
2 CAPSULE ORAL 2 TIMES DAILY
Qty: 30 TABLET | Refills: 0 | Status: ON HOLD | OUTPATIENT
Start: 2020-06-18 | End: 2020-08-24

## 2020-06-18 RX ORDER — CELECOXIB 200 MG/1
200 CAPSULE ORAL ONCE
Status: COMPLETED | OUTPATIENT
Start: 2020-06-18 | End: 2020-06-18

## 2020-06-18 RX ORDER — AMOXICILLIN 250 MG
2 CAPSULE ORAL 2 TIMES DAILY
Status: DISCONTINUED | OUTPATIENT
Start: 2020-06-18 | End: 2020-06-19 | Stop reason: HOSPADM

## 2020-06-18 RX ORDER — LIDOCAINE 40 MG/G
CREAM TOPICAL
Status: DISCONTINUED | OUTPATIENT
Start: 2020-06-18 | End: 2020-06-18 | Stop reason: HOSPADM

## 2020-06-18 RX ORDER — FENTANYL CITRATE 50 UG/ML
INJECTION, SOLUTION INTRAMUSCULAR; INTRAVENOUS PRN
Status: DISCONTINUED | OUTPATIENT
Start: 2020-06-18 | End: 2020-06-18

## 2020-06-18 RX ORDER — OXYCODONE HYDROCHLORIDE 5 MG/1
5 TABLET ORAL EVERY 4 HOURS PRN
Status: DISCONTINUED | OUTPATIENT
Start: 2020-06-18 | End: 2020-06-18

## 2020-06-18 RX ORDER — EPHEDRINE SULFATE 50 MG/ML
INJECTION, SOLUTION INTRAVENOUS PRN
Status: DISCONTINUED | OUTPATIENT
Start: 2020-06-18 | End: 2020-06-18

## 2020-06-18 RX ORDER — HYDROMORPHONE HCL/0.9% NACL/PF 0.2MG/0.2
.2-.4 SYRINGE (ML) INTRAVENOUS
Status: DISCONTINUED | OUTPATIENT
Start: 2020-06-18 | End: 2020-06-19 | Stop reason: HOSPADM

## 2020-06-18 RX ORDER — BISACODYL 10 MG
10 SUPPOSITORY, RECTAL RECTAL DAILY PRN
Status: DISCONTINUED | OUTPATIENT
Start: 2020-06-18 | End: 2020-06-19 | Stop reason: HOSPADM

## 2020-06-18 RX ORDER — OXYCODONE HYDROCHLORIDE 5 MG/1
5-10 TABLET ORAL
Qty: 30 TABLET | Refills: 0 | Status: ON HOLD | OUTPATIENT
Start: 2020-06-18 | End: 2020-08-24

## 2020-06-18 RX ORDER — MEPERIDINE HYDROCHLORIDE 25 MG/ML
12.5 INJECTION INTRAMUSCULAR; INTRAVENOUS; SUBCUTANEOUS
Status: DISCONTINUED | OUTPATIENT
Start: 2020-06-18 | End: 2020-06-18 | Stop reason: HOSPADM

## 2020-06-18 RX ORDER — SODIUM CHLORIDE, SODIUM LACTATE, POTASSIUM CHLORIDE, CALCIUM CHLORIDE 600; 310; 30; 20 MG/100ML; MG/100ML; MG/100ML; MG/100ML
INJECTION, SOLUTION INTRAVENOUS CONTINUOUS PRN
Status: DISCONTINUED | OUTPATIENT
Start: 2020-06-18 | End: 2020-06-18

## 2020-06-18 RX ORDER — HYDROMORPHONE HYDROCHLORIDE 1 MG/ML
.3-.5 INJECTION, SOLUTION INTRAMUSCULAR; INTRAVENOUS; SUBCUTANEOUS EVERY 10 MIN PRN
Status: DISCONTINUED | OUTPATIENT
Start: 2020-06-18 | End: 2020-06-18 | Stop reason: HOSPADM

## 2020-06-18 RX ORDER — METHOCARBAMOL 500 MG/1
500 TABLET, FILM COATED ORAL 4 TIMES DAILY PRN
Status: DISCONTINUED | OUTPATIENT
Start: 2020-06-18 | End: 2020-06-19 | Stop reason: HOSPADM

## 2020-06-18 RX ORDER — GABAPENTIN 100 MG/1
100 CAPSULE ORAL
Status: COMPLETED | OUTPATIENT
Start: 2020-06-18 | End: 2020-06-18

## 2020-06-18 RX ORDER — ACETAMINOPHEN 325 MG/1
975 TABLET ORAL ONCE
Status: COMPLETED | OUTPATIENT
Start: 2020-06-18 | End: 2020-06-18

## 2020-06-18 RX ORDER — ONDANSETRON 4 MG/1
4 TABLET, ORALLY DISINTEGRATING ORAL EVERY 6 HOURS PRN
Status: DISCONTINUED | OUTPATIENT
Start: 2020-06-18 | End: 2020-06-19 | Stop reason: HOSPADM

## 2020-06-18 RX ORDER — ACETAMINOPHEN 325 MG/1
975 TABLET ORAL EVERY 8 HOURS
Status: DISCONTINUED | OUTPATIENT
Start: 2020-06-18 | End: 2020-06-19 | Stop reason: HOSPADM

## 2020-06-18 RX ORDER — ONDANSETRON 2 MG/ML
INJECTION INTRAMUSCULAR; INTRAVENOUS PRN
Status: DISCONTINUED | OUTPATIENT
Start: 2020-06-18 | End: 2020-06-18

## 2020-06-18 RX ORDER — ONDANSETRON 2 MG/ML
4 INJECTION INTRAMUSCULAR; INTRAVENOUS EVERY 30 MIN PRN
Status: DISCONTINUED | OUTPATIENT
Start: 2020-06-18 | End: 2020-06-18 | Stop reason: HOSPADM

## 2020-06-18 RX ADMIN — ONDANSETRON 4 MG: 2 INJECTION INTRAMUSCULAR; INTRAVENOUS at 09:25

## 2020-06-18 RX ADMIN — BUPIVACAINE HYDROCHLORIDE IN DEXTROSE 1.5 ML: 7.5 INJECTION, SOLUTION SUBARACHNOID at 07:40

## 2020-06-18 RX ADMIN — GLYCOPYRROLATE 0.2 MG: 0.2 INJECTION, SOLUTION INTRAMUSCULAR; INTRAVENOUS at 08:31

## 2020-06-18 RX ADMIN — METHOCARBAMOL 500 MG: 500 TABLET, FILM COATED ORAL at 21:59

## 2020-06-18 RX ADMIN — ACETAMINOPHEN 975 MG: 325 TABLET, FILM COATED ORAL at 06:17

## 2020-06-18 RX ADMIN — TRANEXAMIC ACID 1 G: 100 INJECTION, SOLUTION INTRAVENOUS at 09:12

## 2020-06-18 RX ADMIN — HYDROMORPHONE HYDROCHLORIDE 0.3 MG: 1 INJECTION, SOLUTION INTRAMUSCULAR; INTRAVENOUS; SUBCUTANEOUS at 09:39

## 2020-06-18 RX ADMIN — ACETAMINOPHEN 975 MG: 325 TABLET, FILM COATED ORAL at 14:09

## 2020-06-18 RX ADMIN — CEFAZOLIN 2 G: 10 INJECTION, POWDER, FOR SOLUTION INTRAVENOUS at 07:40

## 2020-06-18 RX ADMIN — PROPOFOL 100 MCG/KG/MIN: 10 INJECTION, EMULSION INTRAVENOUS at 07:45

## 2020-06-18 RX ADMIN — SODIUM CHLORIDE, POTASSIUM CHLORIDE, SODIUM LACTATE AND CALCIUM CHLORIDE: 600; 310; 30; 20 INJECTION, SOLUTION INTRAVENOUS at 12:13

## 2020-06-18 RX ADMIN — FENTANYL CITRATE 50 MCG: 50 INJECTION, SOLUTION INTRAMUSCULAR; INTRAVENOUS at 07:32

## 2020-06-18 RX ADMIN — CELECOXIB 200 MG: 200 CAPSULE ORAL at 06:17

## 2020-06-18 RX ADMIN — SODIUM CHLORIDE, POTASSIUM CHLORIDE, SODIUM LACTATE AND CALCIUM CHLORIDE: 600; 310; 30; 20 INJECTION, SOLUTION INTRAVENOUS at 07:30

## 2020-06-18 RX ADMIN — DEXAMETHASONE SODIUM PHOSPHATE 6 MG: 4 INJECTION, SOLUTION INTRAMUSCULAR; INTRAVENOUS at 08:00

## 2020-06-18 RX ADMIN — HYDROMORPHONE HYDROCHLORIDE 0.2 MG: 1 INJECTION, SOLUTION INTRAMUSCULAR; INTRAVENOUS; SUBCUTANEOUS at 09:55

## 2020-06-18 RX ADMIN — CEFAZOLIN 1 G: 1 INJECTION, POWDER, FOR SOLUTION INTRAMUSCULAR; INTRAVENOUS at 16:03

## 2020-06-18 RX ADMIN — EPHEDRINE SULFATE 10 MG: 50 INJECTION, SOLUTION INTRAVENOUS at 07:58

## 2020-06-18 RX ADMIN — TRANEXAMIC ACID 1 G: 100 INJECTION, SOLUTION INTRAVENOUS at 07:35

## 2020-06-18 RX ADMIN — GLYCOPYRROLATE 0.2 MG: 0.2 INJECTION, SOLUTION INTRAMUSCULAR; INTRAVENOUS at 08:04

## 2020-06-18 RX ADMIN — PHENYLEPHRINE HYDROCHLORIDE 0.2 MCG/KG/MIN: 10 INJECTION INTRAVENOUS at 07:51

## 2020-06-18 RX ADMIN — Medication 0.2 MG: at 18:09

## 2020-06-18 RX ADMIN — FAMOTIDINE 20 MG: 10 INJECTION, SOLUTION INTRAVENOUS at 09:24

## 2020-06-18 RX ADMIN — EPHEDRINE SULFATE 10 MG: 50 INJECTION, SOLUTION INTRAVENOUS at 08:31

## 2020-06-18 RX ADMIN — GABAPENTIN 100 MG: 100 CAPSULE ORAL at 06:17

## 2020-06-18 RX ADMIN — DOCUSATE SODIUM 50MG AND SENNOSIDES 8.6MG 2 TABLET: 8.6; 5 TABLET, FILM COATED ORAL at 21:59

## 2020-06-18 RX ADMIN — KETOROLAC TROMETHAMINE 15 MG: 30 INJECTION, SOLUTION INTRAMUSCULAR at 09:28

## 2020-06-18 RX ADMIN — ACETAMINOPHEN 975 MG: 325 TABLET, FILM COATED ORAL at 21:59

## 2020-06-18 ASSESSMENT — MIFFLIN-ST. JEOR: SCORE: 1578

## 2020-06-18 NOTE — PLAN OF CARE
VS: VSS, pt denies CP or SOB. On frequent V/S post OP.    O2: Room air sat. > 90% on CAPNO.    Output: Voided 175 ml  recently with 725 PVR and straight cath. For 760 ml.    Last BM: 06/18/10   Activity: Pt was up walked on the cyr with PT and up to bathroom using walker, and SBA.    Skin: Intact except surgical incision.   Pain: Comfortably manageable with PRN medication.    CMS: CMS and neuro intact.    Dressing: CDI.    Diet: Regular tolerating without N/V.    LDA: PIV infusing.    Equipment: IV pole, CAPNO and personal belongings.    Plan: TBD.    Additional Info: Pt arrived on 5 ortho about 12 pm via bed, oriented to room and call light, pt resting comfortable will continue to monitor.

## 2020-06-18 NOTE — ANESTHESIA CARE TRANSFER NOTE
Patient: Tyson Fregoso    Procedure(s):  ARTHROPLASTY, HIP, TOTAL LEFT    Diagnosis: Primary localized osteoarthritis of left hip [M16.12]  Diagnosis Additional Information: No value filed.    Anesthesia Type:   Spinal, MAC     Note:  Airway :Face Mask  Patient transferred to:PACU  Comments: Report to Saul, RNs    Pt awake, initially had pain. Rx Dilaudid and Local Rx to Dr Waters improving comfort by PACU.  Pt A/O  127/64  HR at baseline 57, SR  SaO2 100 % with O2 FMHandoff Report: Identifed the Patient, Identified the Reponsible Provider, Reviewed the pertinent medical history, Discussed the surgical course, Reviewed Intra-OP anesthesia mangement and issues during anesthesia, Set expectations for post-procedure period and Allowed opportunity for questions and acknowledgement of understanding      Vitals: (Last set prior to Anesthesia Care Transfer)    CRNA VITALS  6/18/2020 0911 - 6/18/2020 1002      6/18/2020             SpO2:  100 %    EKG:  Sinus bradycardia                Electronically Signed By: JOESPH Griffith CRNA  June 18, 2020  10:02 AM

## 2020-06-18 NOTE — BRIEF OP NOTE
[facility], [place]    Brief Operative Note    Pre-operative diagnosis: Primary localized osteoarthritis of left hip [M16.12]  Post-operative diagnosis Same as pre-operative diagnosis    Procedure: Procedure(s):  ARTHROPLASTY, HIP, TOTAL LEFT  Surgeon: Surgeon(s) and Role:     * Logan Waters MD - Primary     * Tree Morgan MD - Resident - Assisting     * Logan Blackwell PA-C  Anesthesia: Spinal   Estimated blood loss: 200 ml  Drains: None  Specimens: * No specimens in log *  Findings:   Advanced DJD let hip.  Complications: intraop medial calcar femur fracture; identified and fixed with [employer] cables  Implants:   Implant Name Type Inv. Item Serial No.  Lot No. LRB No. Used Action   IMP SHELL BIOM G7 ACETAB PPS BROWN HOLE 60MM SZ G [id number] Total Joint Component/Insert IMP SHELL BIOM G7 ACETAB PPS BROWN HOLE 60MM SZ G [id number]  [employer] [id number] Left 1 Implanted   IMP SCR ZIM 6.5X30MM ACET CUP SELF TAP -393-30 Metallic Hardware/[place] IMP SCR ZIM 6.5X30MM ACET CUP SELF TAP -217-30  [employer] [id number] Left 1 Implanted   IMP LINER BIOM G7 ACET PITA ARCOMXL CRSLNK 36MM SZ G [id number] Total Joint Component/Insert IMP LINER BIOM G7 ACET PITA ARCOMXL CRSLNK 36MM SZ G [id number]  [employer] [id number] Left 1 Implanted   [place] Complete Primary Femorl Porous Coated Stem Reduced  Distal     [id number] Left 1 Implanted   IMP CABLE ZIM CERCLAGE 1.8X25MM 2232-04-18 Metallic Hardware/[place] IMP CABLE ZIM CERCLAGE 1.8X25MM 2232-04-18  [employer] [id number] Left 1 Implanted   IMP CABLE ZIM CERCLAGE 1.8X25MM 2232-04-18 Metallic Hardware/[place] IMP CABLE ZIM CERCLAGE 1.8X25MM 2232-04-18  [employer] [id number] Left 1 Implanted   Modular Ceramic Head    BIOMET [id number] Left 1 Implanted     Post-Op Plan:  Assessment/Plan: Tyson Fregoso is a 70 year old male with primary osteoarthritis of the left hip now  s/p primary left MAXIME on 6/18  with Dr. Waters. Patient sustained an intraoperative femoral calcar fracture fixed with cables and will thus be 50% wt bearing x2 weeks    NOTE* 50% Wt Bearing LLE    Activity: Up with assist and assistive devices as needed until independent. Posterior hip precautions  Weight bearing status: 50% wt bearing LLE x 2 weeks; walker  Antibiotics: Ancef x 24 hours.  Diet: Begin with clear fluids and progress diet as tolerated. Bowel regimen. Anti-emetics PRN.   DVT prophylaxis: mechanical and ASA while in hospital, discharge on ASA 81mg BID x 6 weeks.  Wound Care: Dressing x 7 days.    Pain management: Orals as needed. IV for breakthrough pain only.  X-rays: AP Pelvis and Lateral Left hip in PACU.   Physical Therapy: Mobilization, ROM, ADL's, Posterior hip precautions.    Occupational Therapy: ADL's  Labs: Trend Hgb on POD #1, 2  Consults: PT, OT. Hospitalist, appreciate assistance in caring for this patient.   Follow-up: Clinic in 2 weeks for wound check and XR; then at 6 weeks post op with repeat XR     Disposition: Pending progress with therapies, pain control on orals, and medical stability, anticipate discharge to Home on POD #1-2.    Tree Morgan MD   Orthopaedic Surgery Resident  Pager: [phone]

## 2020-06-18 NOTE — CONSULTS
INTERNAL MEDICINE CONSULTATION     REQUESTING PHYSICIAN: Logan Waters MD    REASON FOR CONSULTATION: For recommendations for medical comorbidities.     RECOMMENDATIONS ;  Tyson Fregoso is a 70 year old male admitted on 8/18 after hip surgery     # S/P Left MAXIME   .   . Preop Hb 14.5  Analgesia: per ortho   DVT prophylaxis:- per ortho   Abx and wound care as per primary team.   Intensive spirometry to prevent atelectasis     # no history of DM or Hypertension or CAD    # Smoking history ; remote       Thank you for the consult.      Dr Roxi Miramontes  Hospitalist ( Internal medicine)  Pager: [phone]      CHIEF COMPLAINT: 70 year old year old male admitted after surgery     HISTORY OF PRESENT ILLNESS:   70 year old year old male  admitted on 6/18/2020 after  Left MAXIME (for further details for indication of surgery and operative note, please refer to Logan Waters MD note) . Had 200 c  , fluids given   No reported  hypotension/ hypoxemia intra/post operative.      Currently: Denies any chest pain, shortness of breath or palpitations. Denies any nausea, vomiting or bowel symptoms.Denies any dysuria or frequency of urination.    PAST MEDICAL HISTORY: No past medical history on file.  Past Surgical History:   Procedure Laterality Date     LAMINECTOMY LUMBAR TWO LEVELS N/A 1/8/2020    Procedure: Lumbar 3 to 4 decompression and discectomy and left Lumbar 4-5 Hemilaminectomy;  Surgeon: Jan Ward MD;  Location: [facility] OR     microdiscectomy  2001    L4-5     SHOULDER SURGERY Bilateral     arthroscopies (Left 2005, right 2013)       Family History   Problem Relation Age of Onset     Deep Vein Thrombosis Brother      Deep Vein Thrombosis (DVT) Son      Anesthesia Reaction No family hx of      Cardiovascular No family hx of        Social History     Socioeconomic History     Marital status:      Spouse name: None     Number of children: None     Years of education: None      Highest education level: None   Occupational History     None   Social Needs     Financial resource strain: None     Food insecurity     Worry: None     Inability: None     Transportation needs     Medical: None     Non-medical: None   Tobacco Use     Smoking status: Former Smoker     Packs/day: 0.50     Years: 17.00     Pack years: 8.50     Last attempt to quit:      Years since quittin.4     Smokeless tobacco: Never Used   Substance and Sexual Activity     Alcohol use: Yes     Alcohol/week: 3.0 standard drinks     Types: 3 Cans of beer per week     Drinks per session: 3 or 4     Binge frequency: Daily or almost daily     Drug use: None     Sexual activity: None   Lifestyle     Physical activity     Days per week: None     Minutes per session: None     Stress: None   Relationships     Social connections     Talks on phone: None     Gets together: None     Attends [employer] service: None     Active member of club or organization: None     Attends meetings of clubs or organizations: None     Relationship status: None     Intimate partner violence     Fear of current or ex partner: None     Emotionally abused: None     Physically abused: None     Forced sexual activity: None   Other Topics Concern     None   Social History Narrative     None       ALLERGIES:   No Known Allergies    HOME MEDICATIONS:   No current facility-administered medications on file prior to encounter.   acetaminophen (TYLENOL) 325 MG tablet, Take 325-650 mg by mouth every 6 hours as needed for mild pain  glucosamine-chondroitin 500-400 MG CAPS per capsule, Take 1 capsule by mouth daily (with lunch)  multivitamin w/minerals (MULTI-VITAMIN) tablet, Take 1 tablet by mouth every morning  gabapentin (NEURONTIN) 300 MG capsule, Take 300 mg by mouth At Bedtime   HYDROcodone-acetaminophen (NORCO) 5-325 MG tablet, Take 1 tablet by mouth every 4 hours as needed for severe pain (Patient not taking: Reported on 2020)  naproxen sodium 220 MG  capsule, Take 220 mg by mouth 2 times daily (with meals) Aleve  senna-docusate (SENOKOT-S/PERICOLACE) 8.6-50 MG tablet, Take 1 tablet by mouth 2 times daily as needed for constipation (Patient not taking: Reported on 2/18/2020)        REVIEW OF SYSTEMS: A comprehensive 10-point review of systems was done and was found negative unless mentioned in the HPI.     PHYSICAL EXAMINATION:   Vitals: /65   Pulse 56   Temp 97.5  F (36.4  C) (Oral)   Resp 16   Ht 1.829 m (6')   Wt 79 kg (174 lb 2.6 oz)   SpO2 97%   BMI 23.62 kg/m    BMI= Body mass index is 23.62 kg/m .  GENERAL: Pleasant, inteactive  HEENT: PERRLA. Sclerae  anicteric. Oral mucosa moist.  oral hygiene adequate.   NECK: Supple. No thyromegaly.   CARDIOVASCULAR: Normal S1 and S2. No murmurs, rubs or gallops.   RESPIRATORY: Normal vesicular breath sounds. No wheezing or crackles.   ABDOMEN: Soft, nontender. No guarding, rigidity or rebound. No                        hepatosplenomegaly. Bowel sounds are positive.   EXTREMITIES: left hp dressed   NEUROLOGIC: Cranial nerves II-XII are grossly intact. Alert and oriented x3.   SKIN: No petechia, purpura or rash.   HEMATOLOGIC: No gross lymphadenopathy.     LABORATORY DATA:   Recent Results (from the past 24 hour(s))   UA with Microscopic reflex to Culture    Collection Time: 06/18/20  5:50 AM    Specimen: Urine clean catch; Midstream Urine   Result Value Ref Range    Color Urine Light Yellow     Appearance Urine Clear     Glucose Urine Negative NEG^Negative mg/dL    Bilirubin Urine Negative NEG^Negative    Ketones Urine Negative NEG^Negative mg/dL    Specific Gravity Urine 1.010 1.003 - 1.035    Blood Urine Negative NEG^Negative    pH Urine 5.5 5.0 - 7.0 pH    Protein Albumin Urine Negative NEG^Negative mg/dL    Urobilinogen mg/dL Normal 0.0 - 2.0 mg/dL    Nitrite Urine Negative NEG^Negative    Leukocyte Esterase Urine Negative NEG^Negative    Source Midstream Urine     WBC Urine <1 0 - 5 /HPF    RBC Urine  0 0 - 2 /HPF   ABO/Rh type and screen    Collection Time: 06/18/20  6:25 AM   Result Value Ref Range    ABO A     RH(D) Pos     Antibody Screen Neg     Test Valid Only At          [facility]    Specimen Expires 06/21/2020     Blood Bank Comment       Patient ID verified using a positive patient identification system or by two individuals   at time of collection.     Glucose    Collection Time: 06/18/20  6:25 AM   Result Value Ref Range    Glucose 102 (H) 70 - 99 mg/dL

## 2020-06-18 NOTE — ANESTHESIA PROCEDURE NOTES
Spinal/LP Procedure Note    Spinal Block  Staff -   Anesthesiologist:  Robert Guallpa MD      Performed By: anesthesiologist        Location: OR  Procedure Start/Stop Times:      6/18/2020 7:35 AM     6/18/2020 7:40 AM    patient identified, IV checked, site marked, risks and benefits discussed, informed consent, monitors and equipment checked, pre-op evaluation, at physician/surgeon's request and post-op pain management      Correct Patient: Yes      Correct Position: Yes      Correct Site: Yes      Correct Procedure: Yes      Correct Laterality:  Yes    Site Marked:  Yes  Procedure:     Procedure:  Intrathecal    ASA:  2    Diagnosis:  Hip pain    Position:  Sitting    Sterile Prep: chloraprep, sterile gloves and patient draped      Insertion site:  L2-3    Approach:  Midline    Needle Type:  Quincke    Needle gauge (G):  25    Local Skin Infiltration:  1% lidocaine    amount (ml):  3    Needle Length (in):  3.5    Introducer used: Yes      Introducer gauge:  20 G    Attempts:  1    Redirects:  2    CSF:  Clear    Paresthesias:  No  Assessment/Narrative:     Sensory Level:  T8

## 2020-06-18 NOTE — PLAN OF CARE
Discharge Planner PT   Patient plan for discharge: Home with assist  Current status: PT evaluation complete and treatment initiated. Educated on precautions following surgery. Completes supine<>sit transfer with HOB slightly elevated and light Britta to L LE. Sat EOB with good tolerance. Completes sit<>stand transfer with CGA and use of walker. Tolerates ambulating in cyr with walker and SBA, 50% WB maintained and safe throughout. Issued and completed supine exercises per protocol. Ended back in bed with all needs in reach  Barriers to return to prior living situation: safety, independence, precautions, stairs  Recommendations for discharge: Home with assist and eventual OP PT  Rationale for recommendations: For progression per protocol and return to PLOF       Entered by: Jolly Duncan 06/18/2020 3:39 PM

## 2020-06-18 NOTE — OP NOTE
PREOPERATIVE DIAGNOSIS: Primary osteoarthritis of the left hip  POSTOPERATIVE DIAGNOSIS: Same as pre-op.  PROCEDURE: Left Total Hip Arthroplasty  DATE OF SURGERY: 6/18/2020   ASSISTANTS: LAUREEN Flores, Tree Morgan MD    COMPONENTS USED:  1. Biomet Taperloc Size 14, High Offset  2. Biomet G7 Acetabular Component Size 60,   3. Biomet ArCOM XL Polyethylene Liner Neutral   4. Biolox Delta Ceramic Head Size 36+3   5. 1x30 Acetabular Screw 6.5 mm    ANESTHESIA: Spinal  COMPLICATIONS: Calcar fracture, fixed with cerclage cable x 2  EBL: 200cc    FINDINGS: Cartilage loss, irregularity of the femoral head, diffuse cartilage loss, osteophyte formation of the native acetabulum.  Acetabular component well positioned and secure with 1 screw.  Femoral component well positioned.  During impaction, small calcar split noted.  Cerclage cables placed and stable implants and fracture after placement.  Intraoperative stability with full extension and ER, flexion to 90 with IR to 70, and in the sleep position.  Appropriate limb length.  Intraoperative imaging with trial femoral component confirmed appropriate position of the implants.        INDICATIONS: Tyson Fregoso is a 70 year old male with longstanding history of left hip pain.  The patient was recently seen in clinic and found to have osteoarthritis of the hip.  The patient was followed and noted to have failed conservative treatment options. Radiographs and preoperative clinical symptoms are consistent with osteoarthritis as the cause of the patient's pain.   We discussed the risks, benefits, and alternatives to total hip arthroplasty with the patient.  Please see clinic note for full details of the preoperative discussion.  Following that the discussion, the patient elected to proceed with total hip arthroplasty.       DESCRIPTION OF PROCEDURE: We met the patient in the preoperative area.  There we again reviewed the risks, benefits, and alternatives to total  "hip arthroplasty.  Informed written consent was obtained.  The operative left hip was marked with an indelible marker after patient confirmation of the operative site.  All the patient's questions were answered.  The patient was taken to the operating room and underwent induction of  Spinal anesthesia.  The patient was placed on the operating table in the lateral decubitus position with the operative site up.   An SCD was placed on the nonoperative leg.   Bony prominences were well padded and was secured to the table with the Wixon positioner. The patient was prepped and draped in the normal sterile fashion.      A timeout was performed in which the patient's name, MRN, imaging, preoperative plan and laterality was reviewed.  We also confirmed that the patient received the appropriate preoperative IV antibiotics and 1 gm of TXA.       A posterior approach to the hip joint was performed making a incision over the greater trochanter and taking this down through the subcutaneous tissue. The gluteus keily and IT band was then split in line with its fibers. A charnley retractor was placed and hemostasis was obtained.  After internal rotation of the femur, the piriformis and obturator internus muscles were detached and the capsulotomy was performed in a standard \"L\" shape.  The capsule and pirformis was tagged.  The limb was positioned in the sleep position and a flaquita was made on the greater trochanter to help assess limb length and offset.  The hip was dislocated.  The soft tissues under the greater trochanter were debrided, exposing the neck.  The femoral neck osteotomy was made in accordance with the preoperative plan, approximately 15 mm above the level of the lesser trochanter.  The femoral head was removed and noted to have diffuse, extensive cartilage loss.     The acetabulum was exposed with an anterior retractor and posteroinferior retractor.  The remaining acetabular labrum was removed.  The patient was noted " to have anterior and inferior osteophytes.  The pulvinar was removed and the medial wall was identified.  We began reaming with care to maintain the anterior and posterior walls.  We began with a size 48 reamer and reamed to 59.  At this point the sclerotic bone was removed back to healthy bleeding cancelleous bone.  The 60 G7 cup was placed.  It had excellent initial stability.   Any prominent anterior and inferior osteophytes were debrided and carefully removed.  A neutral trial liner was placed.    Attention was turned to the femur, which was exposed in the standard fashion with a femoral elevator.  Care was taken to not damage the gluteus medius.  A box osteotome and rongeur was used to remove residual lateral neck.  The canal was identified and reamed with a Charnley Awl.  The femoral broaches was positioned in the appropriate anteversion, care was taken to broach the lateral cancellous bone.  We broached, starting with a size 4, up to size 14.  Care was taken to minimize risk of calcar fracture.  The final trial fit well.  A high offset neck was placed with a 36+0 head.  The hip was reduced.  The stability was examined and  was found to be secure and stable in full extension and external rotation of > 45 degrees as well as flexion of 90 degrees combined with internal rotation > 70 degrees.  Leg lengths were judged to be within 5 mm of symmetry.     The hip was dislocated and the trial femoral and cup liner were removed.  One acetabular screw was placed further securing the cup.   The cup was irrigated and the final neutral liner was placed.  The femur was exposed and the 14 stem with high offset was placed.  During impaction, a small calcar split was noted.  The stem was removed.  The fracture was evaluated and was non displaced after removal of the stem.  It did not propogate below the lesser or to the lesser.  A cable was placed proximal to the lesser trochanter and noted to have good fixation.  The stem was  then placed again and noted to seat well without further subsidence.  The fracture remained well reduced and the stem was stable. The hip was trialed with various head lengths and the 36+3 head had the best fit. .  Again, the  stability was examined and  was found to be secure and stable in full extension and external rotation of > 45 degrees as well as flexion of 90 degrees combined with internal rotation > 70 degrees.  Leg lengths were judged to be within 5 mm of symmetry as evaluated by limb palpation and the markings on the greater trochanter. The hip was dislocated and the trial head was removed.  The trunion was washed and dried and the final 36+3 head was impacted into place.  The hip was reduced and again stable as listed above.   A second cable was placed distal to the lesser.  An intraoperative x-ray was obtained and the implant was well fixed and positioned.  The cables were well positioned and there was no propagation of the fracture.    The hip was lavaged with dilute betaine irrigant followed by sterile saline.  Periarticular injection of ropivicaine, toradol, and epi was injected into the soft tissue.  The capsule, piriformis and external rotators were repaired to the medius tendon.  A second dose of TXA was given.  The IT and gluteal fascia was closed with #1 stratafix.  The deep dermal layer was closed with 2-0 spiral stratafix.  The skin was closed with 3-0 monocryl and dermabond and a sterile aquacel bandage was placed. The patient was positioned upright and awoken from anesthesia.  The patient was transferred to the PACU in stable condition.      POSTOPERATIVE PLAN:  The patient will be admitted to the floor for pain control and monitoring.  Weight bearin% WB x 2 weeks  Anticoagulation: ASA

## 2020-06-18 NOTE — PROGRESS NOTES
06/18/20 1526   Quick Adds   Type of Visit Initial PT Evaluation       Present no   Language English   Living Environment   Lives With spouse   Living Arrangements house   Home Accessibility stairs to enter home;stairs within home   Number of Stairs, Main Entrance 2   Stair Railings, Main Entrance none  (door jam left, pole on R)   Number of Stairs, Within Home, Primary 7   Stair Railings, Within Home, Primary railings on both sides of stairs   Transportation Anticipated family or friend will provide   Self-Care   Usual Activity Tolerance good   Current Activity Tolerance moderate   Regular Exercise Yes   Activity/Exercise Type biking   Exercise Amount/Frequency 3-5 times/wk   Equipment Currently Used at Home none  (has cane and walker)   Functional Level Prior   Ambulation 0-->independent   Transferring 0-->independent   Toileting 0-->independent   Bathing 0-->independent   Communication 0-->understands/communicates without difficulty   Swallowing 0-->swallows foods/liquids without difficulty   Cognition 0 - no cognition issues reported   Fall history within last six months no   Which of the above functional risks had a recent onset or change? transferring;ambulation   General Information   Onset of Illness/Injury or Date of Surgery - Date 06/18/20   Referring Physician Logan Blackwell PA-C    Patient/Family Goals Statement Be able to play with his grandchildren   Pertinent History of Current Problem (include personal factors and/or comorbidities that impact the POC) s/p L MAXIME   Precautions/Limitations fall precautions;left hip precautions   Weight-Bearing Status - LUE full weight-bearing   Weight-Bearing Status - RUE full weight-bearing   Weight-Bearing Status - LLE 50% WB   Weight-Bearing Status - RLE full weight-bearing   General Observations Supine in bed upon arrival, agreeable to PT   General Info Comments IV, capno   Cognitive Status Examination   Orientation orientation to  person, place and time   Level of Consciousness alert   Follows Commands and Answers Questions 100% of the time;able to follow multistep instructions   Personal Safety and Judgment intact   Memory intact   Pain Assessment   Patient Currently in Pain Yes, see Vital Sign flowsheet   Integumentary/Edema   Integumentary/Edema Comments Patient with normal post-surgical swelling present to L LE   Posture    Posture Forward head position;Protracted shoulders;Kyphosis   Posture Comments Mild sitting EOB and standing   Range of Motion (ROM)   ROM Comment Did not formally assess, demonstrates functional ROM with mobility   Strength   Strength Comments Did not formally assess, demonstrates functional strength with mobility   Bed Mobility   Bed Mobility Comments Completes supine<>sit transfer with HOB slightly elevated and light Britta to L LE   Transfer Skills   Transfer Comments Completes sit<>stand transfer with CGA and use of walker   Gait   Gait Comments Tolerates ambulating in cyr with SBA and use of walker, safe and steady throughout   Balance   Balance Comments Independent sitting balance, SBA for standing balance with UE support from walker   Sensory Examination   Sensory Perception no deficits were identified   General Therapy Interventions   Planned Therapy Interventions balance training;bed mobility training;gait training;ROM;strengthening;transfer training;risk factor education;home program guidelines;progressive activity/exercise   Clinical Impression   Criteria for Skilled Therapeutic Intervention yes, treatment indicated   PT Diagnosis impaired functional mobility   Influenced by the following impairments increased post-op pain, precautions, decreased L LE strength and ROM   Functional limitations due to impairments impaired bed mobility, transfers and ambulation   Clinical Presentation Stable/Uncomplicated   Clinical Presentation Rationale s/p L MAXIME, mobilizing well   Clinical Decision Making (Complexity) Low  "complexity   Therapy Frequency 2x/day   Predicted Duration of Therapy Intervention (days/wks) 2 days   Anticipated Equipment Needs at Discharge   (has most equipment, may need crutches for outside stairs)   Anticipated Discharge Disposition Home with Assist;Home with Outpatient Therapy   Risk & Benefits of therapy have been explained Yes   Patient, Family & other staff in agreement with plan of care Yes   [facility] TM \"6 Clicks\"   2016, Trustees of [facility], under license to [facility].  All rights reserved.   6 Clicks Short Forms Basic Mobility Inpatient Short Form   [facility]  \"6 Clicks\" V.2 Basic Mobility Inpatient Short Form   1. Turning from your back to your side while in a flat bed without using bedrails? 4 - None   2. Moving from lying on your back to sitting on the side of a flat bed without using bedrails? 3 - A Little   3. Moving to and from a bed to a chair (including a wheelchair)? 4 - None   4. Standing up from a chair using your arms (e.g., wheelchair, or bedside chair)? 4 - None   5. To walk in hospital room? 4 - None   6. Climbing 3-5 steps with a railing? 3 - A Little   Basic Mobility Raw Score (Score out of 24.Lower scores equate to lower levels of function) 22   Total Evaluation Time   Total Evaluation Time (Minutes) 8     "

## 2020-06-18 NOTE — OR NURSING
PACU to Inpatient Nursing Handoff    Patient Tyson Fregoso is a 70 year old male who speaks English.   Procedure Procedure(s):  ARTHROPLASTY, HIP, TOTAL LEFT   Surgeon(s) Primary: Logan Waters MD  Resident - Assisting: Tree Morgan MD     No Known Allergies    Isolation  [unfilled]     Past Medical History   has no past medical history on file.    Anesthesia Spinal   Dermatome Level Dermatomes Left: L4  Dermatomes Right: L5   Preop Meds acetaminophen (Tylenol) - time given: 617  celecoxib (Celebrex) - time given: 617  gabapentin (Neurontin) - time given: 617   Nerve block Not applicable   Intraop Meds dexamethasone (Decadron)  famotidine (Pepcid): last given at 0924  fentanyl (Sublimaze): 50 mcg total  hydromorphone (Dilaudid): .5 mg total  ketorolac (Toradol): last given at 0928  ondansetron (Zofran): last given at 0925  phenlephrine, ephedrine, TXA   Local Meds Yes - Local Cocktail (morphine, ropivacaine, epinephrine, Toradol)   Antibiotics cefazolin (Ancef) - last given at 0924     Pain Patient Currently in Pain: denies  Comfort: comfortably manageable  Pain Control: fully effective   PACU meds  Not applicable   PCA / epidural No   Capnography     Telemetry ECG Rhythm: Normal sinus rhythm   Inpatient Telemetry Monitor Ordered? No        Labs Glucose Lab Results   Component Value Date     06/18/2020       Hgb Lab Results   Component Value Date    HGB 14.5 12/10/2019       INR No results found for: INR   PACU Imaging Completed     Wound/Incision Incision/Surgical Site 01/08/20 Midline;Posterior;Lower Back (Active)   Number of days: 162       Incision/Surgical Site 01/08/20 Back (Active)   Number of days: 162       Incision/Surgical Site 06/18/20 Left Hip (Active)   Incision Assessment UTV 06/18/20 1015   Closure Sutures;Liquid bandage 06/18/20 1015   Incision Drainage Amount None 06/18/20 1015   Incision Care Normal saline 06/18/20 0925   Dressing Intervention Clean, dry,  intact 06/18/20 1015   Number of days: 0      CMS        Equipment ice pack   Other LDA       IV Access Peripheral IV 01/08/20 Upper arm (Active)   Number of days: 162       Peripheral IV 06/18/20 Right Hand (Active)   Site Assessment WDL 06/18/20 1045   Line Status Infusing 06/18/20 1045   Phlebitis Scale 0-->no symptoms 06/18/20 1045   Infiltration Scale 0 06/18/20 1045   Extravasation? No 06/18/20 1045   Number of days: 0      Blood Products Not applicable  mL   Intake/Output Date 06/18/20 0700 - 06/19/20 0659   Shift 6672-0698 1229-6222 3020-3199 24 Hour Total   INTAKE   P.O. 120   120   I.V. 750   750   Shift Total(mL/kg) 870(11.01)   870(11.01)   OUTPUT   Urine 800   800   Blood 200   200   Shift Total(mL/kg) 1000(12.66)   1000(12.66)   Weight (kg) 79 79 79 79      Drains / Avila     Time of void PreOp Void Prior to Procedure: 0600 (06/18/20 0605)    PostOp Straight cath: 800 mL (06/18/20 1015)    Diapered? No   Bladder Scan Bladder Scan Volume (mL): 706 ml (06/18/20 1000)    mL(water) (06/18/20 1015)  crackers, water and ice chips     Vitals    B/P: 119/65  T: 97.5  F (36.4  C)    Temp src: Oral  P:  Pulse: 56 (06/18/20 1030)    Heart Rate: 55 (06/18/20 1030)     R: 16  O2:  SpO2: 97 %    O2 Device: None (Room air) (06/18/20 1030)    Oxygen Delivery: 6 LPM (06/18/20 0945)         Family/support present no family at bedside   Patient belongings     Patient transported on bed   DC meds/scripts (obs/outpt) Not applicable   Inpatient Pain Meds Released? Yes       Special needs/considerations None   Tasks needing completion None       Thuy Keane, RN  [facility]COM [id number]

## 2020-06-18 NOTE — ANESTHESIA POSTPROCEDURE EVALUATION
Anesthesia POST Procedure Evaluation    Patient: Tyson Fregoso   MRN:     [id number] Gender:   male   Age:    70 year old :      1950        Preoperative Diagnosis: Primary localized osteoarthritis of left hip [M16.12]   Procedure(s):  ARTHROPLASTY, HIP, TOTAL LEFT   Postop Comments: No value filed.     Anesthesia Type: Spinal, MAC       Disposition: Outpatient   Postop Pain Control: Uneventful            Sign Out: Well controlled pain   PONV: No   Neuro/Psych: Uneventful            Sign Out: Acceptable/Baseline neuro status   Airway/Respiratory: Uneventful            Sign Out: Acceptable/Baseline resp. status   CV/Hemodynamics: Uneventful            Sign Out: Acceptable CV status   Other NRE: NONE   DID A NON-ROUTINE EVENT OCCUR? No         Last Anesthesia Record Vitals:  CRNA VITALS  2020 0911 - 2020 1011      2020             SpO2:  100 %    EKG:  Sinus bradycardia          Last PACU Vitals:  Vitals Value Taken Time   /65 2020 10:30 AM   Temp 36.4  C (97.5  F) 2020 10:15 AM   Pulse 56 2020 10:30 AM   Resp 27 2020 10:36 AM   SpO2 97 % 2020 10:39 AM   Temp src     NIBP 127/62 2020  9:50 AM   Pulse     SpO2 100 % 2020  9:55 AM   Resp     Temp 36.4  C (97.5  F) 2020  9:52 AM   Ht Rate 58 2020  9:50 AM   Temp 2     Vitals shown include unvalidated device data.      Electronically Signed By: Robert Guallpa MD, 2020, 10:39 AM

## 2020-06-18 NOTE — PROGRESS NOTES
Orthopaedic Surgery Progress Note:       Subjective:   NAEO. Patient reports doing well. Pain well controlled on current regimen. In good spirits. Doing well with 50% wt bearing. Soreness to mid-lateral thigh. Tolerating PO.  Denies new onset tingling/numbness in operative extremity. Denies fever/chills/SOB/nause/vomiting.     Objective:   Temp:  [97.4  F (36.3  C)-97.5  F (36.4  C)] 97.4  F (36.3  C)  Pulse:  [54-61] 61  Heart Rate:  [55-63] 55  Resp:  [12-22] 16  BP: (109-140)/(56-76) 135/66  SpO2:  [95 %-100 %] 97 %      Gen: NAD. Resting comfortably in bed  Resp: Breathing comfortably on RA  LLE:  Dressing is c/d/i.  SILT in femoral, saphenous, sural, deep peroneal, superficial peroneal, and tibial n dist.   Fires EHL/FHL/TA/Gastroc with 5/5 strength   PT and DP pulses intact, 2+ and foot is wwp    Labs:  No lab results found in last 7 days.    Invalid input(s): SEDRATE    Post Op AP Pelvis - demonstrates no obvious propagating fracture. Cables in place. Stem and cup in appropriate orientation and stable     Assessment & Plan:   Post-Op Plan:  Assessment/Plan: Tyson Fregoso is a 70 year old male with primary osteoarthritis of the left hip now  s/p primary left MAXIME on 6/18 with Dr. Waters. Patient sustained an intraoperative femoral calcar fracture fixed with cables and will thus be 50% wt bearing x2 weeks     NOTE* 50% Wt Bearing LLE     Activity: Up with assist and assistive devices as needed until independent. Posterior hip precautions  Weight bearing status: 50% wt bearing LLE x 2 weeks; walker  Antibiotics: Ancef x 24 hours.  Diet: Begin with clear fluids and progress diet as tolerated. Bowel regimen. Anti-emetics PRN.   DVT prophylaxis: mechanical and ASA while in hospital, discharge on ASA 81mg BID x 6 weeks.  Wound Care: Dressing x 7 days.    Pain management: Orals as needed. IV for breakthrough pain only.  X-rays: AP Pelvis and Lateral Left hip in PACU.   Physical Therapy: Mobilization, ROM, ADL's,  Posterior hip precautions.    Occupational Therapy: ADL's  Labs: Trend Hgb on POD #1, 2  Consults: PT, OT. Hospitalist, appreciate assistance in caring for this patient.   Follow-up: Clinic in 2 weeks for wound check and XR; then at 6 weeks post op with repeat XR      Disposition: Pending progress with therapies, pain control on orals, and medical stability, anticipate discharge to Home on POD #1-2.     Tree Morgan MD   Orthopaedic Surgery Resident  Pager: [phone]

## 2020-06-19 ENCOUNTER — APPOINTMENT (OUTPATIENT)
Dept: OCCUPATIONAL THERAPY | Facility: CLINIC | Age: 70
End: 2020-06-19
Attending: ORTHOPAEDIC SURGERY
Payer: COMMERCIAL

## 2020-06-19 ENCOUNTER — APPOINTMENT (OUTPATIENT)
Dept: PHYSICAL THERAPY | Facility: CLINIC | Age: 70
End: 2020-06-19
Attending: PHYSICIAN ASSISTANT
Payer: COMMERCIAL

## 2020-06-19 VITALS
HEART RATE: 58 BPM | DIASTOLIC BLOOD PRESSURE: 61 MMHG | RESPIRATION RATE: 16 BRPM | TEMPERATURE: 97.5 F | SYSTOLIC BLOOD PRESSURE: 118 MMHG | BODY MASS INDEX: 23.59 KG/M2 | OXYGEN SATURATION: 97 % | WEIGHT: 174.16 LBS | HEIGHT: 72 IN

## 2020-06-19 LAB — HGB BLD-MCNC: 11 G/DL (ref 13.3–17.7)

## 2020-06-19 PROCEDURE — 97165 OT EVAL LOW COMPLEX 30 MIN: CPT | Mod: GO | Performed by: OCCUPATIONAL THERAPIST

## 2020-06-19 PROCEDURE — 97110 THERAPEUTIC EXERCISES: CPT | Mod: GP | Performed by: PHYSICAL THERAPIST

## 2020-06-19 PROCEDURE — 99207 ZZC CDG-CODE CATEGORY CHANGED: CPT | Performed by: INTERNAL MEDICINE

## 2020-06-19 PROCEDURE — 97535 SELF CARE MNGMENT TRAINING: CPT | Mod: GO | Performed by: OCCUPATIONAL THERAPIST

## 2020-06-19 PROCEDURE — 25000132 ZZH RX MED GY IP 250 OP 250 PS 637: Performed by: STUDENT IN AN ORGANIZED HEALTH CARE EDUCATION/TRAINING PROGRAM

## 2020-06-19 PROCEDURE — 97116 GAIT TRAINING THERAPY: CPT | Mod: GP | Performed by: PHYSICAL THERAPIST

## 2020-06-19 PROCEDURE — 36415 COLL VENOUS BLD VENIPUNCTURE: CPT | Performed by: PHYSICIAN ASSISTANT

## 2020-06-19 PROCEDURE — 25000128 H RX IP 250 OP 636: Performed by: PHYSICIAN ASSISTANT

## 2020-06-19 PROCEDURE — 25000132 ZZH RX MED GY IP 250 OP 250 PS 637: Performed by: PHYSICIAN ASSISTANT

## 2020-06-19 PROCEDURE — 85018 HEMOGLOBIN: CPT | Performed by: PHYSICIAN ASSISTANT

## 2020-06-19 PROCEDURE — 99213 OFFICE O/P EST LOW 20 MIN: CPT | Performed by: INTERNAL MEDICINE

## 2020-06-19 PROCEDURE — 97530 THERAPEUTIC ACTIVITIES: CPT | Mod: GP | Performed by: PHYSICAL THERAPIST

## 2020-06-19 RX ORDER — METHOCARBAMOL 500 MG/1
500 TABLET, FILM COATED ORAL 4 TIMES DAILY PRN
Qty: 30 TABLET | Refills: 0 | Status: SHIPPED | OUTPATIENT
Start: 2020-06-19

## 2020-06-19 RX ADMIN — ACETAMINOPHEN 975 MG: 325 TABLET, FILM COATED ORAL at 11:22

## 2020-06-19 RX ADMIN — ASPIRIN 81 MG: 81 TABLET ORAL at 07:57

## 2020-06-19 RX ADMIN — METHOCARBAMOL 500 MG: 500 TABLET, FILM COATED ORAL at 11:22

## 2020-06-19 RX ADMIN — METHOCARBAMOL 500 MG: 500 TABLET, FILM COATED ORAL at 05:42

## 2020-06-19 RX ADMIN — DOCUSATE SODIUM 50MG AND SENNOSIDES 8.6MG 1 TABLET: 8.6; 5 TABLET, FILM COATED ORAL at 07:58

## 2020-06-19 RX ADMIN — ACETAMINOPHEN 975 MG: 325 TABLET, FILM COATED ORAL at 05:41

## 2020-06-19 RX ADMIN — CEFAZOLIN 1 G: 1 INJECTION, POWDER, FOR SOLUTION INTRAMUSCULAR; INTRAVENOUS at 00:25

## 2020-06-19 NOTE — PROGRESS NOTES
[facility]    Medicine Progress Note - Hospitalist Service       Date of Admission:  6/18/2020  Assessment & Plan      Tyson Fregoso is a 70 year old male admitted on 8/18 after hip surgery      # S/P Left MAXIME   .   . Preop Hb 14.5  Analgesia: per ortho   DVT prophylaxis:- per ortho   Abx and wound care as per primary team.   Intensive spirometry to prevent atelectasis      # no history of DM or Hypertension or CAD     # Smoking history ; remote         Diet: Advance Diet as Tolerated: Regular Diet Adult  Diet    DVT Prophylaxis: Defer to primary service ( asa 81 mg BID )   Avila Catheter: not present  Code Status: Full Code           Disposition Plan   Expected discharge: per ortho, recommended to prior living arrangement once pain controlled and clears PT.  Entered: Roxi Miramontes MD 06/19/2020, 7:18 AM       The patient's care was discussed with the Bedside Nurse, Care Coordinator/, Patient and Primary team.    Rxoi Miramontes MD  Hospitalist Service  [facility]    ______________________________________________________________________    Interval History   No new issues  No cp   No spb   No fever   No chills  No dizzy ness    Data reviewed today: I reviewed all medications, new labs and imaging results over the last 24 hours.   Physical Exam   Vital Signs: Temp: 97.5  F (36.4  C) Temp src: Oral BP: 135/66 Pulse: 61 Heart Rate: 55 Resp: 16 SpO2: 97 % O2 Device: None (Room air) Oxygen Delivery: 6 LPM  Constitutional: awake, alert, cooperative, no apparent distress, and appears stated age  ENT: Normocephalic, without obvious abnormality, atraumatic, sinuses nontender on palpation, external ears without lesions, oral pharynx with moist mucous membranes, tonsils without erythema or exudates, gums normal and good dentition.  Hematologic / Lymphatic: no cervical lymphadenopathy  Respiratory: No increased work of  breathing, good air exchange, clear to auscultation bilaterally, no crackles or wheezing  Cardiovascular: Normal apical impulse, regular rate and rhythm, normal S1 and S2, no S3 or S4, and no murmur noted  GI: No scars, normal bowel sounds, soft, non-distended, non-tender, no masses palpated, no hepatosplenomegally    Data   Recent Labs   Lab 06/19/20  0728 06/18/20  0625   HGB 11.0*  --    GLC  --  102*

## 2020-06-19 NOTE — PLAN OF CARE
Discharge Planner OT   Patient plan for discharge: Home with assist  Current status: Patient met all OT goals; AE needs met.    Barriers to return to prior living situation: None  Recommendations for discharge: Home with assist  Rationale for recommendations: No further inpatient or home OT needs    Occupational Therapy Discharge Summary    Reason for therapy discharge:    All goals and outcomes met, no further needs identified.    Progress towards therapy goal(s). See goals on Care Plan in [facility] electronic health record for goal details.  Goals met    Therapy recommendation(s):    No further therapy is recommended.           Entered by: Freya Sharp 06/19/2020 9:11 AM

## 2020-06-19 NOTE — PLAN OF CARE
"Discharge Planner PT   Patient plan for discharge: Home with assist  Current status: 50% WB, SBA for bed mobility and transfers, SBA for ambulation with FWW x 300ft. Negotiates sairyt4q's using hand rails. Review of HEP for seated and supine exercises. Pt education on car transfers.  Barriers to return to prior living situation: none  Recommendations for discharge: Home   Rationale for recommendations: Pt is near baseline. Discussed OP PT - Pt declined stated he has the exercises and should be \"ok\".      Physical Therapy Discharge Summary    Reason for therapy discharge:    Discharged to home.    Progress towards therapy goal(s). See goals on Care Plan in [facility] electronic health record for goal details.  Goals met    Therapy recommendation(s):    Continued therapy is recommended.  Rationale/Recommendations:  Pt can progress hip strength with OP therapy program - Pt declined OP PT.           Entered by: Delvin Espinosa 06/19/2020 11:11 AM       "

## 2020-06-19 NOTE — PROGRESS NOTES
Care Coordinator Progress Note    Admission Date/Time:  6/18/2020  Attending MD:  Logan Waters MD    Data  Chart reviewed, discussed with interdisciplinary team.   Patient was admitted for:    Primary localized osteoarthritis of left hip  Primary localized osteoarthritis of left hip  Status post knee surgery.     Assessment  Per chart review patient declined any home care or outpatient therapy at this time. RNCC available as needed.     Plan  Anticipated Discharge Date:  6/19/2020  Anticipated Discharge Plan:  Home    Miriam Rolon RN, BSN  Care Coordinator, 8A  Phone [phone]  Pager [phone]

## 2020-06-19 NOTE — PLAN OF CARE
[facility]      OUTPATIENT OCCUPATIONAL THERAPY  EVALUATION  PLAN OF TREATMENT FOR OUTPATIENT REHABILITATION  (COMPLETE FOR INITIAL CLAIMS ONLY)  Patient's Last Name, First Name, M.I.  YOB: 1950  Tyson Fregoso                          Provider's Name  [facility] Medical Record No.  [id number]                               Onset Date:  06/18/20   Start of Care Date:  06/19/20     Type:     ___PT   _X_OT   ___SLP Medical Diagnosis:  L MAXIME                        OT Diagnosis:  impaired self-cares/mobility   Visits from SOC:  1   _________________________________________________________________________________  Plan of Treatment/Functional Goals    Planned Interventions: ADL retraining,       Goals: See Occupational Therapy Goals on Care Plan in [employer] electronic health record.    Therapy Frequency: Daily  Predicted Duration of Therapy Intervention: 1 day  _________________________________________________________________________________    I CERTIFY THE NEED FOR THESE SERVICES FURNISHED UNDER        THIS PLAN OF TREATMENT AND WHILE UNDER MY CARE     (Physician co-signature of this document indicates review and certification of the therapy plan).              Certification date from: 06/19/20, Certification date to: 06/19/20    Referring Physician: Dr. Waters            Initial Assessment        See Occupational Therapy evaluation dated 06/19/20 in Epic electronic health record.

## 2020-06-19 NOTE — PROGRESS NOTES
A/Ox's 4. Pt rated pain as tolerable. Tylenol, Robaxin and Ice packs given for pain control. Dressing CDI. CMS intact. Tolerated regular diet. Denied any nausea, CP, SOB, lightheadedness or dizziness. Voiding without pain or difficulty. Last PVR 0ML.  Passing flatus. Up with assist of one. Resting in bed at this time with call light in reach. Able to make needs known. Continue to monitor.

## 2020-06-19 NOTE — PLAN OF CARE
VS: Stable    O2: >90% on RA    Output: Voiding adequate amts with PVR ,50ml   Last BM: 6/18   Activity: Ambulates well with SBA    Skin: WDl with exception to surgical incision.    Pain: Well controlled with Tylenol. Declines oxycodone.    CMS: Intact    Dressing: CDI    Diet: Tolerating regular diet .    LDA: PIV removed   Equipment: FWW    Plan: Discharge home.     Pt. discharged at 1330 to home. Pt. was accompanied by wife , and left with personal belongings. Prior to discharge, PIV was removed. Pt. received complete discharge paperwork and  medications as filled by discharge pharmacy. Pt. was given times of last dose for all discharge medications in writing on discharge medication sheets.  Discharge teaching included medication, pain management, activity restrictions, dressing changes, and signs and symptoms of infection. Pt. given dates and times of follow up appointments. Pt. had no further questions at the time of discharge and no unmet needs were identified.

## 2020-06-19 NOTE — PROGRESS NOTES
06/19/20 0900   Quick Adds   Type of Visit Initial Occupational Therapy Evaluation   Living Environment   Lives With spouse   Living Arrangements house   Transportation Anticipated car, drives self;family or friend will provide   Living Environment Comment Has RTS, walk-in shower, shower chair   Self-Care   Usual Activity Tolerance good   Current Activity Tolerance moderate   Equipment Currently Used at Home none   Activity/Exercise/Self-Care Comment Patient independent in ADLs/mobility without AE   Functional Level   Ambulation 0-->independent   Transferring 0-->independent   Toileting 0-->independent   Bathing 0-->independent   Dressing 0-->independent   Eating 0-->independent   Communication 0-->understands/communicates without difficulty   Swallowing 0-->swallows foods/liquids without difficulty   Cognition 0 - no cognition issues reported   Fall history within last six months no   Prior Functional Level Comment Patient independent in ADLs/mobility without AE   General Information   Onset of Illness/Injury or Date of Surgery - Date 06/18/20   Referring Physician Dr. Waters   Patient/Family Goals Statement return home to baseline   Additional Occupational Profile Info/Pertinent History of Current Problem POD #1 s/p L MAXIME   Precautions/Limitations left hip precautions   Weight-Bearing Status - LLE partial weight-bearing (% in comments)  (50)   General Observations On RA, alert   Cognitive Status Examination   Cognitive Comment No cognitive concerns   Pain Assessment   Patient Currently in Pain Yes, see Vital Sign flowsheet   Mobility   Bed Mobility Bed mobility skill: Sit to supine   Bed Mobility Skill: Sit to Supine   Level of [facility]: Sit/Supine minimum assist (75% patients effort)   Transfer Skill: Bed to Chair/Chair to Bed   Level of [facility]: Bed to Chair stand-by assist   Weight-Bearing Restrictions partial weight-bearing  (50%)   Assistive Device - Transfer Skill Bed to Chair Chair to Bed Rehab  "Eval standard walker   Transfer Skill: Sit to Stand   Level of [place]: Sit/Stand stand-by assist   Transfer Skill: Sit to Stand partial weight-bearing  (50%)   Assistive Device for Transfer: Sit/Stand standard walker   Transfer Skill: Toilet Transfer   Level of [place]: Toilet stand-by assist   Weight-Bearing Restrictions: Toilet partial weight-bearing  (50%)   Assistive Device standard walker   Upper Body Dressing   Level of [place]: Dress Upper Body independent   Lower Body Dressing   Level of [place]: Dress Lower Body stand-by assist   Assistive Device long-handled shoe horn;reacher;sock-aid   Toileting   Level of [place]: Toilet stand-by assist   Grooming   Level of [place]: Grooming independent   Eating/Self Feeding   Level of [place]: Eating independent   Activities of Daily Living Analysis   Impairments Contributing to Impaired Activities of Daily Living pain;post surgical precautions;ROM decreased   General Therapy Interventions   Planned Therapy Interventions ADL retraining   Clinical Impression   Criteria for Skilled Therapeutic Interventions Met yes, treatment indicated   OT Diagnosis impaired self-cares/mobility   Influenced by the following impairments pain; decreased ROM   Assessment of Occupational Performance 1-3 Performance Deficits   Identified Performance Deficits dressing, bathing, toileting   Clinical Decision Making (Complexity) Low complexity   Therapy Frequency Daily   Predicted Duration of Therapy Intervention (days/wks) 1 day   Anticipated Discharge Disposition Home with Assist   Risks and Benefits of Treatment have been explained. Yes   Patient, Family & other staff in agreement with plan of care Yes   [facility] TM \"6 Clicks\"   2016, Trustees of [facility], under license to [employer].  All rights reserved.   6 Clicks Short Forms Daily Activity Inpatient Short Form   [facility]  \"6 Clicks\" Daily Activity Inpatient " Short Form   1. Putting on and taking off regular lower body clothing? 4 - None   2. Bathing (including washing, rinsing, drying)? 4 - None   3. Toileting, which includes using toilet, bedpan or urinal? 4 - None   4. Putting on and taking off regular upper body clothing? 4 - None   5. Taking care of personal grooming such as brushing teeth? 4 - None   6. Eating meals? 4 - None   Daily Activity Raw Score (Score out of 24.Lower scores equate to lower levels of function) 24   Total Evaluation Time   Total Evaluation Time (Minutes) 8

## 2020-06-20 NOTE — DISCHARGE SUMMARY
ORTHOPAEDIC DISCHARGE SUMMARY     Date of Admission: 6/18/2020  Date of Discharge: 6/19/2020  2:19 PM  Disposition: Home  Staff Physician: No att. providers found  Primary Care Provider: Akbar Pa    DISCHARGE DIAGNOSIS:  Primary localized osteoarthritis of left hip [M16.12]    PROCEDURES: Procedure(s):  ARTHROPLASTY, HIP, TOTAL LEFT on 6/18/2020    HOSPITAL COURSE:    Surgery was successful with a controlled and fixed femoral medial calcar fracture sustained intraop. Two cables were placed successfully. As a result, the weight bearing was downgraded to 50% for the operative limb for protection post op x 2 weeks.. Tyson Fregoso has done well post-operatively in adjusting to these limitations. Medicine was consulted post operatively to aid in management of medical comorbidities.  The patient received routine nursing cares and has remained medically stable. Vital signs have remained stable throughout the hospital stay. The patient is tolerating a regular diet without GI distress/nausea or vomiting. Voiding spontaneously in the post-operative period. PT & OT were consulted for evaluation, and all PT/OT goals have been met for safe mobility. The patient's post-operative pain is now controlled on oral medications, which will be available on discharge. Stool softeners have been used while taking pain medications to help prevent constipation. Tyson Fregoso is deemed medically safe to discharge.     Antibiotics:  ancef given periop and 24 hours postop.  DVT prophylaxis:  Asa 81mg bid  PT Progress:  Has met PT/OT goals for safe mobility.    Pain Meds:  Weaned off all IV pain meds by discharge.  Inpatient Events: No significant events or complications.     Discharge orders and instructions as below.    FOLLOWUP:    Follow up with Dr. Waters's clinic at 2 and 6 weeks postoperatively.  Future Appointments   Date Time Provider Department Center   7/1/2020  1:30 PM Logan Waters MD [facility]    7/29/2020 12:45 PM Logan Waters MD [facility] [place] ([address] [place] [id number]). Call [phone] to schedule a follow-up appointment at this location with your provider if you have not heard confirmation of your appointment in the next 3-5 business days    PLANNED DISCHARGE ORDERS:           Discharge Medication List as of 6/19/2020 12:38 PM      START taking these medications    Details   methocarbamol (ROBAXIN) 500 MG tablet Take 1 tablet (500 mg) by mouth 4 times daily as needed for muscle spasms, Disp-30 tablet,R-0, E-Prescribe      oxyCODONE (ROXICODONE) 5 MG tablet Take 1-2 tablets (5-10 mg) by mouth every 3 hours as needed, Disp-30 tablet,R-0, E-Prescribe      polyethylene glycol (MIRALAX) 17 g packet Take 17 g by mouth daily, Disp-7 packet,R-0, E-Prescribe         CONTINUE these medications which have CHANGED    Details   acetaminophen (TYLENOL) 325 MG tablet Take 2 tablets (650 mg) by mouth every 4 hours as needed for other, Disp-1 Bottle,R-0, E-Prescribe      aspirin (ASA) 81 MG EC tablet Take 1 tablet (81 mg) by mouth 2 times daily, Disp-84 tablet,R-0, E-Prescribe      senna-docusate (SENOKOT-S/PERICOLACE) 8.6-50 MG tablet Take 2 tablets by mouth 2 times daily, Disp-30 tablet,R-0, E-Prescribe         CONTINUE these medications which have NOT CHANGED    Details   glucosamine-chondroitin 500-400 MG CAPS per capsule Take 1 capsule by mouth daily (with lunch), Historical      multivitamin w/minerals (MULTI-VITAMIN) tablet Take 1 tablet by mouth every morning, Historical         STOP taking these medications       gabapentin (NEURONTIN) 300 MG capsule Comments:   Reason for Stopping:         HYDROcodone-acetaminophen (NORCO) 5-325 MG tablet Comments:   Reason for Stopping:         naproxen sodium 220 MG capsule Comments:   Reason for Stopping:                 Discharge Procedure Orders   Reason for your hospital stay   Order Comments: You were  admitted following your total hip arthroplasty     Discharge Instructions   Order Comments: TOTAL HIP ARTHROPLASTY POST OPERATIVE DISCHARGE INSTRUCTIONS    FOLLOW UP APPOINTMENT  You are scheduled for a post operative wound check with Dr. Waters's clinic approximately two weeks after surgery. At approximately six weeks after surgery, you will see Dr. Waters in clinic. During this visit, repeat X rays of your operative hip will be performed.      Your follow up appointments will be at the location that you regularly see Dr. Waters:    [facility]  [address] [place] [address] [phone]    [facility]  [address] [place] [address] [phone]    Physical therapy:   A prescription for physical therapy will be provided at the time of discharge.       ACTIVITY  Weight bearing status:   You may bear weight on your operative extremity as tolerated, using assistive devices (walker, cane) as needed. As you begin to feel more comfortable ambulating, you may gradually transition from a walker to a cane. Eventually, you should wean from all assistive devices. Although we would like you to discontinue use of assistive devices as soon as possible, do not transition until you have worked with your physical therapist to achieve safe balance and comfort.     Posterior hip precautions:  You must maintain the following posterior hip precautions for the next 12 weeks with no exceptions:  1) no hip flexion >90 degrees (no bending down at the waist)  2) no internal rotation past neutral (no pivoting)  3) no adduction past midline (no crossing your legs)    Exercises:   Perform the following exercises at least three times per day for the first four weeks after surgery to prevent complications, such as blood clots in your legs:  1) Point and flex your feet  2) Move your ankle around in big circles  3) Wiggle your toes   Also, perform thigh muscle  tightening exercises for 10 to 15 minutes at least three times per day for the first four weeks after surgery.    Athletic Activities:  Activities such as swimming, bicycling, jogging, running, and stop-and-go sports should be avoided until permitted by your provider.    Driving:  Driving is not permitted until directed by your provider. Under no circumstance are you permitted to drive while using narcotic pain medications.    Return to Work:  You may return to work when directed by your provider.       COMFORT AND PAIN MANAGEMENT  Swelling:  Some swelling in the leg is normal after surgery.  This type of swelling is usually gravity dependent and is improved in the morning after sleep. If you begin to experience worsening swelling or calf pain, please contact Dr. Waters's office. We may recommend presenting to the Emergency Department to obtain an ultrasound of the leg if there is concern for a DVT.       Icing:  An ice pack will be provided to control swelling and discomfort after surgery. Place a thin towel on your skin and apply the ice pack overtop. You may apply ice for 20 minutes as often as two times per hour.    Pain Medications:  You will be discharged with acetaminophen (Tylenol) and a narcotic medication for pain management after surgery. Acetaminophen is most effective when it is taken per the schedule outlined by your provider (every four, six, or eight hours as prescribed). You may safely use acetaminophen as prescribed for the first four weeks after surgery provided you do not exceed the maximum daily dose prescribed by your provider (usually 3000 mg - 4000 mg). The narcotic pain medication should only be taken on an as-needed basis when necessary and should be reserved for severe pain that is not controlled with scheduled acetaminophen. In the first three days following surgery, your symptoms may warrant use of the narcotic pain medication every three, four, or six hours as prescribed. After three  days, focus your efforts on decreasing (tapering) use of narcotic medications.   The most successful tapering strategy is to first, decrease the dose (number of tablets) and second, decrease the interval (time between doses). For example, if you begin taking two tablets every four hours after surgery, start your taper by decreasing one of these doses to one tablet. Every one to two days, decrease another dose to one tablet until you are eventually taking one tablet every four hours. Once this is achieved, focus on increasing the number of hours between doses, moving from one tablet every four hours to one tablet every six hours. As tolerated, continue to increase the interval to eight and twelve hours. Eventually, taper to one dose every evening and discontinue when no longer needed.      ANTICOAGULATION  Take the ASA 81 mg BID  prescribed for a total of 6  weeks after surgery.  This is given to help minimize your risk of blood clot.      WOUND CARE AND SHOWERING  Wound care:  You have a clean Aquacel dressing on your surgical wound. This dressing should stay in place for seven days to allow the incision to heal. If the Aquacel dressing becomes excessively wet or saturated before removal on day seven, please contact Dr. Waters's office. After seven days, remove the dressing and leave the wound open to air (see showering instructions below). If there is any drainage from the incision after removal of the Aquacel, dress the wound with sterile 4x4 gauze, using tape over top to secure the dressing in place over the incision. Please contact Dr. Waters's office if you notice the following after removal of the Aquacel dressin) significant cloudy, bloody, or malodorous drainage from the incision, 2) excessive warmth and redness around the incision.    Showering:  You may shower 48 hours after surgery provided the Aquacel dressing is in place. Although you are strictly prohibited from soaking or submerging the surgical  wound underwater, you may shower in the usual fashion, allowing water and soap to run over the Aquacel. Once the Aquacel is removed on the seventh day after surgery, you may continue to shower; however, the incision should be covered with saran wrap (or any other non-permeable covering) to allow the incision to remain dry and to prevent you from scrubbing/rubbing the incision. The Dermabond (surgical adhesive that is directly on the incision areas) should be left on until it falls off or is removed at your first office visit. After your incision is examined at your first office visit, you will likely be allowed to return to showering without covering the incision.     Tub Bathing:  Tub bathing, swimming, or any other activities that cause your incision to be submerged should be avoided until allowed by your provider. Typically, patients are allowed to return to these activities six weeks after surgery.      CONTACTING YOUR PHYSICIAN:  You may experience symptoms that require follow-up before your scheduled two week appointment. Please contact Dr. Waters's office if you experience:  1) Pain that persists or worsens in the first few days after surgery  2) Excessive redness or drainage of cloudy or bloody material from the wounds (clear red tinted fluid and some mild drainage should be expected) or drainage of any kind five days after surgery  3) A temperature elevation greater than 101.5    4) Pain, swelling or redness in your calf  5) Numbness or weakness in your leg or foot      Regular business hours (Monday - Friday, 8am - 5pm):  [facility]: [phone]  [facility]: [phone]    After hours and weekends:  [facility] on call Orthopedic resident: [phone]     Diet   Order Comments: Return to your pre surgical diet     Order Specific Question Answer Comments   Is discharge order? Yes        Tree Morgan MD  Orthopaedic Surgery  Resident

## 2020-06-22 ENCOUNTER — TELEPHONE (OUTPATIENT)
Dept: ORTHOPEDICS | Facility: CLINIC | Age: 70
End: 2020-06-22

## 2020-06-22 NOTE — TELEPHONE ENCOUNTER
[facility] Call Center    Phone Message    May a detailed message be left on voicemail: yes     Reason for Call: Other: Pt would like a call back to discuss the medicaiton he can take for pain . PLease reach out to pt to discuss     Action Taken: Message routed to:  [facility] ([facility] Ortho    Travel Screening: Not Applicable

## 2020-06-22 NOTE — TELEPHONE ENCOUNTER
Returned call to patient. Patient states he has stopped oxycodone because of the way it makes him feel but is still having pain. He wants to stop the aspirin for DVT prophylaxis so he can take Aleve for pain. Per Dr. Waters, patient needs to remain on the aspirin but he can take Aleve as long as he is not taking any other NSAID. Patient expressed understanding and states he does not take any other NSAIDs.

## 2020-06-25 DIAGNOSIS — M16.12 PRIMARY LOCALIZED OSTEOARTHRITIS OF LEFT HIP: Primary | ICD-10-CM

## 2020-07-01 ENCOUNTER — OFFICE VISIT (OUTPATIENT)
Dept: ORTHOPEDICS | Facility: CLINIC | Age: 70
End: 2020-07-01
Payer: COMMERCIAL

## 2020-07-01 ENCOUNTER — ANCILLARY PROCEDURE (OUTPATIENT)
Dept: GENERAL RADIOLOGY | Facility: CLINIC | Age: 70
End: 2020-07-01
Attending: ORTHOPAEDIC SURGERY
Payer: COMMERCIAL

## 2020-07-01 DIAGNOSIS — M16.12 PRIMARY LOCALIZED OSTEOARTHRITIS OF LEFT HIP: ICD-10-CM

## 2020-07-01 DIAGNOSIS — M16.12 PRIMARY LOCALIZED OSTEOARTHRITIS OF LEFT HIP: Primary | ICD-10-CM

## 2020-07-01 RX ORDER — NAPROXEN SODIUM 220 MG
220 TABLET ORAL
COMMUNITY
End: 2020-09-14

## 2020-07-01 NOTE — PROGRESS NOTES
Interval History:   Tyson Fregoso is a 70 year old male who is here follow up for:   Left MAXIME - 6/18/2020    Rubén returns for follow-up following the left total hip replacement and cabling for a calcar fracture.  He has done very well over the last few weeks.  He notes his pain has been steadily improving.  The swelling in his leg has also been proving.  He has not had any problems with his incision.  All in all he feels like he is making good progress with the hip replacement.  He has been using the walker for the partial weightbearing due to the calcar fracture.  He is taking Tylenol and anti-inflammatory medication for pain control.         Physical Exam:     NAD  AOx3  Interactive and cooperative with the exam.  His incision is well-healed.  He has mild diffuse swelling throughout the leg.  Ankle plantar flexion dorsiflexion is intact.  He walks with a well-balanced gait with use of a walker.  He has no pain with gentle left hip range of motion.         Imaging:      Repeat imaging demonstrates maintained hardware and implant alignment and symmetric leg lengths.       Assessment and Plan:        Tyson Fregoso is a 70 year old male is s/p the above procedure.  Rubén is doing well 2 weeks out from surgery.  At this point he can begin to progress with weightbearing as tolerated.  I would expect his swelling and stiffness and symptoms to continue to steadily improve.  I will see him back in clinic in 4 weeks.  He will let me know if he has any questions or concerns in the meantime.      Logan Waters M.D.     Arthritis and Joint Replacement  Department of Orthopaedic Surgery, [facility]  [email or web address]  [phone] (pager)

## 2020-07-01 NOTE — NURSING NOTE
Reason For Visit:   Chief Complaint   Patient presents with     Surgical Followup     Left MAXIME on 6/18/20       Primary MD: Akbar Pa  Referring MD: Logan Waters          There were no vitals taken for this visit.    Pain Assessment  Patient Currently in Pain: Yes  0-10 Pain Scale: 3  Primary Pain Location: Knee  Pain Descriptors: Discomfort    Current Outpatient Medications   Medication     acetaminophen (TYLENOL) 325 MG tablet     aspirin (ASA) 81 MG EC tablet     glucosamine-chondroitin 500-400 MG CAPS per capsule     methocarbamol (ROBAXIN) 500 MG tablet     multivitamin w/minerals (MULTI-VITAMIN) tablet     naproxen sodium (ANAPROX) 220 MG tablet     oxyCODONE (ROXICODONE) 5 MG tablet     polyethylene glycol (MIRALAX) 17 g packet     senna-docusate (SENOKOT-S/PERICOLACE) 8.6-50 MG tablet     No current facility-administered medications for this visit.         No Known Allergies        Haley Lara LPN

## 2020-07-01 NOTE — LETTER
7/1/2020         RE: Tyson Fregoso  [address]  [place] [address]        Dear Colleague,    Thank you for referring your patient, Tyson Fregoso, to the [facility]. Please see a copy of my visit note below.           Interval History:   Tyson Fregoso is a 70 year old male who is here follow up for:   Left MAXIME - 6/18/2020    Rubén returns for follow-up following the left total hip replacement and cabling for a calcar fracture.  He has done very well over the last few weeks.  He notes his pain has been steadily improving.  The swelling in his leg has also been proving.  He has not had any problems with his incision.  All in all he feels like he is making good progress with the hip replacement.  He has been using the walker for the partial weightbearing due to the calcar fracture.  He is taking Tylenol and anti-inflammatory medication for pain control.         Physical Exam:     NAD  AOx3  Interactive and cooperative with the exam.  His incision is well-healed.  He has mild diffuse swelling throughout the leg.  Ankle plantar flexion dorsiflexion is intact.  He walks with a well-balanced gait with use of a walker.  He has no pain with gentle left hip range of motion.         Imaging:      Repeat imaging demonstrates maintained hardware and implant alignment and symmetric leg lengths.       Assessment and Plan:        Tyson Fregoso is a 70 year old male is s/p the above procedure.  Rubén is doing well 2 weeks out from surgery.  At this point he can begin to progress with weightbearing as tolerated.  I would expect his swelling and stiffness and symptoms to continue to steadily improve.  I will see him back in clinic in 4 weeks.  He will let me know if he has any questions or concerns in the meantime.      Logan Waters M.D.     Arthritis and Joint Replacement  Department of Orthopaedic Surgery, [facility]  [email or web address]  [phone] (pager)

## 2020-07-29 ENCOUNTER — ANCILLARY PROCEDURE (OUTPATIENT)
Dept: GENERAL RADIOLOGY | Facility: CLINIC | Age: 70
End: 2020-07-29
Attending: ORTHOPAEDIC SURGERY
Payer: COMMERCIAL

## 2020-07-29 ENCOUNTER — OFFICE VISIT (OUTPATIENT)
Dept: ORTHOPEDICS | Facility: CLINIC | Age: 70
End: 2020-07-29
Payer: COMMERCIAL

## 2020-07-29 DIAGNOSIS — M16.12 PRIMARY LOCALIZED OSTEOARTHRITIS OF LEFT HIP: ICD-10-CM

## 2020-07-29 DIAGNOSIS — M16.12 PRIMARY LOCALIZED OSTEOARTHRITIS OF LEFT HIP: Primary | ICD-10-CM

## 2020-07-29 NOTE — LETTER
7/29/2020         RE: Tyson Fregoso  [address]  [place] [address]        Dear Colleague,    Thank you for referring your patient, Tyson Fregoso, to the [facility]. Please see a copy of my visit note below.           Interval History:   Tyson Fregoso is a 70 year old male who is here follow up for:   Left MAXIME - 6/18/2020    Rubén returns today for follow-up following the left total hip replacement.  He notes that things have progressed well over the last month.  His pain, mobility has been steadily improving.  He does continue to have some difficulty sleeping at night.  Aside from this he is doing very well and happy with the outcome thus far.         Physical Exam:     NAD  AOx3  Interactive and cooperative with the exam.  His incision is well-healed.  Ankle plantar flexion dorsiflexion is intact.  He walks with a well-balanced gait with out any assistive devices.  He has no pain with gentle left hip range of motion.         Imaging:      Repeat imaging demonstrates maintained hardware and implant alignment and symmetric leg lengths.       Assessment and Plan:        Tyson Fregoso is a 70 year old male is s/p the above procedure.  Rubén is making excellent progress.  There is no change in position of his implants.  Things are steadily improving.  He continues to continue to progress with activities and exercises as tolerated.  He is no longer need to follow any specific precautions.  I will see him back in clinic in 6 weeks if he has any ongoing issues.  He will let us know if he has any questions or concerns in the meantime.      Logan Waters M.D.     Arthritis and Joint Replacement  Department of Orthopaedic Surgery, [facility]  [email or web address]  [phone] (pager)

## 2020-07-29 NOTE — NURSING NOTE
Reason For Visit:   Chief Complaint   Patient presents with     Surgical Followup     DOS 6/18/20 Left MAXIME        Primary MD: Akbar Pa  Referring MD: Logan Waters        Pain Assessment  Patient Currently in Pain: Yes  0-10 Pain Scale: 2  Pain Descriptors: Discomfort, Nagging, Numbness    Current Outpatient Medications   Medication     naproxen sodium (ANAPROX) 220 MG tablet     acetaminophen (TYLENOL) 325 MG tablet     aspirin (ASA) 81 MG EC tablet     glucosamine-chondroitin 500-400 MG CAPS per capsule     methocarbamol (ROBAXIN) 500 MG tablet     multivitamin w/minerals (MULTI-VITAMIN) tablet     oxyCODONE (ROXICODONE) 5 MG tablet     polyethylene glycol (MIRALAX) 17 g packet     senna-docusate (SENOKOT-S/PERICOLACE) 8.6-50 MG tablet     No current facility-administered medications for this visit.         No Known Allergies        Haley Lara LPN

## 2020-08-12 ENCOUNTER — TELEPHONE (OUTPATIENT)
Dept: ORTHOPEDICS | Facility: CLINIC | Age: 70
End: 2020-08-12

## 2020-08-12 DIAGNOSIS — M79.605 LEFT LEG PAIN: Primary | ICD-10-CM

## 2020-08-12 NOTE — TELEPHONE ENCOUNTER
[facility] Call Center    Phone Message    May a detailed message be left on voicemail: yes     Reason for Call: Other: D.O.S. 6/18/2020, Dr. Waters, Left Hip Surgery, Pt. reports pain, numbness in toes which Pt. states developped a week before last visit with Dr. Waters, Pt. states currently taking 3 Aleve per day and 2 Tylenol, last 2 days pain has worsended, Pt.states he does not think he is running a fever. Please call Pt. back on cell at [phone].     Action Taken: Message routed to:  [facility] ([facility] Ortho     Travel Screening: Not Applicable

## 2020-08-12 NOTE — TELEPHONE ENCOUNTER
Patient was called back and after consulting with Dr. Waters he suggested to have the patient see sports medicine clinic to have a work up completed on his back and see if the toe numbness and leg pain is contributed to his back.  He as agreeable with this plan.  He will wait for the call to schedule.

## 2020-08-13 ENCOUNTER — OFFICE VISIT (OUTPATIENT)
Dept: NEUROSURGERY | Facility: CLINIC | Age: 70
End: 2020-08-13
Payer: COMMERCIAL

## 2020-08-13 VITALS
HEART RATE: 59 BPM | RESPIRATION RATE: 16 BRPM | WEIGHT: 174 LBS | SYSTOLIC BLOOD PRESSURE: 186 MMHG | BODY MASS INDEX: 23.57 KG/M2 | DIASTOLIC BLOOD PRESSURE: 97 MMHG | HEIGHT: 72 IN | OXYGEN SATURATION: 98 %

## 2020-08-13 DIAGNOSIS — M79.605 LEFT LEG PAIN: ICD-10-CM

## 2020-08-13 DIAGNOSIS — M54.16 LUMBAR RADICULOPATHY: Primary | ICD-10-CM

## 2020-08-13 DIAGNOSIS — Z98.890 S/P LUMBAR MICRODISCECTOMY: ICD-10-CM

## 2020-08-13 PROCEDURE — 99204 OFFICE O/P NEW MOD 45 MIN: CPT | Performed by: PHYSICIAN ASSISTANT

## 2020-08-13 RX ORDER — METHYLPREDNISOLONE 4 MG
TABLET, DOSE PACK ORAL
Qty: 21 TABLET | Refills: 0 | Status: ON HOLD | OUTPATIENT
Start: 2020-08-13 | End: 2020-08-24

## 2020-08-13 ASSESSMENT — MIFFLIN-ST. JEOR: SCORE: 1577.26

## 2020-08-13 ASSESSMENT — PAIN SCALES - GENERAL: PAINLEVEL: WORST PAIN (10)

## 2020-08-13 NOTE — PROGRESS NOTES
Dr. Rai Domínguez  [facility] Clinic Visit      CC: Back pain and left toe numbness    Primary care Provider: Akbar Pa      Reason For Visit:   I was asked by Logan Waters MD  to consult on the patient for left leg pain.      HPI: Tyson Fregoso is a 70 year old male with prior L3-4 discectomy and left L4-5 hemilaminectomy in 1/20 with Dr. Ward who presents for evaluation of left low back and left leg pain x 3 weeks. Pain is located in left low back and radiates radiates down posterolateral left leg to the lateral shin and into 1st-3rd toes. Describes the pain as a deep achy sensation. He has numbness in the 1st-3rd toes. Pain is worsened with mowing the lawn. Cannot recall anything that alleviates the pain. Has been taking aleve and tylenol without relief. Underwent left MAXIME on 6/18 with good relief. No recent imaging, PT, or injections. Denies bladder/bowel incontinence.    Current pain: 10/10       Past Medical History reviewed with patient during visit.    Past Surgical History:   Procedure Laterality Date     ARTHROPLASTY HIP Left 6/18/2020    Procedure: ARTHROPLASTY, HIP, TOTAL LEFT;  Surgeon: Logan Waters MD;  [facility]: [facility] OR     LAMINECTOMY LUMBAR TWO LEVELS N/A 1/8/2020    Procedure: Lumbar 3 to 4 decompression and discectomy and left Lumbar 4-5 Hemilaminectomy;  Surgeon: Jan Ward MD;  [facility] OR     microdiscectomy  2001    L4-5     SHOULDER SURGERY Bilateral     arthroscopies (Left 2005, right 2013)     Past Surgical History reviewed with patient during visit.    Current Outpatient Medications   Medication     methylPREDNISolone (MEDROL DOSEPAK) 4 MG tablet therapy pack     multivitamin w/minerals (MULTI-VITAMIN) tablet     naproxen sodium (ANAPROX) 220 MG tablet     acetaminophen (TYLENOL) 325 MG tablet     glucosamine-chondroitin 500-400 MG CAPS per capsule     methocarbamol (ROBAXIN) 500 MG tablet     oxyCODONE  (ROXICODONE) 5 MG tablet     polyethylene glycol (MIRALAX) 17 g packet     senna-docusate (SENOKOT-S/PERICOLACE) 8.6-50 MG tablet     No current facility-administered medications for this visit.        No Known Allergies    Social History     Socioeconomic History     Marital status:      Spouse name: Not on file     Number of children: Not on file     Years of education: Not on file     Highest education level: Not on file   Occupational History     Not on file   Social Needs     Financial resource strain: Not on file     Food insecurity     Worry: Not on file     Inability: Not on file     Transportation needs     Medical: Not on file     Non-medical: Not on file   Tobacco Use     Smoking status: Former Smoker     Packs/day: 0.50     Years: 17.00     Pack years: 8.50     Last attempt to quit:      Years since quittin.6     Smokeless tobacco: Never Used   Substance and Sexual Activity     Alcohol use: Yes     Alcohol/week: 3.0 standard drinks     Types: 3 Cans of beer per week     Drinks per session: 3 or 4     Binge frequency: Daily or almost daily     Drug use: Not on file     Sexual activity: Not on file   Lifestyle     Physical activity     Days per week: Not on file     Minutes per session: Not on file     Stress: Not on file   Relationships     Social connections     Talks on phone: Not on file     Gets together: Not on file     Attends Alevism service: Not on file     Active member of club or organization: Not on file     Attends meetings of clubs or organizations: Not on file     Relationship status: Not on file     Intimate partner violence     Fear of current or ex partner: Not on file     Emotionally abused: Not on file     Physically abused: Not on file     Forced sexual activity: Not on file   Other Topics Concern     Not on file   Social History Narrative     Not on file       Family History   Problem Relation Age of Onset     Deep Vein Thrombosis Brother      Deep Vein Thrombosis  (DVT) Son      Anesthesia Reaction No family hx of      Cardiovascular No family hx of           ROS: 10 point ROS neg other than the symptoms noted above in the HPI.    Vital Signs: BP (!) 186/97   Pulse 59   Resp 16   Ht 6' (1.829 m)   Wt 174 lb (78.9 kg)   SpO2 98%   BMI 23.60 kg/m      Examination:  Constitutional:  Alert, well nourished, NAD.  HEENT: Normocephalic, atraumatic.   Pulmonary:  Without shortness of breath, normal effort.   Lymph: no lymphadenopathy to low back or LE.   Integumentary: Skin is free of rashes or lesions.   Cardiovascular:  No pitting edema of BLE.      Neurological:  Awake  Alert  Oriented x 3  Speech clear  Cranial nerves II - XII grossly intact  PERRL  EOMI  Face symmetric  Tongue midline  Motor exam   Hip Flexor:                Right: 5/5  Left:  5/5  Quadriceps:              Right:  5/5  Left:  5/5  Hamstrings:              Right:  5/5  Left:  5/5  Gastroc Soleus:        Right:  5/5  Left:  5/5  Tib/Ant:                      Right:  5/5  Left:  5/5  EHL:                          Right:  5/5  Left:  5/5       Sensation normal to bilateral lower extremities.    Reflexes are 2+ in the patellar and Achilles. There is no clonus. Downgoing Babinski.    Musculoskeletal:  Gait: Able to stand from a seated position. Antalgic gait. Ambulates with a limp.  Able to heel/toe walk without loss of balance  Lumbar examination reveals no tenderness of the spine or paraspinous muscles.  Hip height is symmetrical. Negative SI joint, sciatic notch or greater trochanteric tenderness to palpation bilaterally.  Straight leg raise is positive on the left.    Imagin views lumbar spine radiographs 2020     History: Lumbar radiculopathy      Comparison: 12/10/2019     Findings:     Standing  AP and lateral  views of the lumbar spine were obtained.     5  lumbar type vertebral bodies are assumed for the purpose of this dictation.     There is no acute osseous abnormality.  Posterior  decompression L2-L3.     There is multilevel degenerative changes of the lumbar spine, most pronounced L3-S1 with significant disc space loss. There is also lower lumbar predominant facet arthropathy. Normal AP alignment maintained.      The visualized bowel gas pattern is non-obstructive.     Vascular calcifications. Significant degenerative changes of the hips.                                                                    Impression:  1.  No acute osseous abnormality. Posterior decompression changes L2-L3.  2.  Multilevel degenerative changes most pronounced L3-S1.      CHRISTIANA HUGHES MD (Joe)    Assessment/Plan:   Tyson Fregoso is a 70 year old male with prior L3-4 discectomy and left L4-5 hemilaminectomy in 1/20 with Dr. Ward who presents for evaluation of left low back and left leg pain x 3 weeks. Pain is located in left low back and radiates radiates down posterolateral left leg to the lateral shin and into 1st-3rd toes. Describes the pain as a deep achy sensation. He has numbness in the 1st-3rd toes. Has been taking aleve and tylenol without relief. Underwent left MAXIME on 6/18 with good relief. Will obtain updated lumbar MRI and call him with results. I did send in a medrol dosepak as well. Patient voiced understanding and agreement.            Regina LEON [facility]  [address] [address]    Tel [phone]  Pager [phone]

## 2020-08-13 NOTE — NURSING NOTE
Tyson Fregoso is a 70 year old male who presents for:  Chief Complaint   Patient presents with     Consult For     back pain        Initial Vitals:  BP (!) 186/97   Pulse 59   Resp 16   Ht 6' (1.829 m)   Wt 174 lb (78.9 kg)   SpO2 98%   BMI 23.60 kg/m   Estimated body mass index is 23.6 kg/m  as calculated from the following:    Height as of this encounter: 6' (1.829 m).    Weight as of this encounter: 174 lb (78.9 kg).. Body surface area is 2 meters squared. BP completed using cuff size: regular  Worst Pain (10)    Nursing Comments: Pt present today for back pain.    Amado Arora, CMA

## 2020-08-13 NOTE — LETTER
8/13/2020         RE: Tyson Fregoso  [address]  [place] [address]        Dear Colleague,    Thank you for referring your patient, Tyson Fregoso, to the [facility]. Please see a copy of my visit note below.    Dr. Rai LEON [facility] Clinic Visit      CC: Back pain and left toe numbness    Primary care Provider: Akbar Pa      Reason For Visit:   I was asked by Logan Waters MD  to consult on the patient for left leg pain.      HPI: Tyson Fregoso is a 70 year old male with prior L3-4 discectomy and left L4-5 hemilaminectomy in 1/20 with Dr. Ward who presents for evaluation of left low back and left leg pain x 3 weeks. Pain is located in left low back and radiates radiates down posterolateral left leg to the lateral shin and into 1st-3rd toes. Describes the pain as a deep achy sensation. He has numbness in the 1st-3rd toes. Pain is worsened with mowing the lawn. Cannot recall anything that alleviates the pain. Has been taking aleve and tylenol without relief. Underwent left MAXIME on 6/18 with good relief. No recent imaging, PT, or injections. Denies bladder/bowel incontinence.    Current pain: 10/10       Past Medical History reviewed with patient during visit.    Past Surgical History:   Procedure Laterality Date     ARTHROPLASTY HIP Left 6/18/2020    Procedure: ARTHROPLASTY, HIP, TOTAL LEFT;  Surgeon: Logan Waters MD;  [facility]     LAMINECTOMY LUMBAR TWO LEVELS N/A 1/8/2020    Procedure: Lumbar 3 to 4 decompression and discectomy and left Lumbar 4-5 Hemilaminectomy;  Surgeon: Jan Ward MD;  [facility]     microdiscectomy  2001    L4-5     SHOULDER SURGERY Bilateral     arthroscopies (Left 2005, right 2013)     Past Surgical History reviewed with patient during visit.    Current Outpatient Medications   Medication     methylPREDNISolone (MEDROL DOSEPAK) 4 MG tablet therapy pack      multivitamin w/minerals (MULTI-VITAMIN) tablet     naproxen sodium (ANAPROX) 220 MG tablet     acetaminophen (TYLENOL) 325 MG tablet     glucosamine-chondroitin 500-400 MG CAPS per capsule     methocarbamol (ROBAXIN) 500 MG tablet     oxyCODONE (ROXICODONE) 5 MG tablet     polyethylene glycol (MIRALAX) 17 g packet     senna-docusate (SENOKOT-S/PERICOLACE) 8.6-50 MG tablet     No current facility-administered medications for this visit.        No Known Allergies    Social History     Socioeconomic History     Marital status:      Spouse name: Not on file     Number of children: Not on file     Years of education: Not on file     Highest education level: Not on file   Occupational History     Not on file   Social Needs     Financial resource strain: Not on file     Food insecurity     Worry: Not on file     Inability: Not on file     Transportation needs     Medical: Not on file     Non-medical: Not on file   Tobacco Use     Smoking status: Former Smoker     Packs/day: 0.50     Years: 17.00     Pack years: 8.50     Last attempt to quit:      Years since quittin.6     Smokeless tobacco: Never Used   Substance and Sexual Activity     Alcohol use: Yes     Alcohol/week: 3.0 standard drinks     Types: 3 Cans of beer per week     Drinks per session: 3 or 4     Binge frequency: Daily or almost daily     Drug use: Not on file     Sexual activity: Not on file   Lifestyle     Physical activity     Days per week: Not on file     Minutes per session: Not on file     Stress: Not on file   Relationships     Social connections     Talks on phone: Not on file     Gets together: Not on file     Attends Christian service: Not on file     Active member of club or organization: Not on file     Attends meetings of clubs or organizations: Not on file     Relationship status: Not on file     Intimate partner violence     Fear of current or ex partner: Not on file     Emotionally abused: Not on file     Physically abused:  Not on file     Forced sexual activity: Not on file   Other Topics Concern     Not on file   Social History Narrative     Not on file       Family History   Problem Relation Age of Onset     Deep Vein Thrombosis Brother      Deep Vein Thrombosis (DVT) Son      Anesthesia Reaction No family hx of      Cardiovascular No family hx of           ROS: 10 point ROS neg other than the symptoms noted above in the HPI.    Vital Signs: BP (!) 186/97   Pulse 59   Resp 16   Ht 6' (1.829 m)   Wt 174 lb (78.9 kg)   SpO2 98%   BMI 23.60 kg/m      Examination:  Constitutional:  Alert, well nourished, NAD.  HEENT: Normocephalic, atraumatic.   Pulmonary:  Without shortness of breath, normal effort.   Lymph: no lymphadenopathy to low back or LE.   Integumentary: Skin is free of rashes or lesions.   Cardiovascular:  No pitting edema of BLE.      Neurological:  Awake  Alert  Oriented x 3  Speech clear  Cranial nerves II - XII grossly intact  PERRL  EOMI  Face symmetric  Tongue midline  Motor exam   Hip Flexor:                Right: 5/5  Left:  5/5  Quadriceps:              Right:  5/5  Left:  5/5  Hamstrings:              Right:  5/5  Left:  5/5  Gastroc Soleus:        Right:  5/5  Left:  5/5  Tib/Ant:                      Right:  5/5  Left:  5/5  EHL:                          Right:  5/5  Left:  5/5       Sensation normal to bilateral lower extremities.    Reflexes are 2+ in the patellar and Achilles. There is no clonus. Downgoing Babinski.    Musculoskeletal:  Gait: Able to stand from a seated position. Antalgic gait. Ambulates with a limp.  Able to heel/toe walk without loss of balance  Lumbar examination reveals no tenderness of the spine or paraspinous muscles.  Hip height is symmetrical. Negative SI joint, sciatic notch or greater trochanteric tenderness to palpation bilaterally.  Straight leg raise is positive on the left.    Imagin views lumbar spine radiographs 2020     History: Lumbar radiculopathy       Comparison: 12/10/2019     Findings:     Standing  AP and lateral  views of the lumbar spine were obtained.     5  lumbar type vertebral bodies are assumed for the purpose of this dictation.     There is no acute osseous abnormality.  Posterior decompression L2-L3.     There is multilevel degenerative changes of the lumbar spine, most pronounced L3-S1 with significant disc space loss. There is also lower lumbar predominant facet arthropathy. Normal AP alignment maintained.      The visualized bowel gas pattern is non-obstructive.     Vascular calcifications. Significant degenerative changes of the hips.                                                                    Impression:  1.  No acute osseous abnormality. Posterior decompression changes L2-L3.  2.  Multilevel degenerative changes most pronounced L3-S1.      CHRISTIANA HUGHES MD (Joe)    Assessment/Plan:   Tyson Fregoso is a 70 year old male with prior L3-4 discectomy and left L4-5 hemilaminectomy in 1/20 with Dr. Ward who presents for evaluation of left low back and left leg pain x 3 weeks. Pain is located in left low back and radiates radiates down posterolateral left leg to the lateral shin and into 1st-3rd toes. Describes the pain as a deep achy sensation. He has numbness in the 1st-3rd toes. Has been taking aleve and tylenol without relief. Underwent left MAXIME on 6/18 with good relief. Will obtain updated lumbar MRI and call him with results. I did send in a medrol dosepak as well. Patient voiced understanding and agreement.            Regina Lipscomb PA-C  [facility]  [address] [address]    Tel [phone]  Pager [phone]      Again, thank you for allowing me to participate in the care of your patient.        Sincerely,        Regina Lipscomb PA-C

## 2020-08-13 NOTE — PATIENT INSTRUCTIONS
Lumbar MRI ordered. Please call # on card to schedule. I will call you with results.     Medrol dosepak sent to preferred pharmacy.

## 2020-08-17 NOTE — PROGRESS NOTES
Interval History:   Tyson Fregoso is a 70 year old male who is here follow up for:   Left MAXIME - 6/18/2020    Rubén returns today for follow-up following the left total hip replacement.  He notes that things have progressed well over the last month.  His pain, mobility has been steadily improving.  He does continue to have some difficulty sleeping at night.  Aside from this he is doing very well and happy with the outcome thus far.         Physical Exam:     NAD  AOx3  Interactive and cooperative with the exam.  His incision is well-healed.  Ankle plantar flexion dorsiflexion is intact.  He walks with a well-balanced gait with out any assistive devices.  He has no pain with gentle left hip range of motion.         Imaging:      Repeat imaging demonstrates maintained hardware and implant alignment and symmetric leg lengths.       Assessment and Plan:        Tyson Fregoso is a 70 year old male is s/p the above procedure.  Rubén is making excellent progress.  There is no change in position of his implants.  Things are steadily improving.  He continues to continue to progress with activities and exercises as tolerated.  He is no longer need to follow any specific precautions.  I will see him back in clinic in 6 weeks if he has any ongoing issues.  He will let us know if he has any questions or concerns in the meantime.      Logan Waters M.D.     Arthritis and Joint Replacement  Department of Orthopaedic Surgery, [facility]  [email or web address]  [phone] (pager)

## 2020-08-18 ENCOUNTER — HOSPITAL ENCOUNTER (OUTPATIENT)
Dept: MRI IMAGING | Facility: CLINIC | Age: 70
Discharge: HOME | End: 2020-08-18
Attending: PHYSICIAN ASSISTANT | Admitting: PHYSICIAN ASSISTANT
Payer: COMMERCIAL

## 2020-08-18 DIAGNOSIS — Z98.890 S/P LUMBAR MICRODISCECTOMY: ICD-10-CM

## 2020-08-18 DIAGNOSIS — M54.16 LUMBAR RADICULOPATHY: ICD-10-CM

## 2020-08-18 LAB
CREAT BLD-MCNC: 0.9 MG/DL (ref 0.66–1.25)
GFR SERPL CREATININE-BSD FRML MDRD: 83 ML/MIN/{1.73_M2}

## 2020-08-18 PROCEDURE — 82565 ASSAY OF CREATININE: CPT

## 2020-08-18 PROCEDURE — 25500064 ZZH RX 255 OP 636: Performed by: PHYSICIAN ASSISTANT

## 2020-08-18 PROCEDURE — 72158 MRI LUMBAR SPINE W/O & W/DYE: CPT

## 2020-08-18 PROCEDURE — A9585 GADOBUTROL INJECTION: HCPCS | Performed by: PHYSICIAN ASSISTANT

## 2020-08-18 RX ORDER — GADOBUTROL 604.72 MG/ML
7 INJECTION INTRAVENOUS ONCE
Status: COMPLETED | OUTPATIENT
Start: 2020-08-18 | End: 2020-08-18

## 2020-08-18 RX ADMIN — GADOBUTROL 7 ML: 604.72 INJECTION INTRAVENOUS at 07:29

## 2020-08-19 ENCOUNTER — TELEPHONE (OUTPATIENT)
Dept: NEUROSURGERY | Facility: CLINIC | Age: 70
End: 2020-08-19

## 2020-08-19 DIAGNOSIS — M54.50 LOW BACK PAIN: Primary | ICD-10-CM

## 2020-08-19 DIAGNOSIS — Z11.59 ENCOUNTER FOR SCREENING FOR OTHER VIRAL DISEASES: Primary | ICD-10-CM

## 2020-08-19 NOTE — TELEPHONE ENCOUNTER
Patient left voice message returning missed call. Spoke to patient and informed him of Regina's message and recommendations as outlined. He is interested in the injection but would like to hold off on Physical Therapy (PT) until after the injection. Patient is in quite some pain and states even if he tried to work with PT that he would not be able to at this time. Patient was given the number to Radiology Central Scheduling to call and make appointment. He understands to give clinic a call if symptoms persist.

## 2020-08-19 NOTE — TELEPHONE ENCOUNTER
Per Regina Lipscomb PA-C: Hey, can you guys let him know his MRI shows post surgical changes and some narrowing of a few levels. Would recommend PT and S1 MARTHA.     Attempted to reach out to patient, no answer. Left voice message for patient to call clinic back to further discuss.

## 2020-08-21 ENCOUNTER — TELEPHONE (OUTPATIENT)
Dept: MEDSURG UNIT | Facility: CLINIC | Age: 70
End: 2020-08-21

## 2020-08-21 DIAGNOSIS — Z11.59 ENCOUNTER FOR SCREENING FOR OTHER VIRAL DISEASES: ICD-10-CM

## 2020-08-21 PROCEDURE — U0003 INFECTIOUS AGENT DETECTION BY NUCLEIC ACID (DNA OR RNA); SEVERE ACUTE RESPIRATORY SYNDROME CORONAVIRUS 2 (SARS-COV-2) (CORONAVIRUS DISEASE [COVID-19]), AMPLIFIED PROBE TECHNIQUE, MAKING USE OF HIGH THROUGHPUT TECHNOLOGIES AS DESCRIBED BY CMS-2020-01-R: HCPCS | Performed by: PHYSICIAN ASSISTANT

## 2020-08-21 NOTE — TELEPHONE ENCOUNTER
Pre-Procedure Unconfirmed COVID Test.      Patient is having COVID test today at 10:30 am.      Step 1 COVID Screening  The patient was screened for COVID symptoms due to the inability to confirm the patient's COVID status (positive or negative test)    Patient reports the following:  Fever/Chills? No   Cough? No   Shortness of breath? No   New loss of taste or smell? No  Sore throat? No  Muscle or body aches? No  Headaches? No  Fatigue? No  Vomiting or diarrhea? No    Patient informed to contact the ordering provider if any of the symptoms develop prior to the procedure    Step 2 Screening Results (Skip if the patient is negative for symptoms)    Step 3 Review Visitor Policy  Patient informed of the updated visitor policy     1 visitor allowed per patient    Visitor name: Adriana   Visitor must screen negative for COVID symptoms   Visitor must wear a mask   Waiting rooms continue to be closed to visitors

## 2020-08-23 LAB
SARS-COV-2 RNA SPEC QL NAA+PROBE: NOT DETECTED
SPECIMEN SOURCE: NORMAL

## 2020-08-24 ENCOUNTER — HOSPITAL ENCOUNTER (OUTPATIENT)
Facility: CLINIC | Age: 70
Discharge: HOME | End: 2020-08-24
Admitting: PHYSICIAN ASSISTANT
Payer: COMMERCIAL

## 2020-08-24 ENCOUNTER — HOSPITAL ENCOUNTER (OUTPATIENT)
Dept: GENERAL RADIOLOGY | Facility: CLINIC | Age: 70
End: 2020-08-24
Attending: PHYSICIAN ASSISTANT
Payer: COMMERCIAL

## 2020-08-24 VITALS
HEART RATE: 49 BPM | OXYGEN SATURATION: 99 % | SYSTOLIC BLOOD PRESSURE: 146 MMHG | RESPIRATION RATE: 16 BRPM | DIASTOLIC BLOOD PRESSURE: 80 MMHG | TEMPERATURE: 96.8 F

## 2020-08-24 DIAGNOSIS — M54.50 LOW BACK PAIN: ICD-10-CM

## 2020-08-24 PROCEDURE — 25500064 ZZH RX 255 OP 636: Performed by: PHYSICIAN ASSISTANT

## 2020-08-24 PROCEDURE — 25000125 ZZHC RX 250: Performed by: PHYSICIAN ASSISTANT

## 2020-08-24 PROCEDURE — 40000863 ZZH STATISTIC RADIOLOGY XRAY, US, CT, MAR, NM

## 2020-08-24 PROCEDURE — 25000128 H RX IP 250 OP 636: Performed by: PHYSICIAN ASSISTANT

## 2020-08-24 PROCEDURE — 62323 NJX INTERLAMINAR LMBR/SAC: CPT

## 2020-08-24 RX ORDER — NICOTINE POLACRILEX 4 MG
15-30 LOZENGE BUCCAL
Status: CANCELLED | OUTPATIENT
Start: 2020-08-24

## 2020-08-24 RX ORDER — IOPAMIDOL 408 MG/ML
10 INJECTION, SOLUTION INTRATHECAL ONCE
Status: COMPLETED | OUTPATIENT
Start: 2020-08-24 | End: 2020-08-24

## 2020-08-24 RX ORDER — DEXAMETHASONE SODIUM PHOSPHATE 10 MG/ML
10 INJECTION, SOLUTION INTRAMUSCULAR; INTRAVENOUS ONCE
Status: COMPLETED | OUTPATIENT
Start: 2020-08-24 | End: 2020-08-24

## 2020-08-24 RX ORDER — ACETAMINOPHEN 500 MG
500 TABLET ORAL EVERY 4 HOURS PRN
COMMUNITY

## 2020-08-24 RX ORDER — DEXTROSE MONOHYDRATE 25 G/50ML
25-50 INJECTION, SOLUTION INTRAVENOUS
Status: CANCELLED | OUTPATIENT
Start: 2020-08-24

## 2020-08-24 RX ORDER — DEXTROSE MONOHYDRATE 25 G/50ML
25-50 INJECTION, SOLUTION INTRAVENOUS
Status: DISCONTINUED | OUTPATIENT
Start: 2020-08-24 | End: 2020-08-24 | Stop reason: HOSPADM

## 2020-08-24 RX ORDER — NICOTINE POLACRILEX 4 MG
15-30 LOZENGE BUCCAL
Status: DISCONTINUED | OUTPATIENT
Start: 2020-08-24 | End: 2020-08-24 | Stop reason: HOSPADM

## 2020-08-24 RX ADMIN — LIDOCAINE HYDROCHLORIDE 4 ML: 10 INJECTION, SOLUTION EPIDURAL; INFILTRATION; INTRACAUDAL; PERINEURAL at 08:50

## 2020-08-24 RX ADMIN — IOPAMIDOL 1.5 ML: 408 INJECTION, SOLUTION INTRATHECAL at 08:53

## 2020-08-24 RX ADMIN — DEXAMETHASONE SODIUM PHOSPHATE 10 MG: 10 INJECTION, SOLUTION INTRAMUSCULAR; INTRAVENOUS at 08:54

## 2020-08-24 NOTE — PROGRESS NOTES
Care Suites Discharge Nursing Note    Patient Information  Name: Tyson Fregoso  Age: 70 year old    Discharge Education:  Discharge instructions reviewed: Yes  Additional education/resources provided: No  Patient/patient representative verbalizes understanding: Yes  Patient discharging on new medications: No  Medication education completed: N/A    Discharge Plans:   Discharge location: home  Discharge ride contacted: Yes  Approximate discharge time: 0930    Discharge Criteria:  Discharge criteria met and vital signs stable: Yes.  Able to stand and ambulate independently.  Steady on feet.  Tolerated PO.    Patient Belongs:  Patient belongings returned to patient: Yes    Tabitha Elaine RN

## 2020-08-24 NOTE — DISCHARGE INSTRUCTIONS
Steroid Injection Discharge Instructions     After you go home:      You may resume your normal diet.    Care of Puncture Site:      If you have a bandaid on your puncture site, you may remove it the next morning    You may shower tomorrow    No bath tubs, whirlpools or swimming for at least 3 days     Activity:      You may go back to normal activity in 24 hours    You should let pain be your guide as to the extent of your activities    Maintain any activity limitations as ordered by your provider    Do NOT drive a vehicle if you develop numbness in your arm or leg    Medicines:      You may resume all medications    Resume your Warfarin/Coumadin at your regular dose today. Follow up with your provider to have your INR rechecked    Resume your Platelet Inhibitors and Aspirin tomorrow at your regular dose    For minor pain, you may take Acetaminophen (Tylenol) or Ibuprofen (Advil)    Pain:       You may experience increased or different pain over the next 24-48 hours    For the next 48 hrs - you may use ice packs for discomfort     Call your primary care doctor if:      You have severe pain that does not improve with pain medication    You have chills or a fever greater than 101 F (38 C)    The site is red, swollen, hot or tender    New problems with your bowel or bladder    Any questions or concerns    Other Instructions:      New numbness down your leg post injection is temporary and may last for up to 6 hours. You may need assistance with activity until your leg has normal sensation.    If you are diabetic, monitor your blood sugar closely. Contact the provider who manages your diabetes to help you control your blood sugar if needed.    For Your Information:      A steroid was injected to help decrease swelling and may help to reduce pain. It may take up to 7-10 days to obtain full results.    Some patients will get lasting relief from a single injection. Others may require up to 3 injections to get results. If  you have more than one steroid injection, they should be given 2 weeks apart.    Side effects of your steroid injection are mild and will go away in 2-3 days  - Insomnia  - Heartburn  - Flushed face  - Water retention  - Increased appetite  - Increased blood sugar      If you have questions call:        [facility] @ [phone]

## 2020-08-24 NOTE — PROGRESS NOTES
"Care Suites Post Procedure Note    Patient Information  Name: Tyson Fregoso  Age: 70 year old    Post Procedure  Time patient returned to Care Suites: Lumbar epidural steroid injection.  Concerns/abnormal assessment: No.  Patient states \"feels much better\" and rates pain 2/10.  Incision site covered with bandaid.  Site CDI, no swelling.  If abnormal assessment, provider notified: N/A  Plan/Other: Per orders/protocol.    Tabitha Elaine RN     "

## 2020-08-24 NOTE — PROCEDURES
[facility]    Procedure: Left S1 transforaminal epidural steroid injection    Date/Time: 8/24/2020 9:23 AM  Performed by: Betzaida Morales PA-C  Authorized by: Betzaida Morales PA-C     UNIVERSAL PROTOCOL   Site Marked: Yes  Prior Images Obtained and Reviewed:  Yes  Required items: Required blood products, implants, devices and special equipment available    Patient identity confirmed:  Verbally with patient  NA - No sedation, light sedation, or local anesthesia  Confirmation Checklist:  Patient's identity using two indicators, relevant allergies, procedure was appropriate and matched the consent or emergent situation and correct equipment/implants were available  Time out: Immediately prior to the procedure a time out was called    Universal Protocol: the Joint Commission Universal Protocol was followed    Preparation: Patient was prepped and draped in usual sterile fashion           ANESTHESIA    Anesthesia: Local infiltration  Local Anesthetic:  Lidocaine 1% without epinephrine      SEDATION    Patient Sedated: No    See dictated procedure note for full details.  PROCEDURE   Patient Tolerance:  Patient tolerated the procedure well with no immediate complications    Length of time physician/provider present for 1:1 monitoring during sedation: 0

## 2020-08-24 NOTE — PROGRESS NOTES
Care Suites Admission Nursing Note    Patient Information  Name: Tyson Fregoso  Age: 70 year old  Reason for admission: Lumbar epidural steroid injection  Care Suites arrival time: 0725    Visitor Information  Name: YAMINI  Informed of visitor restrictions: N/A  1 visitor allowed per patient   Visitor must screen negative for COVID symptoms   Visitor must wear a mask  Waiting rooms closed to visitors    Patient Admission/Assessment   Pre-procedure assessment complete: Yes  If abnormal assessment/labs, provider notified: N/A  NPO: N/A  Medications held per instructions/orders: N/A  Consent: deferred  If applicable, pregnancy test status: deferred  Patient oriented to room: Yes  Education/questions answered: Yes  Plan/other: Proceed as scheduled.    Discharge Planning  Discharge name/phone number: Betzy-wife [phone]  Overnight post sedation caregiver: YAMINI  Discharge location: home    Tabitha Elaine RN     0800  AVS/Discharge instructions given and reviewed with verbal understanding received.

## 2020-08-31 ENCOUNTER — TELEPHONE (OUTPATIENT)
Dept: NEUROSURGERY | Facility: CLINIC | Age: 70
End: 2020-08-31

## 2020-08-31 DIAGNOSIS — M54.50 LOW BACK PAIN: Primary | ICD-10-CM

## 2020-08-31 NOTE — TELEPHONE ENCOUNTER
Reason For Call: Patient requesting a call back from Regina or a nurse, he would like to talk about his post Injection pain, he can be reached at [phone].

## 2020-08-31 NOTE — TELEPHONE ENCOUNTER
Per SURY Tapia, order lumbar CT and have patient follow-up with Dr. Domínguez. Informed patient and he verbalized understanding. CT ordered. Patient will call Radiology central scheduling to arrange CT and then call clinic back to make an appointment with Dr. Domínguez

## 2020-08-31 NOTE — TELEPHONE ENCOUNTER
Spoke to Rubén and he had the left S1 transforaminal epidural steroid injection on 8/24. He states it has not provided any pain relief. He continues to have the intermittent flare-ups with the low back pain that radiates down his left lower extremity. He also complains that his toes are numb. Patient mentioned he was advised to allow 7 days and if he would like to move forward with another injection that he could have another one next Monday. Informed him a message will be sent to SURY Tapia, for further review and advice.

## 2020-09-04 ENCOUNTER — HOSPITAL ENCOUNTER (OUTPATIENT)
Dept: CT IMAGING | Facility: CLINIC | Age: 70
Discharge: HOME | End: 2020-09-04
Attending: PHYSICIAN ASSISTANT | Admitting: PHYSICIAN ASSISTANT
Payer: COMMERCIAL

## 2020-09-04 DIAGNOSIS — M54.50 LOW BACK PAIN: ICD-10-CM

## 2020-09-04 PROCEDURE — 72131 CT LUMBAR SPINE W/O DYE: CPT

## 2020-09-09 ENCOUNTER — TELEPHONE (OUTPATIENT)
Dept: NEUROSURGERY | Facility: CLINIC | Age: 70
End: 2020-09-09

## 2020-09-09 NOTE — TELEPHONE ENCOUNTER
Rubén left a voice message stating he continues to have the sciatic pain and requests for a call back. Spoke to patient and he questioned if his appointment could be moved up sooner. Informed her there are no openings to move his appointment up. Follow-up appointment with Dr. Domínguez is on 9/14. Patient states he is taking over-the-counter medications with little relief. Advised he reach out to his primary care provider for further recommendations on pain medications or if pain is unbearable/severe that he goes to the emergency room. Gave patient the address to clinic and reminded him of appointment time.

## 2020-09-14 ENCOUNTER — OFFICE VISIT (OUTPATIENT)
Dept: NEUROSURGERY | Facility: CLINIC | Age: 70
End: 2020-09-14
Attending: NEUROLOGICAL SURGERY
Payer: COMMERCIAL

## 2020-09-14 VITALS
OXYGEN SATURATION: 99 % | HEART RATE: 65 BPM | BODY MASS INDEX: 23.57 KG/M2 | TEMPERATURE: 97.8 F | WEIGHT: 174 LBS | HEIGHT: 72 IN | DIASTOLIC BLOOD PRESSURE: 77 MMHG | SYSTOLIC BLOOD PRESSURE: 160 MMHG

## 2020-09-14 DIAGNOSIS — M54.16 LUMBAR RADICULOPATHY: Primary | ICD-10-CM

## 2020-09-14 DIAGNOSIS — Z11.59 ENCOUNTER FOR SCREENING FOR OTHER VIRAL DISEASES: Primary | ICD-10-CM

## 2020-09-14 PROCEDURE — G0463 HOSPITAL OUTPT CLINIC VISIT: HCPCS

## 2020-09-14 PROCEDURE — 99214 OFFICE O/P EST MOD 30 MIN: CPT | Performed by: NEUROLOGICAL SURGERY

## 2020-09-14 RX ORDER — NAPROXEN SODIUM 220 MG
220 TABLET ORAL DAILY PRN
Status: ON HOLD | COMMUNITY
End: 2020-09-23

## 2020-09-14 ASSESSMENT — PAIN SCALES - GENERAL: PAINLEVEL: EXTREME PAIN (8)

## 2020-09-14 ASSESSMENT — MIFFLIN-ST. JEOR: SCORE: 1577.26

## 2020-09-14 NOTE — PATIENT INSTRUCTIONS
Patient Instructions    Surgery scheduled at [facility] for MIS Left L4-5 lateral recess decompression with Dr. Domínguez    Pre-Operative    Surgical risks: blood clots in the leg or lung, problems urinating, nerve damage, drainage from the incision, infection,stiffness    Pre-operative physical with primary care physician within 30 days of surgical date.     Likely same day procedure with discharge home day of surgery, may stay for 23 hour observation hospitalization for monitoring.       Shower procedure  o Please shower with antimicrobial soap the night before surgery and morning of surgery. Please refer to showering instruction sheet in folder.    Eating/Drinking  o Stop all solid foods 8 hours before surgery.  o Keep drinking clear liquids until 4 hours before surgery  - Clear liquids include water, clear juice, black coffee, or clear tea without milk, Gatorade, clear soda.     Medications  o Hold Aspirin, NSAIDs (Advil/Ibuprofen, Indocin, Naproxen,Nuprin,Relafen/Nabumetone, Diclofenac,Meloxicam, Aleve, Celebrex) x 7 days prior to surgical date  o You can take Tylenol (Acetaminophen) for pain, 1000 mg  - Do not exceed 3,000 mg per day   o Any other medications prescribed, please discuss with your primary care provider at your pre-operative physical     Post-Operative    Pain Management  o You will have some post-operative incisional pain which may require pain medications and muscle relaxants. You will receive medication upon discharge.  o You may resume taking NSAIDs (ex. Ibuprofen, aleve, naproxen) 1 week post op   o Do NOT drive while taking narcotic pain medication  o Do NOT drink alcohol while using any pain medication  o You can utilize ice as needed (no longer than 20 minutes at one time)    Incision Care  o No submerging incision in water such as pools, hot tubs, baths for at least 8 weeks or until incision is healed  o It is okay to shower, just pat the incision dry   o Remove dressing as  instructed upon discharge  o Watch for signs of infection  - Redness, swelling, warmth, drainage, and fever of 101 degrees or higher  - Notify clinic [phone]    Bowel Care  o Many people have constipation (hard stools) after surgery.  To help prevent constipation: Drink plenty of fluid (8-10 glasses/day); Eat more fiber, such as whole grain bread, bran cereal, and fruits and vegetables; Stay active by walking; Over the counter stool softener may also help.      Activity Restrictions  o For the first 4-6 weeks, no lifting > 10 pounds, limited bending, twisting, or overhead reaching.  o Take stairs in moderation   o Ok to walk as tolerated, take short frequent walks. You may gradually increase the distance as tolerated.   o Avoid bed rest and prolonged sitting for longer than 30 minutes (change positions frequently while awake)  o No contact sports until after follow up visit  o No high impact activities such as; running/jogging, snowmobile or 4 aragon riding or any other recreational vehicles  o Please call the clinic if you develop any of the following symptoms:  - Swelling and/or warmth in one or both legs  - Pain or tenderness in your leg, ankle, foot, or arm   - Red or discolored skin     Post-Op Follow Up Appointments    2 week incision check with RN    3 months post op with Physician Assistant     Please call to schedule follow up appointment at [phone]    Resources    If you are currently employed, you will need to be off work for 2-4 weeks for post op recovery and healing.    Please fax any FMLA/short term disability paperwork to [phone]    You may call our clinic when you'd like to return to work and we can provide a work letter    To allow staff adequate time to complete paperwork, please send as soon as possible

## 2020-09-14 NOTE — LETTER
9/14/2020         RE: Tyson Fregoso  [address]  [place] [address]        Dear Colleague,    Thank you for referring your patient, Tyson Fregoso, to the [facility]. Please see a copy of my visit note below.      Patient Education    Education included but not limited to:  - Surgical risks: blood clots, urinating difficulties, nerve damage, infection.  - Pre-operative physical with primary care physician within 30 days of surgical date.   - Pre-operative clearance from other pertaining specialties.   - Discontinue NSAIDS x 7 days prior to surgical date.   - Do not begin taking NSAIDs (Advil, Motrin, Ibuprofen, Nuprin, Diclofenac, Meloxicam, Aleve, Celebrex, Aspirin, etc.) until 2 weeks after surgery if you had a fusion. May cause bleeding and interfere with bone healing.    -May try Tylenol for pain    -Discussed being off work after surgery, short term disability, FMLA, etc.   -Forms to be completed    -Pre-op timeline: NPO, shower, medications    -Hospital stay: Checking in, surgery, recovery room, hospital room.    - Post operative pain management: narcotics, muscle relaxants, ice, etc.   -No driving while taking narcotics     -Post operative incision care:   Keep your incision clean and dry.   Okay to shower. No submerging in water until incision healed.   Watch for signs of infection and notify clinic if drainage or fever develops.   - Post operative activity limitations recommended until follow up appointment: no lifting > 10 pounds; limited bending, twisting, overhead reaching.  -If a brace is required per Dr. Domínguez, Orthotics will fit you for the brace in the hospital.  - Follow up appointments: Telephone RN incision check at 2 weeks, 6 week post op, 3 months post op, 6 months post op, 1 year post op.   - Education book was also given to the patient for further review.      Patient verbalized understanding of above instructions. All questions were  answered to the best of my ability and the patient's satisfaction. Patient advised to call with any additional questions or concerns.                Tyson Fregoso is a 70 year old male with prior L3-4 discectomy and left L4-5 hemilaminectomy in 1/20 with Dr. Ward who presents for evaluation of left low back and left leg pain x 3 weeks. Pain is located in left low back and radiates radiates down posterolateral left leg to the lateral shin and into 1st-3rd toes. Describes the pain as a deep achy sensation. He has numbness in the 1st-3rd toes. Pain is worsened with mowing the lawn. Cannot recall anything that alleviates the pain. Has been taking aleve and tylenol without relief. Underwent left MAXIME on 6/18 with good relief. No recent imaging, PT, or injections. Denies bladder/bowel incontinence.    Returns for follow up.  Past right L4-5 microdiscectomy and L3-4 decompression.  Severe LLE pain as above.  MARTHA and home exercises without improvement.    Past Medical History reviewed with patient during visit.    Past Surgical History:   Procedure Laterality Date     ARTHROPLASTY HIP Left 6/18/2020    Procedure: ARTHROPLASTY, HIP, TOTAL LEFT;  Surgeon: Logan Waters MD;  Location: [facility] OR     INJECTION, ANESTHETIC/STEROID, TRANSFORAMINAL EPIDURAL; LUMBAR/SACRAL, SINGLE LEVEL  11/2019     LAMINECTOMY LUMBAR TWO LEVELS N/A 1/8/2020    Procedure: Lumbar 3 to 4 decompression and discectomy and left Lumbar 4-5 Hemilaminectomy;  Surgeon: Jan Ward MD;  Location: [facility] OR     microdiscectomy  2001    L4-5     SHOULDER SURGERY Bilateral     arthroscopies (Left 2005, right 2013)     Past Surgical History reviewed with patient during visit.    Current Outpatient Medications   Medication     acetaminophen (TYLENOL) 500 MG tablet     glucosamine-chondroitin 500-400 MG CAPS per capsule     naproxen sodium (ANAPROX) 220 MG tablet     methocarbamol (ROBAXIN) 500 MG tablet     multivitamin w/minerals  (MULTI-VITAMIN) tablet     No current facility-administered medications for this visit.        No Known Allergies    Social History     Socioeconomic History     Marital status:      Spouse name: Not on file     Number of children: Not on file     Years of education: Not on file     Highest education level: Not on file   Occupational History     Not on file   Social Needs     Financial resource strain: Not on file     Food insecurity     Worry: Not on file     Inability: Not on file     Transportation needs     Medical: Not on file     Non-medical: Not on file   Tobacco Use     Smoking status: Former Smoker     Packs/day: 0.50     Years: 17.00     Pack years: 8.50     Last attempt to quit:      Years since quittin.6     Smokeless tobacco: Never Used   Substance and Sexual Activity     Alcohol use: Yes     Alcohol/week: 3.0 standard drinks     Types: 3 Cans of beer per week     Drinks per session: 3 or 4     Binge frequency: Daily or almost daily     Drug use: Not on file     Sexual activity: Not on file   Lifestyle     Physical activity     Days per week: Not on file     Minutes per session: Not on file     Stress: Not on file   Relationships     Social connections     Talks on phone: Not on file     Gets together: Not on file     Attends Buddhist service: Not on file     Active member of club or organization: Not on file     Attends meetings of clubs or organizations: Not on file     Relationship status: Not on file     Intimate partner violence     Fear of current or ex partner: Not on file     Emotionally abused: Not on file     Physically abused: Not on file     Forced sexual activity: Not on file   Other Topics Concern     Not on file   Social History Narrative     Not on file       Family History   Problem Relation Age of Onset     Deep Vein Thrombosis Brother      Deep Vein Thrombosis (DVT) Son      Anesthesia Reaction No family hx of      Cardiovascular No family hx of           ROS: 10  point ROS neg other than the symptoms noted above in the HPI.    Vital Signs: BP (!) 160/77 (BP Location: Left arm)   Pulse 65   Temp 97.8  F (36.6  C)   Ht 1.829 m (6')   Wt 78.9 kg (174 lb)   SpO2 99%   BMI 23.60 kg/m      Examination:  Constitutional:  Alert, well nourished, NAD.  HEENT: Normocephalic, atraumatic.   Pulmonary:  Without shortness of breath, normal effort.   Lymph: no lymphadenopathy to low back or LE.   Integumentary: Skin is free of rashes or lesions.   Cardiovascular:  No pitting edema of BLE.      Neurological:  Awake  Alert  Oriented x 3  Speech clear  Cranial nerves II - XII grossly intact  PERRL  EOMI  Face symmetric  Tongue midline  Motor exam   Hip Flexor:                Right: 5/5  Left:  5/5  Quadriceps:              Right:  5/5  Left:  5/5  Hamstrings:              Right:  5/5  Left:  5/5  Gastroc Soleus:        Right:  5/5  Left:  5/5  Tib/Ant:                      Right:  5/5  Left:  5/5  EHL:                          Right:  5/5  Left:  5/5       Sensation normal to bilateral lower extremities.    Reflexes are 2+ in the patellar and Achilles. There is no clonus. Downgoing Babinski.    Musculoskeletal:  Gait: Able to stand from a seated position. Antalgic gait. Ambulates with a limp.  Able to heel/toe walk without loss of balance  Lumbar examination reveals no tenderness of the spine or paraspinous muscles.  Hip height is symmetrical. Negative SI joint, sciatic notch or greater trochanteric tenderness to palpation bilaterally.  Straight leg raise is positive on the left.    Imagin views lumbar spine radiographs 2020     History: Lumbar radiculopathy      Comparison: 12/10/2019     Findings:     Standing  AP and lateral  views of the lumbar spine were obtained.     5  lumbar type vertebral bodies are assumed for the purpose of this dictation.     There is no acute osseous abnormality.  Posterior decompression L2-L3.     There is multilevel degenerative changes of the  lumbar spine, most pronounced L3-S1 with significant disc space loss. There is also lower lumbar predominant facet arthropathy. Normal AP alignment maintained.      The visualized bowel gas pattern is non-obstructive.     Vascular calcifications. Significant degenerative changes of the hips.                                                                    Impression:  1.  No acute osseous abnormality. Posterior decompression changes L2-L3.  2.  Multilevel degenerative changes most pronounced L3-S1.      CHRISTIANA (Baldo HUGHES MD    Assessment/Plan:   Tyson Fregoso is a 70 year old male with prior L3-4 discectomy in 1/20 with Dr. Ward who presents for evaluation of left low back and left leg pain x 3 weeks. Pain is located in left low back and radiates radiates down posterolateral left leg to the lateral shin and into 1st-3rd toes. Describes the pain as a deep achy sensation. He has numbness in the 1st-3rd toes.     Will plan for MIS Left L4-5 lateral recess decompression  Risks and benefits discussed    Again, thank you for allowing me to participate in the care of your patient.        Sincerely,        Rai Domínguez MD

## 2020-09-14 NOTE — PROGRESS NOTES
Patient Education    Education included but not limited to:  - Surgical risks: blood clots, urinating difficulties, nerve damage, infection.  - Pre-operative physical with primary care physician within 30 days of surgical date.   - Pre-operative clearance from other pertaining specialties.   - Discontinue NSAIDS x 7 days prior to surgical date.   - Do not begin taking NSAIDs (Advil, Motrin, Ibuprofen, Nuprin, Diclofenac, Meloxicam, Aleve, Celebrex, Aspirin, etc.) until 2 weeks after surgery if you had a fusion. May cause bleeding and interfere with bone healing.    -May try Tylenol for pain    -Discussed being off work after surgery, short term disability, FMLA, etc.   -Forms to be completed    -Pre-op timeline: NPO, shower, medications    -Hospital stay: Checking in, surgery, recovery room, hospital room.    - Post operative pain management: narcotics, muscle relaxants, ice, etc.   -No driving while taking narcotics     -Post operative incision care:   Keep your incision clean and dry.   Okay to shower. No submerging in water until incision healed.   Watch for signs of infection and notify clinic if drainage or fever develops.   - Post operative activity limitations recommended until follow up appointment: no lifting > 10 pounds; limited bending, twisting, overhead reaching.  -If a brace is required per Dr. Domínguez, Orthotics will fit you for the brace in the hospital.  - Follow up appointments: Telephone RN incision check at 2 weeks, 6 week post op, 3 months post op, 6 months post op, 1 year post op.   - Education book was also given to the patient for further review.      Patient verbalized understanding of above instructions. All questions were answered to the best of my ability and the patient's satisfaction. Patient advised to call with any additional questions or concerns.

## 2020-09-14 NOTE — NURSING NOTE
Tyson Fregoso is a 70 year old male who presents for:  Chief Complaint   Patient presents with     Back Pain     F/u Lumbar radiculopathy        Initial Vitals:  BP (!) 171/92 (BP Location: Left arm, Patient Position: Sitting)   Pulse 68   Temp 97.8  F (36.6  C)   Ht 6' (1.829 m)   Wt 174 lb (78.9 kg)   SpO2 99%   BMI 23.60 kg/m   Estimated body mass index is 23.6 kg/m  as calculated from the following:    Height as of this encounter: 6' (1.829 m).    Weight as of this encounter: 174 lb (78.9 kg).. Body surface area is 2 meters squared. BP completed using cuff size: regular  Extreme Pain (8)    Nursing Comments: Patient presents w/ LBP radiates left to foot    Chase Velasquez MA

## 2020-09-14 NOTE — PROGRESS NOTES
Tyson Fregoso is a 70 year old male with prior L3-4 discectomy and left L4-5 hemilaminectomy in 1/20 with Dr. Ward who presents for evaluation of left low back and left leg pain x 3 weeks. Pain is located in left low back and radiates radiates down posterolateral left leg to the lateral shin and into 1st-3rd toes. Describes the pain as a deep achy sensation. He has numbness in the 1st-3rd toes. Pain is worsened with mowing the lawn. Cannot recall anything that alleviates the pain. Has been taking aleve and tylenol without relief. Underwent left MAXIME on 6/18 with good relief. No recent imaging, PT, or injections. Denies bladder/bowel incontinence.    Returns for follow up.  Past right L4-5 microdiscectomy and L3-4 decompression.  Severe LLE pain as above.  MARTHA and home exercises without improvement.    Past Medical History reviewed with patient during visit.    Past Surgical History:   Procedure Laterality Date     ARTHROPLASTY HIP Left 6/18/2020    Procedure: ARTHROPLASTY, HIP, TOTAL LEFT;  Surgeon: Logan Waters MD;  [facility]: [facility]     INJECTION, ANESTHETIC/STEROID, TRANSFORAMINAL EPIDURAL; LUMBAR/SACRAL, SINGLE LEVEL  11/2019     LAMINECTOMY LUMBAR TWO LEVELS N/A 1/8/2020    Procedure: Lumbar 3 to 4 decompression and discectomy and left Lumbar 4-5 Hemilaminectomy;  Surgeon: Jan Ward MD;  [facility]: [facility] OR     microdiscectomy  2001    L4-5     SHOULDER SURGERY Bilateral     arthroscopies (Left 2005, right 2013)     Past Surgical History reviewed with patient during visit.    Current Outpatient Medications   Medication     acetaminophen (TYLENOL) 500 MG tablet     glucosamine-chondroitin 500-400 MG CAPS per capsule     naproxen sodium (ANAPROX) 220 MG tablet     methocarbamol (ROBAXIN) 500 MG tablet     multivitamin w/minerals (MULTI-VITAMIN) tablet     No current facility-administered medications for this visit.        No Known Allergies    Social History     Socioeconomic History      Marital status:      Spouse name: Not on file     Number of children: Not on file     Years of education: Not on file     Highest education level: Not on file   Occupational History     Not on file   Social Needs     Financial resource strain: Not on file     Food insecurity     Worry: Not on file     Inability: Not on file     Transportation needs     Medical: Not on file     Non-medical: Not on file   Tobacco Use     Smoking status: Former Smoker     Packs/day: 0.50     Years: 17.00     Pack years: 8.50     Last attempt to quit:      Years since quittin.6     Smokeless tobacco: Never Used   Substance and Sexual Activity     Alcohol use: Yes     Alcohol/week: 3.0 standard drinks     Types: 3 Cans of beer per week     Drinks per session: 3 or 4     Binge frequency: Daily or almost daily     Drug use: Not on file     Sexual activity: Not on file   Lifestyle     Physical activity     Days per week: Not on file     Minutes per session: Not on file     Stress: Not on file   Relationships     Social connections     Talks on phone: Not on file     Gets together: Not on file     Attends Anabaptism service: Not on file     Active member of club or organization: Not on file     Attends meetings of clubs or organizations: Not on file     Relationship status: Not on file     Intimate partner violence     Fear of current or ex partner: Not on file     Emotionally abused: Not on file     Physically abused: Not on file     Forced sexual activity: Not on file   Other Topics Concern     Not on file   Social History Narrative     Not on file       Family History   Problem Relation Age of Onset     Deep Vein Thrombosis Brother      Deep Vein Thrombosis (DVT) Son      Anesthesia Reaction No family hx of      Cardiovascular No family hx of           ROS: 10 point ROS neg other than the symptoms noted above in the HPI.    Vital Signs: BP (!) 160/77 (BP Location: Left arm)   Pulse 65   Temp 97.8  F (36.6  C)   Ht  1.829 m (6')   Wt 78.9 kg (174 lb)   SpO2 99%   BMI 23.60 kg/m      Examination:  Constitutional:  Alert, well nourished, NAD.  HEENT: Normocephalic, atraumatic.   Pulmonary:  Without shortness of breath, normal effort.   Lymph: no lymphadenopathy to low back or LE.   Integumentary: Skin is free of rashes or lesions.   Cardiovascular:  No pitting edema of BLE.      Neurological:  Awake  Alert  Oriented x 3  Speech clear  Cranial nerves II - XII grossly intact  PERRL  EOMI  Face symmetric  Tongue midline  Motor exam   Hip Flexor:                Right: 5/5  Left:  5/5  Quadriceps:              Right:  5/5  Left:  5/5  Hamstrings:              Right:  5/5  Left:  5/5  Gastroc Soleus:        Right:  5/5  Left:  5/5  Tib/Ant:                      Right:  5/5  Left:  5/5  EHL:                          Right:  5/5  Left:  5/5       Sensation normal to bilateral lower extremities.    Reflexes are 2+ in the patellar and Achilles. There is no clonus. Downgoing Babinski.    Musculoskeletal:  Gait: Able to stand from a seated position. Antalgic gait. Ambulates with a limp.  Able to heel/toe walk without loss of balance  Lumbar examination reveals no tenderness of the spine or paraspinous muscles.  Hip height is symmetrical. Negative SI joint, sciatic notch or greater trochanteric tenderness to palpation bilaterally.  Straight leg raise is positive on the left.    Imagin views lumbar spine radiographs 2020     History: Lumbar radiculopathy      Comparison: 12/10/2019     Findings:     Standing  AP and lateral  views of the lumbar spine were obtained.     5  lumbar type vertebral bodies are assumed for the purpose of this dictation.     There is no acute osseous abnormality.  Posterior decompression L2-L3.     There is multilevel degenerative changes of the lumbar spine, most pronounced L3-S1 with significant disc space loss. There is also lower lumbar predominant facet arthropathy. Normal AP alignment maintained.       The visualized bowel gas pattern is non-obstructive.     Vascular calcifications. Significant degenerative changes of the hips.                                                                    Impression:  1.  No acute osseous abnormality. Posterior decompression changes L2-L3.  2.  Multilevel degenerative changes most pronounced L3-S1.      CHRISTIANA HUGHES MD (Joe)    Assessment/Plan:   Tyson Fregoso is a 70 year old male with prior L3-4 discectomy in 1/20 with Dr. Ward who presents for evaluation of left low back and left leg pain x 3 weeks. Pain is located in left low back and radiates radiates down posterolateral left leg to the lateral shin and into 1st-3rd toes. Describes the pain as a deep achy sensation. He has numbness in the 1st-3rd toes.     Will plan for MIS Left L4-5 lateral recess decompression  Risks and benefits discussed

## 2020-09-20 DIAGNOSIS — Z11.59 ENCOUNTER FOR SCREENING FOR OTHER VIRAL DISEASES: ICD-10-CM

## 2020-09-20 PROCEDURE — U0003 INFECTIOUS AGENT DETECTION BY NUCLEIC ACID (DNA OR RNA); SEVERE ACUTE RESPIRATORY SYNDROME CORONAVIRUS 2 (SARS-COV-2) (CORONAVIRUS DISEASE [COVID-19]), AMPLIFIED PROBE TECHNIQUE, MAKING USE OF HIGH THROUGHPUT TECHNOLOGIES AS DESCRIBED BY CMS-2020-01-R: HCPCS | Performed by: NEUROLOGICAL SURGERY

## 2020-09-21 LAB
SARS-COV-2 RNA SPEC QL NAA+PROBE: NOT DETECTED
SPECIMEN SOURCE: NORMAL

## 2020-09-22 ENCOUNTER — TELEPHONE (OUTPATIENT)
Dept: NEUROSURGERY | Facility: CLINIC | Age: 70
End: 2020-09-22

## 2020-09-22 NOTE — TELEPHONE ENCOUNTER
Patient left a voice message stating he had questions in regards to surgery tomorrow. Spoke to patient and he states he had already been contacted in regards to check-in details. Denies any further questions at this time.

## 2020-09-23 ENCOUNTER — HOSPITAL ENCOUNTER (OUTPATIENT)
Facility: CLINIC | Age: 70
Discharge: HOME | End: 2020-09-23
Attending: NEUROLOGICAL SURGERY | Admitting: NEUROLOGICAL SURGERY
Payer: COMMERCIAL

## 2020-09-23 ENCOUNTER — APPOINTMENT (OUTPATIENT)
Dept: GENERAL RADIOLOGY | Facility: CLINIC | Age: 70
End: 2020-09-23
Attending: NEUROLOGICAL SURGERY
Payer: COMMERCIAL

## 2020-09-23 ENCOUNTER — ANESTHESIA EVENT (OUTPATIENT)
Dept: SURGERY | Facility: CLINIC | Age: 70
End: 2020-09-23
Payer: COMMERCIAL

## 2020-09-23 ENCOUNTER — ANESTHESIA (OUTPATIENT)
Dept: SURGERY | Facility: CLINIC | Age: 70
End: 2020-09-23
Payer: COMMERCIAL

## 2020-09-23 VITALS
HEART RATE: 53 BPM | OXYGEN SATURATION: 97 % | WEIGHT: 173.8 LBS | BODY MASS INDEX: 23.54 KG/M2 | HEIGHT: 72 IN | DIASTOLIC BLOOD PRESSURE: 92 MMHG | RESPIRATION RATE: 14 BRPM | TEMPERATURE: 97.4 F | SYSTOLIC BLOOD PRESSURE: 165 MMHG

## 2020-09-23 DIAGNOSIS — M54.16 LUMBAR RADICULOPATHY: ICD-10-CM

## 2020-09-23 PROCEDURE — 25000128 H RX IP 250 OP 636: Performed by: NEUROLOGICAL SURGERY

## 2020-09-23 PROCEDURE — 63047 LAM FACETEC & FORAMOT LUMBAR: CPT | Performed by: NEUROLOGICAL SURGERY

## 2020-09-23 PROCEDURE — 25000128 H RX IP 250 OP 636: Performed by: PHYSICIAN ASSISTANT

## 2020-09-23 PROCEDURE — 25000566 ZZH SEVOFLURANE, EA 15 MIN: Performed by: NEUROLOGICAL SURGERY

## 2020-09-23 PROCEDURE — 36000065 ZZH SURGERY LEVEL 4 W FLUORO 1ST 30 MIN: Performed by: NEUROLOGICAL SURGERY

## 2020-09-23 PROCEDURE — 36000063 ZZH SURGERY LEVEL 4 EA 15 ADDTL MIN: Performed by: NEUROLOGICAL SURGERY

## 2020-09-23 PROCEDURE — 37000008 ZZH ANESTHESIA TECHNICAL FEE, 1ST 30 MIN: Performed by: NEUROLOGICAL SURGERY

## 2020-09-23 PROCEDURE — 25000128 H RX IP 250 OP 636: Performed by: NURSE ANESTHETIST, CERTIFIED REGISTERED

## 2020-09-23 PROCEDURE — 40000170 ZZH STATISTIC PRE-PROCEDURE ASSESSMENT II: Performed by: NEUROLOGICAL SURGERY

## 2020-09-23 PROCEDURE — 71000027 ZZH RECOVERY PHASE 2 EACH 15 MINS: Performed by: NEUROLOGICAL SURGERY

## 2020-09-23 PROCEDURE — 37000009 ZZH ANESTHESIA TECHNICAL FEE, EACH ADDTL 15 MIN: Performed by: NEUROLOGICAL SURGERY

## 2020-09-23 PROCEDURE — 25000125 ZZHC RX 250: Performed by: NEUROLOGICAL SURGERY

## 2020-09-23 PROCEDURE — 25000301 ZZH OR RX SURGIFLO W/THROMBIN KIT 2ML 1991 OPNP: Performed by: NEUROLOGICAL SURGERY

## 2020-09-23 PROCEDURE — 25000125 ZZHC RX 250: Performed by: NURSE ANESTHETIST, CERTIFIED REGISTERED

## 2020-09-23 PROCEDURE — 71000012 ZZH RECOVERY PHASE 1 LEVEL 1 FIRST HR: Performed by: NEUROLOGICAL SURGERY

## 2020-09-23 PROCEDURE — 25800030 ZZH RX IP 258 OP 636: Performed by: NURSE ANESTHETIST, CERTIFIED REGISTERED

## 2020-09-23 PROCEDURE — 63047 LAM FACETEC & FORAMOT LUMBAR: CPT | Mod: AS | Performed by: PHYSICIAN ASSISTANT

## 2020-09-23 PROCEDURE — 40000278 XR SURGERY CARM FLUORO LESS THAN 5 MIN

## 2020-09-23 PROCEDURE — 27210794 ZZH OR GENERAL SUPPLY STERILE: Performed by: NEUROLOGICAL SURGERY

## 2020-09-23 PROCEDURE — 25000132 ZZH RX MED GY IP 250 OP 250 PS 637: Performed by: PHYSICIAN ASSISTANT

## 2020-09-23 RX ORDER — NALOXONE HYDROCHLORIDE 0.4 MG/ML
.1-.4 INJECTION, SOLUTION INTRAMUSCULAR; INTRAVENOUS; SUBCUTANEOUS
Status: CANCELLED | OUTPATIENT
Start: 2020-09-23 | End: 2020-09-24

## 2020-09-23 RX ORDER — CEFAZOLIN SODIUM 2 G/100ML
2 INJECTION, SOLUTION INTRAVENOUS
Status: COMPLETED | OUTPATIENT
Start: 2020-09-23 | End: 2020-09-23

## 2020-09-23 RX ORDER — ONDANSETRON 4 MG/1
4 TABLET, ORALLY DISINTEGRATING ORAL EVERY 30 MIN PRN
Status: CANCELLED | OUTPATIENT
Start: 2020-09-23

## 2020-09-23 RX ORDER — DEXAMETHASONE SODIUM PHOSPHATE 4 MG/ML
INJECTION, SOLUTION INTRA-ARTICULAR; INTRALESIONAL; INTRAMUSCULAR; INTRAVENOUS; SOFT TISSUE PRN
Status: DISCONTINUED | OUTPATIENT
Start: 2020-09-23 | End: 2020-09-23

## 2020-09-23 RX ORDER — ONDANSETRON 2 MG/ML
INJECTION INTRAMUSCULAR; INTRAVENOUS PRN
Status: DISCONTINUED | OUTPATIENT
Start: 2020-09-23 | End: 2020-09-23

## 2020-09-23 RX ORDER — ACETAMINOPHEN 325 MG/1
975 TABLET ORAL ONCE
Status: COMPLETED | OUTPATIENT
Start: 2020-09-23 | End: 2020-09-23

## 2020-09-23 RX ORDER — HYDROXYZINE HYDROCHLORIDE 10 MG/1
10 TABLET, FILM COATED ORAL
Status: CANCELLED | OUTPATIENT
Start: 2020-09-23

## 2020-09-23 RX ORDER — GABAPENTIN 100 MG/1
100 CAPSULE ORAL
Status: COMPLETED | OUTPATIENT
Start: 2020-09-23 | End: 2020-09-23

## 2020-09-23 RX ORDER — NEOSTIGMINE METHYLSULFATE 1 MG/ML
VIAL (ML) INJECTION PRN
Status: DISCONTINUED | OUTPATIENT
Start: 2020-09-23 | End: 2020-09-23

## 2020-09-23 RX ORDER — HYDROMORPHONE HYDROCHLORIDE 1 MG/ML
.3-.5 INJECTION, SOLUTION INTRAMUSCULAR; INTRAVENOUS; SUBCUTANEOUS EVERY 10 MIN PRN
Status: CANCELLED | OUTPATIENT
Start: 2020-09-23

## 2020-09-23 RX ORDER — ALBUTEROL SULFATE 0.83 MG/ML
2.5 SOLUTION RESPIRATORY (INHALATION)
Status: CANCELLED | OUTPATIENT
Start: 2020-09-23

## 2020-09-23 RX ORDER — AMOXICILLIN 250 MG
1-2 CAPSULE ORAL 2 TIMES DAILY
Qty: 30 TABLET | Refills: 0 | Status: SHIPPED | OUTPATIENT
Start: 2020-09-23

## 2020-09-23 RX ORDER — OXYCODONE HYDROCHLORIDE 5 MG/1
5-10 TABLET ORAL
Status: CANCELLED | OUTPATIENT
Start: 2020-09-23

## 2020-09-23 RX ORDER — METHOCARBAMOL 750 MG/1
750 TABLET, FILM COATED ORAL
Status: CANCELLED | OUTPATIENT
Start: 2020-09-23

## 2020-09-23 RX ORDER — HYDROXYZINE HYDROCHLORIDE 25 MG/1
25 TABLET, FILM COATED ORAL
Status: CANCELLED | OUTPATIENT
Start: 2020-09-23

## 2020-09-23 RX ORDER — CEFAZOLIN SODIUM 1 G/3ML
1 INJECTION, POWDER, FOR SOLUTION INTRAMUSCULAR; INTRAVENOUS SEE ADMIN INSTRUCTIONS
Status: DISCONTINUED | OUTPATIENT
Start: 2020-09-23 | End: 2020-09-23 | Stop reason: HOSPADM

## 2020-09-23 RX ORDER — DIMENHYDRINATE 50 MG/ML
12.5 INJECTION, SOLUTION INTRAMUSCULAR; INTRAVENOUS
Status: CANCELLED | OUTPATIENT
Start: 2020-09-23

## 2020-09-23 RX ORDER — BUPIVACAINE HYDROCHLORIDE AND EPINEPHRINE 5; 5 MG/ML; UG/ML
INJECTION, SOLUTION PERINEURAL PRN
Status: DISCONTINUED | OUTPATIENT
Start: 2020-09-23 | End: 2020-09-23 | Stop reason: HOSPADM

## 2020-09-23 RX ORDER — DEXAMETHASONE SODIUM PHOSPHATE 4 MG/ML
4 INJECTION, SOLUTION INTRA-ARTICULAR; INTRALESIONAL; INTRAMUSCULAR; INTRAVENOUS; SOFT TISSUE EVERY 10 MIN PRN
Status: CANCELLED | OUTPATIENT
Start: 2020-09-23

## 2020-09-23 RX ORDER — ONDANSETRON 2 MG/ML
4 INJECTION INTRAMUSCULAR; INTRAVENOUS EVERY 30 MIN PRN
Status: CANCELLED | OUTPATIENT
Start: 2020-09-23

## 2020-09-23 RX ORDER — FENTANYL CITRATE 50 UG/ML
INJECTION, SOLUTION INTRAMUSCULAR; INTRAVENOUS PRN
Status: DISCONTINUED | OUTPATIENT
Start: 2020-09-23 | End: 2020-09-23

## 2020-09-23 RX ORDER — SODIUM CHLORIDE, SODIUM LACTATE, POTASSIUM CHLORIDE, CALCIUM CHLORIDE 600; 310; 30; 20 MG/100ML; MG/100ML; MG/100ML; MG/100ML
INJECTION, SOLUTION INTRAVENOUS CONTINUOUS
Status: CANCELLED | OUTPATIENT
Start: 2020-09-23

## 2020-09-23 RX ORDER — LIDOCAINE HYDROCHLORIDE 20 MG/ML
INJECTION, SOLUTION INFILTRATION; PERINEURAL PRN
Status: DISCONTINUED | OUTPATIENT
Start: 2020-09-23 | End: 2020-09-23

## 2020-09-23 RX ORDER — PROPOFOL 10 MG/ML
INJECTION, EMULSION INTRAVENOUS PRN
Status: DISCONTINUED | OUTPATIENT
Start: 2020-09-23 | End: 2020-09-23

## 2020-09-23 RX ORDER — MEPERIDINE HYDROCHLORIDE 25 MG/ML
12.5 INJECTION INTRAMUSCULAR; INTRAVENOUS; SUBCUTANEOUS
Status: CANCELLED | OUTPATIENT
Start: 2020-09-23

## 2020-09-23 RX ORDER — OXYCODONE HYDROCHLORIDE 5 MG/1
5-10 TABLET ORAL EVERY 4 HOURS PRN
Qty: 30 TABLET | Refills: 0 | Status: SHIPPED | OUTPATIENT
Start: 2020-09-23

## 2020-09-23 RX ORDER — METHYLPREDNISOLONE ACETATE 40 MG/ML
INJECTION, SUSPENSION INTRA-ARTICULAR; INTRALESIONAL; INTRAMUSCULAR; SOFT TISSUE PRN
Status: DISCONTINUED | OUTPATIENT
Start: 2020-09-23 | End: 2020-09-23 | Stop reason: HOSPADM

## 2020-09-23 RX ORDER — SODIUM CHLORIDE, SODIUM LACTATE, POTASSIUM CHLORIDE, CALCIUM CHLORIDE 600; 310; 30; 20 MG/100ML; MG/100ML; MG/100ML; MG/100ML
INJECTION, SOLUTION INTRAVENOUS CONTINUOUS PRN
Status: DISCONTINUED | OUTPATIENT
Start: 2020-09-23 | End: 2020-09-23

## 2020-09-23 RX ORDER — FENTANYL CITRATE 50 UG/ML
25-50 INJECTION, SOLUTION INTRAMUSCULAR; INTRAVENOUS EVERY 5 MIN PRN
Status: CANCELLED | OUTPATIENT
Start: 2020-09-23

## 2020-09-23 RX ORDER — GLYCOPYRROLATE 0.2 MG/ML
INJECTION, SOLUTION INTRAMUSCULAR; INTRAVENOUS PRN
Status: DISCONTINUED | OUTPATIENT
Start: 2020-09-23 | End: 2020-09-23

## 2020-09-23 RX ORDER — EPHEDRINE SULFATE 50 MG/ML
INJECTION, SOLUTION INTRAMUSCULAR; INTRAVENOUS; SUBCUTANEOUS PRN
Status: DISCONTINUED | OUTPATIENT
Start: 2020-09-23 | End: 2020-09-23

## 2020-09-23 RX ORDER — PROPOFOL 10 MG/ML
INJECTION, EMULSION INTRAVENOUS CONTINUOUS PRN
Status: DISCONTINUED | OUTPATIENT
Start: 2020-09-23 | End: 2020-09-23

## 2020-09-23 RX ORDER — ONDANSETRON 4 MG/1
4 TABLET, ORALLY DISINTEGRATING ORAL
Status: CANCELLED | OUTPATIENT
Start: 2020-09-23

## 2020-09-23 RX ADMIN — DEXAMETHASONE SODIUM PHOSPHATE 4 MG: 4 INJECTION, SOLUTION INTRA-ARTICULAR; INTRALESIONAL; INTRAMUSCULAR; INTRAVENOUS; SOFT TISSUE at 15:31

## 2020-09-23 RX ADMIN — PROPOFOL 200 MG: 10 INJECTION, EMULSION INTRAVENOUS at 15:41

## 2020-09-23 RX ADMIN — NEOSTIGMINE METHYLSULFATE 5 MG: 1 INJECTION, SOLUTION INTRAVENOUS at 16:50

## 2020-09-23 RX ADMIN — LIDOCAINE HYDROCHLORIDE 100 MG: 20 INJECTION, SOLUTION INFILTRATION; PERINEURAL at 15:41

## 2020-09-23 RX ADMIN — PROPOFOL 50 MCG/KG/MIN: 10 INJECTION, EMULSION INTRAVENOUS at 15:41

## 2020-09-23 RX ADMIN — ONDANSETRON 4 MG: 2 INJECTION INTRAMUSCULAR; INTRAVENOUS at 15:33

## 2020-09-23 RX ADMIN — HYDROMORPHONE HYDROCHLORIDE 0.5 MG: 1 INJECTION, SOLUTION INTRAMUSCULAR; INTRAVENOUS; SUBCUTANEOUS at 16:35

## 2020-09-23 RX ADMIN — ACETAMINOPHEN 975 MG: 325 TABLET, FILM COATED ORAL at 13:57

## 2020-09-23 RX ADMIN — GABAPENTIN 100 MG: 100 CAPSULE ORAL at 13:57

## 2020-09-23 RX ADMIN — CEFAZOLIN SODIUM 2 G: 2 INJECTION, SOLUTION INTRAVENOUS at 15:52

## 2020-09-23 RX ADMIN — GLYCOPYRROLATE 0.8 MG: 0.2 INJECTION, SOLUTION INTRAMUSCULAR; INTRAVENOUS at 16:50

## 2020-09-23 RX ADMIN — FENTANYL CITRATE 100 MCG: 50 INJECTION, SOLUTION INTRAMUSCULAR; INTRAVENOUS at 15:41

## 2020-09-23 RX ADMIN — MIDAZOLAM 2 MG: 1 INJECTION INTRAMUSCULAR; INTRAVENOUS at 15:31

## 2020-09-23 RX ADMIN — ROCURONIUM BROMIDE 10 MG: 10 INJECTION INTRAVENOUS at 15:59

## 2020-09-23 RX ADMIN — ROCURONIUM BROMIDE 50 MG: 10 INJECTION INTRAVENOUS at 15:41

## 2020-09-23 RX ADMIN — Medication 10 MG: at 16:22

## 2020-09-23 RX ADMIN — SODIUM CHLORIDE, POTASSIUM CHLORIDE, SODIUM LACTATE AND CALCIUM CHLORIDE: 600; 310; 30; 20 INJECTION, SOLUTION INTRAVENOUS at 15:30

## 2020-09-23 ASSESSMENT — MIFFLIN-ST. JEOR: SCORE: 1576.35

## 2020-09-23 NOTE — BRIEF OP NOTE
[facility]    Brief Operative Note    Pre-operative diagnosis: Lumbar radiculopathy [M54.16]  Post-operative diagnosis Same as pre-operative diagnosis    Procedure: Procedure(s):  Minimally Invasive Left Lumbar 4 to Lumbar 5 Lateral Recess Decompression  Surgeon: Surgeon(s) and Role:     * Rai Domínguez MD - Primary     * Gilson Boucher PA-C - Assisting  Anesthesia: General   Estimated blood loss: 25 mL  Drains: None  Specimens:   ID Type Source Tests Collected by Time Destination   1 : bone/tissue L4-L5 Other (specify in comments) Other OR DOCUMENTATION ONLY Rai Domínguez MD 9/23/2020  4:36 PM      Findings:   None.  Complications: None.  Implants: * No implants in log *      Gilson Boucher PA-C  [facility] Neurosurgery  [address] [address]    Tel [phone]  Pager [phone]

## 2020-09-23 NOTE — ANESTHESIA POSTPROCEDURE EVALUATION
Patient: Tyson Fregoso    Procedure(s):  Minimally Invasive Left Lumbar 4 to Lumbar 5 Lateral Recess Decompression    Diagnosis:Lumbar radiculopathy [M54.16]  Diagnosis Additional Information: No value filed.    Anesthesia Type:  General    Note:  Anesthesia Post Evaluation    Patient location during evaluation: PACU  Patient participation: Able to fully participate in evaluation  Level of consciousness: awake  Pain management: adequate  Airway patency: patent  Cardiovascular status: acceptable  Respiratory status: acceptable  Hydration status: acceptable  PONV: none     Anesthetic complications: None          Last vitals:  Vitals:    09/23/20 1709 09/23/20 1715 09/23/20 1730   BP: (!) 151/76 (!) 152/76 (!) 157/81   Pulse: 64 57 52   Resp: 16 12 12   Temp:  36.3  C (97.4  F)    SpO2:  100% 100%         Electronically Signed By: Alessandro Luz MD  September 23, 2020  6:04 PM

## 2020-09-23 NOTE — ANESTHESIA CARE TRANSFER NOTE
Patient: Tyson Fregoso    Procedure(s):  Minimally Invasive Left Lumbar 4 to Lumbar 5 Lateral Recess Decompression    Diagnosis: Lumbar radiculopathy [M54.16]  Diagnosis Additional Information: No value filed.    Anesthesia Type:   General     Note:  Airway :Face Mask  Patient transferred to:PACU  Handoff Report: Identifed the Patient, Identified the Reponsible Provider, Reviewed the pertinent medical history, Discussed the surgical course, Reviewed Intra-OP anesthesia mangement and issues during anesthesia, Set expectations for post-procedure period and Allowed opportunity for questions and acknowledgement of understanding      Vitals: (Last set prior to Anesthesia Care Transfer)    CRNA VITALS  9/23/2020 1637 - 9/23/2020 1712      9/23/2020             NIBP:  151/83    NIBP Mean:  103                Electronically Signed By: JOESPH Machuca CRNA  September 23, 2020  5:12 PM

## 2020-09-23 NOTE — DISCHARGE INSTRUCTIONS
Same Day Surgery Discharge Instructions for  Sedation and General Anesthesia       It's not unusual to feel dizzy, light-headed or faint for up to 24 hours after surgery or while taking pain medication.  If you have these symptoms: sit for a few minutes before standing and have someone assist you when you get up to walk or use the bathroom.      You should rest and relax for the next 24 hours. We recommend you make arrangements to have an adult stay with you for at least 24 hours after your discharge.  Avoid hazardous and strenuous activity.      DO NOT DRIVE any vehicle or operate mechanical equipment for 24 hours following the end of your surgery.  Even though you may feel normal, your reactions may be affected by the medication you have received.      Do not drink alcoholic beverages for 24 hours following surgery.       Slowly progress to your regular diet as you feel able. It's not unusual to feel nauseated and/or vomit after receiving anesthesia.  If you develop these symptoms, drink clear liquids (apple juice, ginger ale, broth, 7-up, etc. ) until you feel better.  If your nausea and vomiting persists for 24 hours, please notify your surgeon.        All narcotic pain medications, along with inactivity and anesthesia, can cause constipation. Drinking plenty of liquids and increasing fiber intake will help.      For any questions of a medical nature, call your surgeon.      Do not make important decisions for 24 hours.      If you had general anesthesia, you may have a sore throat for a couple of days related to the breathing tube used during surgery.  You may use Cepacol lozenges to help with this discomfort.  If it worsens or if you develop a fever, contact your surgeon.       If you feel your pain is not well managed with the pain medications prescribed by your surgeon, please contact your surgeon's office to let them know so they can address your concerns.       CoVid 19 Information    We want to give you  information regarding Covid. Please consult your primary care provider with any questions you might have.     Patient who have symptoms (cough, fever, or shortness of breath), need to isolate for 7 days from when symptoms started OR 72 hours after fever resolves (without fever reducing medications) AND improvement of respiratory symptoms (whichever is longer).      Isolate yourself at home (in own room/own bathroom if possible)    Do Not allow any visitors    Do Not go to work or school    Do Not go to [employer],  centers, shopping, or other public places.    Do Not shake hands.    Avoid close and intimate contact with others (hugging, kissing).    Follow CDC recommendations for household cleaning of frequently touched services.     After the initial 7 days, continue to isolate yourself from household members as much as possible. To continue decrease the risk of community spread and exposure, you and any members of your household should limit activities in public for 14 days after starting home isolation.     You can reference the following CDC link for helpful home isolation/care tips:  https://www.cdc.gov/coronavirus/2019-ncov/downloads/10Things.pdf    Protect Others:    Cover Your Mouth and Nose with a mask, disposable tissue or wash cloth to avoid spreading germs to others.    Wash your hands and face frequently with soap and water    Call Your Primary Doctor If: Breathing difficulty develops or you become worse.    For more information about COVID19 and options for caring for yourself at home, please visit the CDC website at https://www.cdc.gov/coronavirus/2019-ncov/about/steps-when-sick.html  For more options for care at [facility], please visit our website at [email or web address]        Today you were given 975 mg of Tylenol at 2:00 p.m. The recommended daily maximum dose is 4000 mg.           [facility]  Dr. Sang Luo  Instructions Following Spine Surgery  [phone]  Monday - Friday; 8:00 AM - 4:00 PM    In General:   After you have had surgery on your spine, remember do not twist, or excessively flex or extend the area that you had surgery.  These activities can prevent healing.  Pain is normal and to be expected following surgery.  Please call our office to schedule your appointment follow up appointment.      Bowel Care:  Many people have constipation (hard stools) after surgery.  To help prevent constipation: Drink plenty of fluid (8-10 glasses/day); Eat more fiber, such as whole grain bread, bran cereal, and fruits and vegetables; Stay active by walking; Over the counter stool softener may also help.      Medications:  Spine surgery and pain management is unique to all patients.  You will generally be given medications for pain, muscle spasms or tightness, and for constipation during the immediate post op period.  It is important that you use these as prescribed.  Please remember to bring your pill bottles to all of your appointments. Avoid driving while taking narcotic pain medications.  Avoid alcoholic beverages while taking narcotic pain medications. You can use ice to areas of pain as needed, 20 minutes at a time.  Changing positions and walking will help loosen your muscles as well.  No NSAIDs (Ibuprofen, Advil, Motrin, Aleve, Naproxen) for 7 days.    Driving:  No driving while on narcotic pain medications.  It is state law not to drive while under the influence of a drug to a degree which renders you incapable of safely driving.  The narcotic medication you will be taking after surgery falls under this category.     Activity:   After surgery, most people feel less pain than they have had in a long time.  Walking and light activities will help you regain the use of your muscles.  You are encouraged to walk: start with short walks 5-10 minutes at a time for 4-5 times per day and increase as tolerated.  Stair climbing  as tolerated, we recommend you use the railing. No lifting greater than 10 pounds: approximately equal to one gallon of milk. No twisting, bending in the area you have had surgery. No housework, vacuuming, laundry, leaf raking, lawn mowing, or snow removal. Wear your brace (if ordered) as directed.    Showers:  If you have sutures or staples you may shower two days after surgery. It is ok to let water run over your incision but do not touch or scrub on the incision. Pat dry immediately after showering. If there is a dressing in place, you may remove it 2 days after surgery. If you were closed with Derma young (glue), you may shower without covering the incision. No baths, hot tubs, or pool activity for at least 6 weeks.     Nutrition:  In general, your diet restrictions will not change with your surgery.  You may need to eat small frequent meals initially until your appetite returns.  Eat plenty of high fiber foods and drink plenty of fluids. If you do not have a fluid restriction from or prior to surgery, we recommend 6-8 (8oz) glasses of water per day. Other fluids are fine, but water is best. Nausea is not uncommon; it is a common side effect to many pain medications.  We recommend that you take the pain medications with food, if this does not improve your symptoms, please call us.     Smoking:  For proper healing it is required that you quit using all tobacco products.  This includes smoking, chewing, nicotine gums, and nicotine patches.  Follow-Up Appointment  Neurosurgery Appointment with Regina Lipscomb PA-C or Gilson Boucher PA-C at the clinic in 6 weeks.  Call [phone] for appointment.  Call Dr. Domínguez at [phone] if these occur: Drainage from your incision, increased pain/redness/swelling, temperatures greater than 101.5, increased leg pain or swelling or unrelieved headaches    Go to the nearest Emergency Room if you experience: chest pain, shortness of breath, neck swelling or swallowing problems

## 2020-09-23 NOTE — ANESTHESIA PREPROCEDURE EVALUATION
Anesthesia Pre-Procedure Evaluation    Patient: Tyson Fregoso   MRN: [id number] : 1950          Preoperative Diagnosis: Lumbar radiculopathy [M54.16]    Procedure(s):  Minimally Invasive Left Lumbar 4 to Lumbar 5 Lateral Recess Decompression    No past medical history on file.  Past Surgical History:   Procedure Laterality Date     ARTHROPLASTY HIP Left 2020    Procedure: ARTHROPLASTY, HIP, TOTAL LEFT;  Surgeon: Logan Waters MD;  [facility] OR     INJECTION, ANESTHETIC/STEROID, TRANSFORAMINAL EPIDURAL; LUMBAR/SACRAL, SINGLE LEVEL  2019     LAMINECTOMY LUMBAR TWO LEVELS N/A 2020    Procedure: Lumbar 3 to 4 decompression and discectomy and left Lumbar 4-5 Hemilaminectomy;  Surgeon: Jan Ward MD;  [facility]: [facility] OR     microdiscectomy      L4-5     SHOULDER SURGERY Bilateral     arthroscopies (Left , right )       Anesthesia Evaluation     . Pt has had prior anesthetic. Type: General ([facility])           ROS/MED HX    ENT/Pulmonary:  - neg pulmonary ROS    (-) sleep apnea   Neurologic: Comment: Spinal stenosis with radiculopathy s/p L3-4 discectomy and left L4-5 hemilaminectomy    (+)neuropathy    : LLE pain.   Cardiovascular:  - neg cardiovascular ROS   (+) ----. : . . . :. . Previous cardiac testing date:results:date: results:ECG reviewed date:2020 results:SB at 52 bpm date: results:          METS/Exercise Tolerance:     Hematologic:         Musculoskeletal:   (+) arthritis,  -       GI/Hepatic:  - neg GI/hepatic ROS      (-) GERD   Renal/Genitourinary:  - ROS Renal section negative       Endo:  - neg endo ROS       Psychiatric:         Infectious Disease:         Malignancy:         Other:                          Physical Exam  Normal systems: dental    Airway   Mallampati: II  TM distance: >3 FB  Neck ROM: full    Dental     Cardiovascular   Rhythm and rate: regular      Pulmonary    breath sounds clear to auscultation            Lab Results    Component Value Date    WBC 5.6 12/10/2019    HGB 11.0 (L) 06/19/2020    HCT 43.2 12/10/2019     12/10/2019     12/10/2019    POTASSIUM 4.4 12/10/2019    CHLORIDE 106 12/10/2019    CO2 29 12/10/2019    BUN 25 12/10/2019    CR 0.93 12/10/2019     (H) 06/18/2020    MARCO 9.2 12/10/2019       Preop Vitals  BP Readings from Last 3 Encounters:   09/14/20 (!) 160/77   08/24/20 (!) 146/80   08/13/20 (!) 186/97    Pulse Readings from Last 3 Encounters:   09/14/20 65   08/24/20 (!) 49   08/13/20 59      Resp Readings from Last 3 Encounters:   08/24/20 16   08/13/20 16   06/19/20 16    SpO2 Readings from Last 3 Encounters:   09/14/20 99%   08/24/20 99%   08/13/20 98%      Temp Readings from Last 1 Encounters:   09/14/20 36.6  C (97.8  F)    Ht Readings from Last 1 Encounters:   09/14/20 1.829 m (6')      Wt Readings from Last 1 Encounters:   09/14/20 78.9 kg (174 lb)    Estimated body mass index is 23.6 kg/m  as calculated from the following:    Height as of 9/14/20: 1.829 m (6').    Weight as of 9/14/20: 78.9 kg (174 lb).       Anesthesia Plan      History & Physical Review  History and physical reviewed and following examination; no interval change.    ASA Status:  2 .    NPO Status:  > 8 hours    Plan for General (ETT) with Intravenous and Propofol induction. Maintenance will be Balanced.    PONV prophylaxis:  Ondansetron (or other 5HT-3)  Low dose propofol infusion for PONV prophylaxis (20-30 mcg/kg/min)          Postoperative Care  Postoperative pain management:  Multi-modal analgesia.      Consents  Anesthetic plan, risks, benefits and alternatives discussed with:  Patient..                 Osei Badillo MD

## 2020-09-23 NOTE — OP NOTE
Date of surgery: 9/23/2020    Surgeon: Rai Domínguez MD  Assistant: LAUREEN Grant  Note: Gilson Boucher was present for and assisted with the entire surgery, and his/her role as an assistant was crucial for aid in positioning, exposure, suctioning, retraction, and closure    Preoperative diagnosis: Lumbar radiculopathy  Postoperative diagnosis: Lumbar radiculopathy    Procedure:  1.  Left L4-5 MIS laminotomies, medial facetectomy, and foraminotomy for decompression of lateral recess stenosis  2.  Use of [employer] Metr[employer]ift Card Impressions minimally invasive system  3.  Use of intraoperative microscope and fluoroscopy    EBL: 25 mL    Indications: 70 year-old male with multiple prior lumbar decompression who presented with severe leg pain and imaging showing left L4-5 facet hypertrophy and severe lateral recess stenosis.  Underwent non-operative management with failure to improve.  Risks, benefits, indications, and alternatives were discussed with the patient and family in detail, and they wished to proceed.    Description of surgery: The patient was positioned prone.  Sterile prepping and draping procedures were performed.  Antibiotics were administered and timeout was performed.  A paramedian lumbar incision was performed.  The minimally invasive dilating system was used to dock on the hemilamina.  L4-5 laminotomies and medial facetectomy were performed with the high speed drill.  The kerrison rongeur was used to remove the ligamentum flavum and perform foramintomy, and the thecal sac and nerve root were exposed.  The nerve root was retracted medially, and bulging disc was removed with the pituitary rongeurs.  Hemostasis was achieved and antibiotic irrigation was performed.  The fascia was closed with 0-Vicryl sutures, and the dermal layer was closed with 2-0 vicryl sutures.  The skin was closed with a running subcuticular stitch.  There were no intraprocedural complications.

## 2020-09-24 ENCOUNTER — TELEPHONE (OUTPATIENT)
Dept: NEUROSURGERY | Facility: CLINIC | Age: 70
End: 2020-09-24

## 2020-09-24 NOTE — TELEPHONE ENCOUNTER
"Called patient for post-operative follow-up.     DOS: 9/23/2020  Procedure: Left L4-5 MIS laminotomies, medial facetectomy, and foraminotomy for decompression of lateral recess stenosis  Surgeon: Rai Domínguez MD     Patient denies having any pain at this time. He no longer has the pain in his buttock and leg. He states he is feeling \"really good.\" He took a tab of Oxycodone last night to prevent waking up to pain. We discussed that the pain he had prior to surgery can come and go as he is healing. He still has the numbness in his toes but states it feels different than prior to surgery. He understands this will take time to heal. RN encouraged him to ice frequently throughout the day for no longer than 20-30 minutes at a time. Also discussed that he could incorporate Tylenol into his regimen.     Patient is walking without difficulty. Encouraged short, frequent walks as tolerated.     Patient has not had a bowel movement since surgery. Discussed reasons of constipation after surgery and reinforced treatment options. Denies bladder problems.     Patient had the opportunity for questions to be answered. He will call with any questions or concerns.     "

## 2020-10-07 ENCOUNTER — OFFICE VISIT (OUTPATIENT)
Dept: ORTHOPEDICS | Facility: CLINIC | Age: 70
End: 2020-10-07
Payer: COMMERCIAL

## 2020-10-07 DIAGNOSIS — Z96.642 HISTORY OF LEFT HIP REPLACEMENT: Primary | ICD-10-CM

## 2020-10-07 PROCEDURE — 99212 OFFICE O/P EST SF 10 MIN: CPT | Performed by: ORTHOPAEDIC SURGERY

## 2020-10-07 NOTE — LETTER
10/7/2020         RE: Tyson Fregoso  [address]  [place] [address]        Dear Colleague,    Thank you for referring your patient, Tyson Fregoso, to the [facility] [place]. Please see a copy of my visit note below.           Interval History:   Tyson Fregoso is a 70 year old male who is here follow up for:   Left MAXIME - 6/18/2020    Rubén returns today for follow-up following the left total hip replacement.  He notes that the hip has been doing very well.  He was having ongoing sciatica issues and pain radiating down his leg to his foot.  He eventually underwent a microdiscectomy for this.  He has been doing extremely well and has great improvement in his current leg pain.  He has not had any problems with the left hip incision.         Physical Exam:     NAD  AOx3  Interactive and cooperative with the exam.  He walks with a normal gait.  Is no pain with left hip range of motion.  Ankle plantar flexion dorsiflexion are intact.       Imaging:      Prior imaging demonstrates maintained hardware and implant alignment and symmetric leg lengths.       Assessment and Plan:        Tyson Fregoso is a 70 year old male is s/p the above procedure.    He is doing very well following the hip replacement and is not had any issues over the last few weeks.  He can continue to progress his activities as tolerated with no restrictions or precautions.  I would see him back in clinic for routine one-year follow-up visit.  He will let me know if he has any questions or concerns in the meantime.      Logan Waters M.D.     Arthritis and Joint Replacement  Department of Orthopaedic Surgery, [facility]  [email or web address]  [phone] (pager)

## 2020-10-07 NOTE — PROGRESS NOTES
Interval History:   Tyson Fregoso is a 70 year old male who is here follow up for:   Left MAXIME - 6/18/2020    Rubén returns today for follow-up following the left total hip replacement.  He notes that the hip has been doing very well.  He was having ongoing sciatica issues and pain radiating down his leg to his foot.  He eventually underwent a microdiscectomy for this.  He has been doing extremely well and has great improvement in his current leg pain.  He has not had any problems with the left hip incision.         Physical Exam:     NAD  AOx3  Interactive and cooperative with the exam.  He walks with a normal gait.  Is no pain with left hip range of motion.  Ankle plantar flexion dorsiflexion are intact.       Imaging:      Prior imaging demonstrates maintained hardware and implant alignment and symmetric leg lengths.       Assessment and Plan:        Tyson Fregoso is a 70 year old male is s/p the above procedure.    He is doing very well following the hip replacement and is not had any issues over the last few weeks.  He can continue to progress his activities as tolerated with no restrictions or precautions.  I would see him back in clinic for routine one-year follow-up visit.  He will let me know if he has any questions or concerns in the meantime.      Logan Waters M.D.     Arthritis and Joint Replacement  Department of Orthopaedic Surgery, [facility]  [email or web address]  [phone] (pager)

## 2020-10-09 ENCOUNTER — VIRTUAL VISIT (OUTPATIENT)
Dept: NEUROSURGERY | Facility: CLINIC | Age: 70
End: 2020-10-09
Payer: COMMERCIAL

## 2020-10-09 DIAGNOSIS — Z98.890 STATUS POST LUMBAR SPINE SURGERY FOR DECOMPRESSION OF SPINAL CORD: Primary | ICD-10-CM

## 2020-10-09 PROCEDURE — 99207 PR NO CHARGE NURSE ONLY: CPT

## 2020-10-09 NOTE — NURSING NOTE
October 9, 2020 10:21 AM   Tyson Fregoso is a 70 year old male who presents for:    No chief complaint on file.    Initial Vitals: There were no vitals taken for this visit. Estimated body mass index is 23.57 kg/m  as calculated from the following:    Height as of 9/23/20: 6' (1.829 m).    Weight as of 9/23/20: 173 lb 12.8 oz (78.8 kg). There is no height or weight on file to calculate BSA.  Data Unavailable Comment: Data Unavailable     Pharmacy name entered into [facility]: [id number] - [place], [place] - [address]    Clinical concerns: Tyson Fregoso reports not taking any opioids.  Marry Browning MA

## 2020-10-09 NOTE — PROGRESS NOTES
"Calling patient for 2 week follow up visit. He is s/p Minimally Invasive Left Lumbar 4 to Lumbar 5 Lateral Recess Decompression by Dr. Domínguez on 09/23/2020.     Pain  Patient presents today s/p Minimally Invasive Left Lumbar 4 to Lumbar 5 Lateral Recess Decompression  per the order of Dr. Domínguez on 09/23/2020. Pain rates 0/10 to because relaxing in chair. This AM and the last few nights the \"whole area of the back\" was sore- 4/10.  Just finished icing and pain has resolved. Taking Tylenol for pain control- 4-5 times per day. Discussed with him ok to take NSAIDs at this point of recovery  He has a back brace he said he was told by our team that he could wear it for comfort. He may try it at night to see if this helps.  Incision  Wound is healing nicely without erythema, fluctuation, induration, drainage, or fever. Incision edges well approximated without signs of infection.   Assessment/Patient Questions   Denies numbness/tingling to bilateral lower extremities. Denies weakness. Able to dorsi-flex and plantar flex with equal strength.  All questions answered.  He states in his toes same to preoperative symptoms- if he rubs the top of his foot it feels \"funny\", not numb but on the verge of it.     No other questions or concerns. He is aware to follow post operative restrictions and aware of his follow up appointment with the provider.       "

## 2020-10-13 ENCOUNTER — NURSE TRIAGE (OUTPATIENT)
Dept: NURSING | Facility: CLINIC | Age: 70
End: 2020-10-13

## 2020-10-13 ENCOUNTER — TELEPHONE (OUTPATIENT)
Dept: NEUROSURGERY | Facility: CLINIC | Age: 70
End: 2020-10-13

## 2020-10-13 DIAGNOSIS — Z98.890 STATUS POST LUMBAR SPINE SURGERY FOR DECOMPRESSION OF SPINAL CORD: Primary | ICD-10-CM

## 2020-10-13 NOTE — TELEPHONE ENCOUNTER
DOS: 9/23/2020  Procedure: Left L4,5 MIS laminotomies, medial facetectomy, and foraminotomy for decompression of lateral recess stenosis   Surgeon: Rai Domínguez MD     Patient complains of low back pain that radiates into his left buttock down to his ankle has worsened the last two days. He mentioned pain is not as bad as before surgery but since it is different he is concerned. He did not do anything out of the norm. He is taking Aleve and Tylenol on average two times a day with minimal relief. He is also icing frequently throughout the day. He states sitting makes the pain lessen and standing or walking makes the pain worse. He would like to know what he should do next.

## 2020-10-13 NOTE — TELEPHONE ENCOUNTER
HA Short, recommends a Medrol dosepak if patient has not had one. Patient has had them in the past but has not had one since surgery. He was in agreement to trying it to see if it will help.     Medication pended for signature. Pharmacy verified. Patient will call us with updates. He also understands for unbearable pain that he can seek care at urgent care/emergency department.

## 2020-10-13 NOTE — TELEPHONE ENCOUNTER
Pt called in states he is waiting for the methylPREDNISolone (MEDROL DOSEPAK) 4 MG tablet therapy pack.   Inform the Pt we will send message to the clinic.  Pt verbalized understand.      Please advise.      Dorian Rush Nurse Advisor 10/13/2020 5:26 PM

## 2020-10-13 NOTE — TELEPHONE ENCOUNTER
10-13-20 at 1156  Pt is having more pain today. Unable to walk. Needs to call back at [phone] for advice

## 2020-10-14 RX ORDER — METHYLPREDNISOLONE 4 MG
TABLET, DOSE PACK ORAL
Qty: 21 TABLET | Refills: 0 | Status: SHIPPED | OUTPATIENT
Start: 2020-10-14

## 2020-10-14 NOTE — TELEPHONE ENCOUNTER
"10/14/20 at 08:42am  Wife called and said\" still need prescription for steroid sending to [facility]. I was told that waiting for approval. Wants to call for update.    "

## 2020-10-20 ENCOUNTER — TELEPHONE (OUTPATIENT)
Dept: NEUROSURGERY | Facility: OTHER | Age: 70
End: 2020-10-20

## 2020-10-20 NOTE — TELEPHONE ENCOUNTER
Patient was prescribed MDP on 10/13. He is s/p Left L4,5 MIS laminotomies, medial facetectomy, and foraminotomy for decompression of lateral recess stenosis  With Dr. Domínguez on 9/23.     He states that since he did the MDP the pain is slowly improving but he is still having pain in the lower left back that radiates to the left ankle. It is a constant pain that is made worse with walking. Sitting helps the pain. He is taking Aleve and Tylenol. Informed him that he should also utilize ice and heat. He is wondering if he can use his stationary bike. Will route to care team.

## 2020-10-20 NOTE — TELEPHONE ENCOUNTER
Per Liz Hoang CNP: I would probably wait to use the bike until he's seen for his 6 week visit. I wouldn't want it to flare up his pain more. He should follow the 10-15 lbs lifting restriction and limited bending/twisting as well. If his pain is slowly improving, then okay to continue to monitor and follow routine post op plan. If it's bothersome for him, he can come into clinic sooner as an option too.     Called and spoke to the patient. He is going to monitor his symptoms and let us know if he would like to come in and be seen sooner.

## 2020-10-20 NOTE — TELEPHONE ENCOUNTER
Patient calling stating that prednisone is not working. Please call the patient back @ [phone]

## 2020-11-16 ENCOUNTER — VIRTUAL VISIT (OUTPATIENT)
Dept: NEUROSURGERY | Facility: CLINIC | Age: 70
End: 2020-11-16
Attending: NURSE PRACTITIONER
Payer: COMMERCIAL

## 2020-11-16 DIAGNOSIS — Z98.890 STATUS POST LUMBAR SPINE SURGERY FOR DECOMPRESSION OF SPINAL CORD: Primary | ICD-10-CM

## 2020-11-16 PROCEDURE — 99024 POSTOP FOLLOW-UP VISIT: CPT | Performed by: NURSE PRACTITIONER

## 2020-11-16 NOTE — PATIENT INSTRUCTIONS
- May increase lifting restriction to 20-25 pounds and gradually increase as tolerated.  - Follow up in 4-6 weeks.   - Call the clinic at [phone] for increased pain or any other questions and concerns.

## 2020-11-16 NOTE — PROGRESS NOTES
"Tyson Fregoso is a 70 year old male who is being evaluated via a billable telephone visit.      The patient has been notified of following:     \"This telephone visit will be conducted via a call between you and your physician/provider. We have found that certain health care needs can be provided without the need for a physical exam.  This service lets us provide the care you need with a short phone conversation.  If a prescription is necessary we can send it directly to your pharmacy.  If lab work is needed we can place an order for that and you can then stop by our lab to have the test done at a later time.    Telephone visits are billed at different rates depending on your insurance coverage. During this emergency period, for some insurers they may be billed the same as an in-person visit.  Please reach out to your insurance provider with any questions.    If during the course of the call the physician/provider feels a telephone visit is not appropriate, you will not be charged for this service.\"    Patient has given verbal consent for Telephone visit?  Yes    What phone number would you like to be contacted at? [phone]    How would you like to obtain your AVS? Mail a copy    HPI: Tyson Fregoso is a 70 year old male almost 8 weeks s/p minimally Invasive Left Lumbar 4 to Lumbar 5 Lateral Recess Decompression by Dr. Domínguez on 09/23/2020. Today he reports improvement in back and left hip pain.He continues to have left 2nd toe numbness which is unchanged. He denies new or worsening weakness. He denies concerns about his incision. He is anxious to increase activities and include stretches.    Medical, surgical, family, and social history unchanged since prior exam.    ROS: 10 point ROS neg other than the symptoms noted above in the HPI.    Assessment/Plan:  S/p left L4-5 lateral recess decompression    Patient Instructions   - May increase lifting restriction to 20-25 pounds and gradually increase as " tolerated.  - Follow up in 4-6 weeks.   - Call the clinic at [phone] for increased pain or any other questions and concerns.    Phone call duration: 11 minutes    Deja Munoz [address] [address]  Tel [phone]  Fax [phone]

## 2020-11-16 NOTE — LETTER
"    11/16/2020         RE: Tyson Fregoso  [address]  [place] [address]        Dear Colleague,    Thank you for referring your patient, Tyson Fregoso, to the [facility] [place]. Please see a copy of my visit note below.    Tyson Fregoso is a 70 year old male who is being evaluated via a billable telephone visit.      The patient has been notified of following:     \"This telephone visit will be conducted via a call between you and your physician/provider. We have found that certain health care needs can be provided without the need for a physical exam.  This service lets us provide the care you need with a short phone conversation.  If a prescription is necessary we can send it directly to your pharmacy.  If lab work is needed we can place an order for that and you can then stop by our lab to have the test done at a later time.    Telephone visits are billed at different rates depending on your insurance coverage. During this emergency period, for some insurers they may be billed the same as an in-person visit.  Please reach out to your insurance provider with any questions.    If during the course of the call the physician/provider feels a telephone visit is not appropriate, you will not be charged for this service.\"    Patient has given verbal consent for Telephone visit?  Yes    What phone number would you like to be contacted at? [phone]    How would you like to obtain your AVS? Mail a copy    HPI: Tyson Fregoso is a 70 year old male almost 8 weeks s/p minimally Invasive Left Lumbar 4 to Lumbar 5 Lateral Recess Decompression by Dr. Domínguez on 09/23/2020. Today he reports improvement in back and left hip pain.He continues to have left 2nd toe numbness which is unchanged. He denies new or worsening weakness. He denies concerns about his incision. He is anxious to increase activities and include stretches.    Medical, surgical, family, and social " history unchanged since prior exam.    ROS: 10 point ROS neg other than the symptoms noted above in the HPI.    Assessment/Plan:  S/p left L4-5 lateral recess decompression    Patient Instructions   - May increase lifting restriction to 20-25 pounds and gradually increase as tolerated.  - Follow up in 4-6 weeks.   - Call the clinic at [phone] for increased pain or any other questions and concerns.    Phone call duration: 11 minutes    Deja Munoz CNP  [facility]  [address] [place] [address]  Tel [phone]  Fax [phone]            Again, thank you for allowing me to participate in the care of your patient.        Sincerely,        Deja Munoz, HA

## 2020-12-16 ENCOUNTER — VIRTUAL VISIT (OUTPATIENT)
Dept: NEUROSURGERY | Facility: CLINIC | Age: 70
End: 2020-12-16
Attending: NURSE PRACTITIONER
Payer: COMMERCIAL

## 2020-12-16 VITALS — WEIGHT: 175 LBS | BODY MASS INDEX: 23.7 KG/M2 | HEIGHT: 72 IN

## 2020-12-16 DIAGNOSIS — Z98.890 STATUS POST LUMBAR SPINE SURGERY FOR DECOMPRESSION OF SPINAL CORD: Primary | ICD-10-CM

## 2020-12-16 PROCEDURE — 99024 POSTOP FOLLOW-UP VISIT: CPT | Mod: TEL | Performed by: NURSE PRACTITIONER

## 2020-12-16 RX ORDER — MULTIVIT-MIN/IRON/FOLIC ACID/K 18-600-40
CAPSULE ORAL
COMMUNITY

## 2020-12-16 ASSESSMENT — PAIN SCALES - GENERAL: PAINLEVEL: NO PAIN (1)

## 2020-12-16 ASSESSMENT — MIFFLIN-ST. JEOR: SCORE: 1581.79

## 2020-12-16 NOTE — NURSING NOTE
"Tyson Fregoso is a 70 year old male who is being evaluated via a billable telephone visit.      The patient has been notified of following:     \"This telephone visit will be conducted via a call between you and your physician/provider. We have found that certain health care needs can be provided without the need for a physical exam.  This service lets us provide the care you need with a short phone conversation.  If a prescription is necessary we can send it directly to your pharmacy.  If lab work is needed we can place an order for that and you can then stop by our lab to have the test done at a later time.    Telephone visits are billed at different rates depending on your insurance coverage. During this emergency period, for some insurers they may be billed the same as an in-person visit.  Please reach out to your insurance provider with any questions.    If during the course of the call the physician/provider feels a telephone visit is not appropriate, you will not be charged for this service.\"    Patient has given verbal consent for Telephone visit?  Yes    What phone number would you like to be contacted at? [phone]    How would you like to obtain your AVS? Mail a copy    Aram Cullen CMA on 12/16/2020 at 8:42 AM      "

## 2020-12-16 NOTE — PROGRESS NOTES
HPI: Tyson Fregoso is a 70 year old male 12 weeks s/p MIS left L4-5 lateral recess decompression. Doing well. Notes some back soreness/stiffness in the morning. Continues to have numbness in his left 1st-3rd toes, unchanged. No overt weakness. Continues to be unable to put sock on his left foot. He states this is due to his left hip flexibility s/p left MAXIME in June 2020.       Medical, surgical, family, and social history unchanged since prior exam.    ROS: 10 point ROS neg other than the symptoms noted above in the HPI.    Assessment/Plan:  S/p MIS left L4-5 lateral recess decompression    Patient Instructions   - Physical therapy ordered. They will contact you to schedule.  - Increase activity as tolerated.  - Follow up as needed.   - Call the clinic at [phone] for increased pain or any other questions and concerns.    Deja Munoz, [facility]  [address] [place] [address]  Tel [phone]  Fax [phone]

## 2020-12-16 NOTE — PATIENT INSTRUCTIONS
- Physical therapy ordered. They will contact you to schedule.  - Increase activity as tolerated.  - Follow up as needed.   - Call the clinic at [phone] for increased pain or any other questions and concerns.

## 2020-12-18 ENCOUNTER — THERAPY VISIT (OUTPATIENT)
Dept: PHYSICAL THERAPY | Facility: CLINIC | Age: 70
End: 2020-12-18
Attending: NURSE PRACTITIONER
Payer: COMMERCIAL

## 2020-12-18 DIAGNOSIS — Z98.890 STATUS POST LUMBAR SPINE SURGERY FOR DECOMPRESSION OF SPINAL CORD: Primary | ICD-10-CM

## 2020-12-18 DIAGNOSIS — M25.552 HIP PAIN, LEFT: ICD-10-CM

## 2020-12-18 PROCEDURE — 97162 PT EVAL MOD COMPLEX 30 MIN: CPT | Mod: GP | Performed by: PHYSICAL THERAPIST

## 2020-12-18 PROCEDURE — 97110 THERAPEUTIC EXERCISES: CPT | Mod: GP | Performed by: PHYSICAL THERAPIST

## 2020-12-18 PROCEDURE — 97112 NEUROMUSCULAR REEDUCATION: CPT | Mod: GP | Performed by: PHYSICAL THERAPIST

## 2020-12-18 NOTE — PROGRESS NOTES
[facility] Initial Evaluation  Subjective:  The history is provided by the patient. No  was used.   Patient Health History  Tyson Fregoso being seen for Two back surgeries this year, first in January and apparently the surgeon wasn't able to do everything that he had intended.  Pt describes the first procedure as a discectomy it seems and then he wasn't able to do some additional decompression that needed work as well lower down his spine.  Pt then underwent a L MAXIME in June of 2020, pt really started to struggle around 5 weeks after that surgery.  Struggling with pain radiating down his leg.  Pt then went for a decompression of L4-5 in September of 2020.   .          Pain is reported as 2/10 on pain scale.  General health as reported by patient is good.  Pertinent medical history includes: none.   Red flags:  None as reported by patient.         Current medications:  None.    Current occupation is retired.                     Therapist Generated HPI Evaluation  Problem details: Pt is overall doing well all things considered, however he is struggling with some ADL's.  Pt doesn't have any hip precautions at this point he states. .             Condition occurred with:  Insidious onset.  Where condition occurred: for unknown reasons.    Pain is described as aching and is constant.  Pain radiates to:  Gluteals left and lower leg left. Pain is the same all the time.  Since onset symptoms are gradually improving.  Associated symptoms:  Loss of motion/stiffness. Symptoms are exacerbated by bending (putting on shoes/socks)  and relieved by rest.    Previous treatment includes surgery. There was significant improvement following previous treatment.  Barriers include:  None as reported by patient.                        Objective:  Standing Alignment:        Lumbar:  Lordosis decr (forward trunk lean)                Flexibility/Screens:       Lower Extremity:  Decreased left lower  extremity flexibility:Piriformis and Hip Flexors    Decreased right lower extremity flexibility:  Hip Flexors                                                 Hip Evaluation  HIP AROM:    Flexion: Left: 110   Right:     Extension: Left: -5   Right:       Internal Rotation: Left: NT   Right:  External Rotation: Left: 15   Right:        Hip Strength:    Flexion:   Left: 4+/5   Pain:                      Extension:  Left: 4/5  Pain:  Abduction:  Left: 4/5     Pain:                                 General     ROS    Assessment/Plan:    Patient is a 70 year old male with lumbar and left side hip complaints.    Patient has the following significant findings with corresponding treatment plan.                Diagnosis 1:  S/p lumbar decompression, L MAXIME, L microdiscectomy      Pain -  hot/cold therapy, manual therapy, self management, education and home program  Decreased ROM/flexibility - manual therapy and therapeutic exercise  Decreased strength - therapeutic exercise and therapeutic activities  Impaired gait - gait training  Impaired muscle performance - neuro re-education  Decreased function - therapeutic activities  Impaired posture - neuro re-education    Therapy Evaluation Codes:   1) History comprised of:   Personal factors that impact the plan of care:      Age, Past/current experiences and Time since onset of symptoms.    Comorbidity factors that impact the plan of care are:      None.     Medications impacting care: None.  2) Examination of Body Systems comprised of:   Body structures and functions that impact the plan of care:      Hip and Lumbar spine.   Activity limitations that impact the plan of care are:      Bending, Dressing, Lifting, Sitting, Squatting/kneeling, Stairs and Walking.  3) Clinical presentation characteristics are:   Evolving/Changing.  4) Decision-Making    Moderate complexity using standardized patient assessment instrument and/or measureable assessment of functional outcome.  Cumulative  Therapy Evaluation is: Moderate complexity.    Previous and current functional limitations:  (See Goal Flow Sheet for this information)    Short term and Long term goals: (See Goal Flow Sheet for this information)     Communication ability:  Patient appears to be able to clearly communicate and understand verbal and written communication and follow directions correctly.  Treatment Explanation - The following has been discussed with the patient:   RX ordered/plan of care  Anticipated outcomes  Possible risks and side effects  This patient would benefit from PT intervention to resume normal activities.   Rehab potential is good.    Frequency:  1 X week, once daily  Duration:  for 6 weeks  Discharge Plan:  Achieve all LTG.  Independent in home treatment program.  Reach maximal therapeutic benefit.    Please refer to the daily flowsheet for treatment today, total treatment time and time spent performing 1:1 timed codes.

## 2020-12-29 ENCOUNTER — THERAPY VISIT (OUTPATIENT)
Dept: PHYSICAL THERAPY | Facility: CLINIC | Age: 70
End: 2020-12-29
Payer: COMMERCIAL

## 2020-12-29 DIAGNOSIS — M25.552 HIP PAIN, LEFT: ICD-10-CM

## 2020-12-29 DIAGNOSIS — Z98.890 STATUS POST LUMBAR SPINE SURGERY FOR DECOMPRESSION OF SPINAL CORD: ICD-10-CM

## 2020-12-29 PROCEDURE — 97112 NEUROMUSCULAR REEDUCATION: CPT | Mod: GP | Performed by: PHYSICAL THERAPIST

## 2020-12-29 PROCEDURE — 97110 THERAPEUTIC EXERCISES: CPT | Mod: GP | Performed by: PHYSICAL THERAPIST

## 2021-01-12 ENCOUNTER — THERAPY VISIT (OUTPATIENT)
Dept: PHYSICAL THERAPY | Facility: CLINIC | Age: 71
End: 2021-01-12
Payer: COMMERCIAL

## 2021-01-12 DIAGNOSIS — M25.552 HIP PAIN, LEFT: ICD-10-CM

## 2021-01-12 DIAGNOSIS — Z98.890 STATUS POST LUMBAR SPINE SURGERY FOR DECOMPRESSION OF SPINAL CORD: ICD-10-CM

## 2021-01-12 PROCEDURE — 97112 NEUROMUSCULAR REEDUCATION: CPT | Mod: GP | Performed by: PHYSICAL THERAPIST

## 2021-01-12 PROCEDURE — 97110 THERAPEUTIC EXERCISES: CPT | Mod: GP | Performed by: PHYSICAL THERAPIST

## 2021-01-26 ENCOUNTER — THERAPY VISIT (OUTPATIENT)
Dept: PHYSICAL THERAPY | Facility: CLINIC | Age: 71
End: 2021-01-26
Payer: COMMERCIAL

## 2021-01-26 DIAGNOSIS — M25.552 HIP PAIN, LEFT: ICD-10-CM

## 2021-01-26 DIAGNOSIS — Z98.890 STATUS POST LUMBAR SPINE SURGERY FOR DECOMPRESSION OF SPINAL CORD: ICD-10-CM

## 2021-01-26 PROCEDURE — 97112 NEUROMUSCULAR REEDUCATION: CPT | Mod: GP | Performed by: PHYSICAL THERAPIST

## 2021-01-26 PROCEDURE — 97110 THERAPEUTIC EXERCISES: CPT | Mod: GP | Performed by: PHYSICAL THERAPIST

## 2021-02-09 ENCOUNTER — THERAPY VISIT (OUTPATIENT)
Dept: PHYSICAL THERAPY | Facility: CLINIC | Age: 71
End: 2021-02-09
Payer: COMMERCIAL

## 2021-02-09 DIAGNOSIS — Z98.890 STATUS POST LUMBAR SPINE SURGERY FOR DECOMPRESSION OF SPINAL CORD: ICD-10-CM

## 2021-02-09 DIAGNOSIS — M25.552 HIP PAIN, LEFT: ICD-10-CM

## 2021-02-09 PROCEDURE — 97110 THERAPEUTIC EXERCISES: CPT | Mod: GP | Performed by: PHYSICAL THERAPIST

## 2021-02-09 PROCEDURE — 97112 NEUROMUSCULAR REEDUCATION: CPT | Mod: GP | Performed by: PHYSICAL THERAPIST

## 2021-02-23 ENCOUNTER — THERAPY VISIT (OUTPATIENT)
Dept: PHYSICAL THERAPY | Facility: CLINIC | Age: 71
End: 2021-02-23
Payer: COMMERCIAL

## 2021-02-23 DIAGNOSIS — Z98.890 STATUS POST LUMBAR SPINE SURGERY FOR DECOMPRESSION OF SPINAL CORD: ICD-10-CM

## 2021-02-23 DIAGNOSIS — M25.552 HIP PAIN, LEFT: ICD-10-CM

## 2021-02-23 PROCEDURE — 97110 THERAPEUTIC EXERCISES: CPT | Mod: GP | Performed by: PHYSICAL THERAPIST

## 2021-05-18 PROBLEM — Z98.890 STATUS POST LUMBAR SPINE SURGERY FOR DECOMPRESSION OF SPINAL CORD: Status: RESOLVED | Noted: 2020-06-18 | Resolved: 2021-05-18

## 2021-05-18 PROBLEM — M25.552 HIP PAIN, LEFT: Status: RESOLVED | Noted: 2020-12-18 | Resolved: 2021-05-18

## 2021-05-18 NOTE — PROGRESS NOTES
Discharge Note    Progress reporting period is from initial evaluation date (please see noted date below) to Feb 23, 2021.  No linked episodes      Tyson failed to follow up and current status is unknown.  Please see information below for last relevant information on current status.  Patient seen for 6 visits.    SUBJECTIVE  Subjective changes noted by patient:  Doing well, is leaving town and very happy with how he is doing, likely last visit.  .  Current pain level is  .     Previous pain level was  3/10.   Changes in function:  Yes (See Goal flowsheet attached for changes in current functional level)  Adverse reaction to treatment or activity: None    OBJECTIVE  Changes noted in objective findings: TROM: flex wnl, ext efrain loss.  Hip flexor/adductors are still limiting and tight.  Core strenth: fair/good.     ASSESSMENT/PLAN  Diagnosis: s/p lumbar microdiscectomy January 2020, L MAXIME June 2020, L lumbar decompression September 2020   STG/LTGs have been met or progress has been made towards goals:  Yes, please see goal flowsheet for most current information  Assessment of Progress: current status is unknown.    Last current status: Pt is progressing well   Self Management Plans:  HEP  I have re-evaluated this patient and find that the nature, scope, duration and intensity of the therapy is appropriate for the medical condition of the patient.  Tyson continues to require the following intervention to meet STG and LTG's:  HEP.    Recommendations:  Discharge with current home program.  Patient to follow up with MD as needed.    Please refer to the daily flowsheet for treatment today, total treatment time and time spent performing 1:1 timed codes.

## 2023-11-13 ENCOUNTER — TELEPHONE (OUTPATIENT)
Dept: NEUROSURGERY | Facility: CLINIC | Age: 73
End: 2023-11-13
Payer: COMMERCIAL

## 2023-11-13 NOTE — TELEPHONE ENCOUNTER
Patient would like call back regarding severe lower back pain and what he should do during the time from now until he's seen in clinic on 12/15/23 (Hopefully sooner as he is on a waitlist) Please call patient back at [phone]. Thank you

## 2023-11-13 NOTE — TELEPHONE ENCOUNTER
Patient not seen in clinic since 2020. Patient should see provider as scheduled where we can obtain an updated assessment and determine next steps at that time.     Patient can use OTC pain relievers and ice/heat in the mean time. Can also contact PCP to discuss pain control if needed, ED for any severe uncontrolled pain.     Called patient to provide above update and further discuss. Reviewed ED for any red flag s/s. Patient verbalized understanding and currently on the wait list.

## 2023-11-21 ENCOUNTER — TELEPHONE (OUTPATIENT)
Dept: NEUROSURGERY | Facility: CLINIC | Age: 73
End: 2023-11-21
Payer: COMMERCIAL

## 2023-11-21 NOTE — TELEPHONE ENCOUNTER
[facility] Call Center    Phone Message    May a detailed message be left on voicemail: yes     Reason for Call: Other: Patient is wondering if he should have an MRI done before his appt with you on 12/15?  He doesn't have any recent imaging.  Please call to advise.  Thank you.     Action Taken: Other: [id number]    Travel Screening: Not Applicable

## 2023-11-21 NOTE — TELEPHONE ENCOUNTER
Patient not seen since 2020, per previous encounter patient should be evaluated by an ADELIA to determine next steps.     Called patient to provide update, he verbalized understanding.

## 2023-12-05 ENCOUNTER — OFFICE VISIT (OUTPATIENT)
Dept: NEUROSURGERY | Facility: CLINIC | Age: 73
End: 2023-12-05
Payer: COMMERCIAL

## 2023-12-05 VITALS
HEIGHT: 72 IN | HEART RATE: 50 BPM | WEIGHT: 180 LBS | SYSTOLIC BLOOD PRESSURE: 172 MMHG | OXYGEN SATURATION: 99 % | BODY MASS INDEX: 24.38 KG/M2 | DIASTOLIC BLOOD PRESSURE: 88 MMHG

## 2023-12-05 DIAGNOSIS — Z98.890 S/P LUMBAR SPINE OPERATION: Primary | ICD-10-CM

## 2023-12-05 DIAGNOSIS — M54.41 ACUTE RIGHT-SIDED LOW BACK PAIN WITH RIGHT-SIDED SCIATICA: ICD-10-CM

## 2023-12-05 PROCEDURE — 99203 OFFICE O/P NEW LOW 30 MIN: CPT | Performed by: PHYSICIAN ASSISTANT

## 2023-12-05 ASSESSMENT — PAIN SCALES - GENERAL: PAINLEVEL: EXTREME PAIN (9)

## 2023-12-05 NOTE — NURSING NOTE
Tyson Fregoso is a 73 year old male who presents for:  Chief Complaint   Patient presents with    Consult        Initial Vitals:  BP (!) 172/88   Pulse 50   Ht 6' (1.829 m)   Wt 180 lb (81.6 kg)   SpO2 99%   BMI 24.41 kg/m   Estimated body mass index is 24.41 kg/m  as calculated from the following:    Height as of this encounter: 6' (1.829 m).    Weight as of this encounter: 180 lb (81.6 kg).. Body surface area is 2.04 meters squared. BP completed using cuff size: regular  Extreme Pain (9)    Oscar Lamar

## 2023-12-05 NOTE — LETTER
12/5/2023         RE: Tyson Fregoso  [address]  [place] [address]        Dear Colleague,    Thank you for referring your patient, Tyson Fregoso, to the [facility] NEUROLOGY CLINICS [facility]. Please see a copy of my visit note below.    Neurosurgery follow-up    Mr. Fregoso is a 73-year-old male who underwent left L4-5 lateral recess decompression with Dr. Domínguez in September of 2020, he states he did very well after that surgery.  Prior to that he had had a surgery in 2001, and more recently had undergone L3-4 and left L4-5 hemilaminectomy in January 2020 with Dr. Ward.    Patient states he has done very well over the past 3 years, but over the past month he is starting to have increased back pain and pain going down his right leg, and an L4/L5 distribution.  It begins in his low back and radiates down his posterior lateral thigh and is painful in his knee..  He reports some occasional numbness and tingling in both of his feet.  He has been trying physical therapy exercises at home as directed to him previously.    Exam    B/P: 172/88, T: Data Unavailable, P: 50, R: Data Unavailable     Alert and oriented no acute distress  Bilateral lower extremities with 5/5 strength  Reflexes 2+ patella/ankle  Negative straight leg raise bilaterally  Lumbar spine nontender to palpation  Able to stand on heels and toes  Gait is normal    Imaging    No updated imaging to review    Assessment    History of prior lumbar surgeries  Right-sided low back pain  Right leg pain      Plan    I recommend the patient updated lumbar MRI with and without contrast I will contact him with the results when they are available.  He can continue doing his home physical therapy exercises as tolerated.          Again, thank you for allowing me to participate in the care of your patient.        Sincerely,        Nic Boyle PA-C

## 2023-12-05 NOTE — PROGRESS NOTES
Neurosurgery follow-up    Mr. Fregoso is a 73-year-old male who underwent left L4-5 lateral recess decompression with Dr. Domínguez in September of 2020, he states he did very well after that surgery.  Prior to that he had had a surgery in 2001, and more recently had undergone L3-4 and left L4-5 hemilaminectomy in January 2020 with Dr. Ward.    Patient states he has done very well over the past 3 years, but over the past month he is starting to have increased back pain and pain going down his right leg, and an L4/L5 distribution.  It begins in his low back and radiates down his posterior lateral thigh and is painful in his knee..  He reports some occasional numbness and tingling in both of his feet.  He has been trying physical therapy exercises at home as directed to him previously.    Exam    B/P: 172/88, T: Data Unavailable, P: 50, R: Data Unavailable     Alert and oriented no acute distress  Bilateral lower extremities with 5/5 strength  Reflexes 2+ patella/ankle  Negative straight leg raise bilaterally  Lumbar spine nontender to palpation  Able to stand on heels and toes  Gait is normal    Imaging    No updated imaging to review    Assessment    History of prior lumbar surgeries  Right-sided low back pain  Right leg pain      Plan    I recommend the patient updated lumbar MRI with and without contrast I will contact him with the results when they are available.  He can continue doing his home physical therapy exercises as tolerated.    Addendum 12/8/23    Lumbar MRI reviewed, results below, patient states he has not had good results with injections in the past, has not had good results with oral steroid medication, he has tried an inversion table but that made his symptoms worse in the past.  He would like to meet with Dr. Domínguez for further consideration if any surgical intervention may be appropriate.  Our clinic will call patient to arrange follow-up visit.    Main symptoms continue to be in the right leg but  also somewhat in the left leg as well.      IMPRESSION:  1.  There are varying degrees of mild to advanced lumbar disc degenerative change. No severe spinal canal stenosis.  2.  L2-L3 right central disc protrusion moderately narrows the right lateral recess and contacts the right L3 nerve root.  3.  Severe foraminal stenosis with nerve root impingement on the right at L3-L4.  4.  Moderate to severe foraminal stenosis bilaterally at L4-L5. Moderate foraminal stenosis on the left at L3-L4.

## 2023-12-07 ENCOUNTER — HOSPITAL ENCOUNTER (OUTPATIENT)
Dept: MRI IMAGING | Facility: HOSPITAL | Age: 73
Discharge: HOME | End: 2023-12-07
Attending: PHYSICIAN ASSISTANT | Admitting: PHYSICIAN ASSISTANT
Payer: COMMERCIAL

## 2023-12-07 DIAGNOSIS — Z98.890 S/P LUMBAR SPINE OPERATION: ICD-10-CM

## 2023-12-07 DIAGNOSIS — M54.41 ACUTE RIGHT-SIDED LOW BACK PAIN WITH RIGHT-SIDED SCIATICA: ICD-10-CM

## 2023-12-07 PROCEDURE — 72158 MRI LUMBAR SPINE W/O & W/DYE: CPT

## 2023-12-07 PROCEDURE — A9585 GADOBUTROL INJECTION: HCPCS | Mod: JZ | Performed by: PHYSICIAN ASSISTANT

## 2023-12-07 PROCEDURE — 255N000002 HC RX 255 OP 636: Mod: JZ | Performed by: PHYSICIAN ASSISTANT

## 2023-12-07 RX ORDER — GADOBUTROL 604.72 MG/ML
8 INJECTION INTRAVENOUS ONCE
Status: COMPLETED | OUTPATIENT
Start: 2023-12-07 | End: 2023-12-07

## 2023-12-07 RX ADMIN — GADOBUTROL 8 ML: 604.72 INJECTION INTRAVENOUS at 07:00

## 2023-12-11 ENCOUNTER — OFFICE VISIT (OUTPATIENT)
Dept: NEUROSURGERY | Facility: CLINIC | Age: 73
End: 2023-12-11
Payer: COMMERCIAL

## 2023-12-11 VITALS — HEART RATE: 53 BPM | OXYGEN SATURATION: 98 % | DIASTOLIC BLOOD PRESSURE: 96 MMHG | SYSTOLIC BLOOD PRESSURE: 181 MMHG

## 2023-12-11 DIAGNOSIS — M54.16 LUMBAR RADICULOPATHY: Primary | ICD-10-CM

## 2023-12-11 PROCEDURE — 99213 OFFICE O/P EST LOW 20 MIN: CPT | Performed by: NEUROLOGICAL SURGERY

## 2023-12-11 ASSESSMENT — PAIN SCALES - GENERAL: PAINLEVEL: NO PAIN (0)

## 2023-12-11 NOTE — NURSING NOTE
Tyson Fregoso is a 73 year old male who presents for:  Chief Complaint   Patient presents with    RECHECK     MRI review - PT says they are feeling much better today        Initial Vitals:  BP (!) 181/96   Pulse 53   SpO2 98%  Estimated body mass index is 24.41 kg/m  as calculated from the following:    Height as of 12/5/23: 6' (1.829 m).    Weight as of 12/5/23: 180 lb (81.6 kg).. There is no height or weight on file to calculate BSA. BP completed using cuff size: regular  No Pain (0)    Nursing Comments:       Ana Maria Kemp

## 2023-12-11 NOTE — LETTER
12/11/2023         RE: Tyson Fregoso  [address] [place] [address]        Dear Colleague,    Thank you for referring your patient, Tyson Fregoso, to the [facility] NEUROLOG[facility]. Please see a copy of my visit note below.    Neurosurgery follow-up    Mr. Fregoso is a 73-year-old male who underwent left L4-5 lateral recess decompression with Dr. Domínguez in September of 2020, he states he did very well after that surgery.  Prior to that he had had a surgery in 2001, and more recently had undergone L3-4 and left L4-5 hemilaminectomy in January 2020 with Dr. Ward.    Patient states he has done very well over the past 3 years, but over the past month he is starting to have increased back pain and pain going down his right leg, and an L4/L5 distribution.  It begins in his low back and radiates down his posterior lateral thigh and is painful in his knee..  He reports some occasional numbness and tingling in both of his feet.  He has been trying physical therapy exercises at home as directed to him previously.    Returns for follow up.  Since his appointment with Nic Boyle, he has started using his inversion table, and the radicular pain has resolved.    Exam    B/P: 172/88, T: Data Unavailable, P: 50, R: Data Unavailable     Alert and oriented no acute distress  Bilateral lower extremities with 5/5 strength  Reflexes 2+ patella/ankle  Negative straight leg raise bilaterally  Lumbar spine nontender to palpation  Able to stand on heels and toes  Gait is normal    Imaging    IMPRESSION:  1.  There are varying degrees of mild to advanced lumbar disc degenerative change. No severe spinal canal stenosis.  2.  L2-L3 right central disc protrusion moderately narrows the right lateral recess and contacts the right L3 nerve root.  3.  Severe foraminal stenosis with nerve root impingement on the right at L3-L4.  4.  Moderate to severe foraminal stenosis bilaterally at L4-L5.  Moderate foraminal stenosis on the left at L3-L4.    Assessment    History of prior lumbar surgeries  Right-sided low back pain  Right leg pain      Plan    Given the multiple prior decompressive surgeries, disc degeneration, and severe foraminal stenosis, particularly at L3-L4, we discussed that any further surgery would likely entail fusion  His symptoms have improved with use of the inversion table, and we recommended that he continue with this conservative approach at this point        Again, thank you for allowing me to participate in the care of your patient.        Sincerely,        Rai Domínguez MD

## 2023-12-12 NOTE — PROGRESS NOTES
Neurosurgery follow-up    Mr. Fregoso is a 73-year-old male who underwent left L4-5 lateral recess decompression with Dr. Domínguez in September of 2020, he states he did very well after that surgery.  Prior to that he had had a surgery in 2001, and more recently had undergone L3-4 and left L4-5 hemilaminectomy in January 2020 with Dr. Ward.    Patient states he has done very well over the past 3 years, but over the past month he is starting to have increased back pain and pain going down his right leg, and an L4/L5 distribution.  It begins in his low back and radiates down his posterior lateral thigh and is painful in his knee..  He reports some occasional numbness and tingling in both of his feet.  He has been trying physical therapy exercises at home as directed to him previously.    Returns for follow up.  Since his appointment with Nic Boyle, he has started using his inversion table, and the radicular pain has resolved.    Exam    B/P: 172/88, T: Data Unavailable, P: 50, R: Data Unavailable     Alert and oriented no acute distress  Bilateral lower extremities with 5/5 strength  Reflexes 2+ patella/ankle  Negative straight leg raise bilaterally  Lumbar spine nontender to palpation  Able to stand on heels and toes  Gait is normal    Imaging    IMPRESSION:  1.  There are varying degrees of mild to advanced lumbar disc degenerative change. No severe spinal canal stenosis.  2.  L2-L3 right central disc protrusion moderately narrows the right lateral recess and contacts the right L3 nerve root.  3.  Severe foraminal stenosis with nerve root impingement on the right at L3-L4.  4.  Moderate to severe foraminal stenosis bilaterally at L4-L5. Moderate foraminal stenosis on the left at L3-L4.    Assessment    History of prior lumbar surgeries  Right-sided low back pain  Right leg pain      Plan    Given the multiple prior decompressive surgeries, disc degeneration, and severe foraminal stenosis, particularly at L3-L4,  we discussed that any further surgery would likely entail fusion  His symptoms have improved with use of the inversion table, and we recommended that he continue with this conservative approach at this point

## 2025-04-07 ENCOUNTER — HOSPITAL ENCOUNTER (EMERGENCY)
Facility: CLINIC | Age: 75
Discharge: HOME | End: 2025-04-07
Attending: PHYSICIAN ASSISTANT | Admitting: PHYSICIAN ASSISTANT
Payer: COMMERCIAL

## 2025-04-07 ENCOUNTER — APPOINTMENT (OUTPATIENT)
Dept: GENERAL RADIOLOGY | Facility: CLINIC | Age: 75
End: 2025-04-07
Attending: PHYSICIAN ASSISTANT
Payer: COMMERCIAL

## 2025-04-07 VITALS
OXYGEN SATURATION: 100 % | DIASTOLIC BLOOD PRESSURE: 77 MMHG | RESPIRATION RATE: 18 BRPM | HEART RATE: 52 BPM | TEMPERATURE: 97.8 F | SYSTOLIC BLOOD PRESSURE: 124 MMHG

## 2025-04-07 DIAGNOSIS — R03.0 ELEVATED BLOOD PRESSURE READING: ICD-10-CM

## 2025-04-07 DIAGNOSIS — S61.421A LACERATION OF RIGHT HAND WITH FOREIGN BODY, INITIAL ENCOUNTER: ICD-10-CM

## 2025-04-07 PROCEDURE — 99283 EMERGENCY DEPT VISIT LOW MDM: CPT | Mod: 25

## 2025-04-07 PROCEDURE — 12002 RPR S/N/AX/GEN/TRNK2.6-7.5CM: CPT | Mod: RT

## 2025-04-07 PROCEDURE — 73130 X-RAY EXAM OF HAND: CPT | Mod: RT

## 2025-04-07 RX ORDER — CEPHALEXIN 500 MG/1
500 CAPSULE ORAL 4 TIMES DAILY
Qty: 28 CAPSULE | Refills: 0 | Status: SHIPPED | OUTPATIENT
Start: 2025-04-07 | End: 2025-04-14

## 2025-04-07 ASSESSMENT — COLUMBIA-SUICIDE SEVERITY RATING SCALE - C-SSRS
6. HAVE YOU EVER DONE ANYTHING, STARTED TO DO ANYTHING, OR PREPARED TO DO ANYTHING TO END YOUR LIFE?: NO
2. HAVE YOU ACTUALLY HAD ANY THOUGHTS OF KILLING YOURSELF IN THE PAST MONTH?: NO
1. IN THE PAST MONTH, HAVE YOU WISHED YOU WERE DEAD OR WISHED YOU COULD GO TO SLEEP AND NOT WAKE UP?: NO

## 2025-04-07 ASSESSMENT — ACTIVITIES OF DAILY LIVING (ADL)
ADLS_ACUITY_SCORE: 47
ADLS_ACUITY_SCORE: 47

## 2025-04-07 NOTE — ED TRIAGE NOTES
Pt presents to triage with laceration on right palm; pt having drinks and as he was leaving at 5:45pm , tried to grab a glass that was falling and cut right palm; hand wrapped, bleeding controlled on arrival.      Triage Assessment (Adult)       Row Name 04/07/25 1813          Triage Assessment    Airway WDL WDL        Respiratory WDL    Respiratory WDL WDL        Skin Circulation/Temperature WDL    Skin Circulation/Temperature WDL WDL        Cardiac WDL    Cardiac WDL WDL        Peripheral/Neurovascular WDL    Peripheral Neurovascular WDL WDL        Cognitive/Neuro/Behavioral WDL    Cognitive/Neuro/Behavioral WDL WDL                      trial meloxicam started 7.5 mg daily as needed for back pain with food and may increase to 15 mg if needed. I will see him in about a month to see if this is helping. Prescription for meloxicam given.   He will also continue his gabapentin

## 2025-04-08 NOTE — ED PROVIDER NOTES
History     Chief Complaint:  Laceration       HPI   Tyson Fregoso is a 75 year old male presents with laceration to his right hand as he excellently cut himself on a broken glass today.  His last tetanus was in 2022.  He is not on any major blood thinners.  He denies any distal numbness weakness of his right hand.      Independent Historian:        Review of External Notes:      Medications:    acetaminophen (TYLENOL) 500 MG tablet  cephALEXin (KEFLEX) 500 MG capsule  glucosamine-chondroitin 500-400 MG CAPS per capsule  methocarbamol (ROBAXIN) 500 MG tablet  methylPREDNISolone (MEDROL DOSEPAK) 4 MG tablet therapy pack  multivitamin w/minerals (MULTI-VITAMIN) tablet  oxyCODONE (ROXICODONE) 5 MG tablet  senna-docusate (SENOKOT-S/PERICOLACE) 8.6-50 MG tablet  Vitamin D, Cholecalciferol, 25 MCG (1000 UT) TABS        Past Medical History:    No past medical history on file.    Past Surgical History:    Past Surgical History:   Procedure Laterality Date    ARTHROPLASTY HIP Left 6/18/2020    Procedure: ARTHROPLASTY, HIP, TOTAL LEFT;  Surgeon: Logan Waters MD;  Location: [facility] OR    COLONOSCOPY      DECOMPRESSION LUMBAR MINIMALLY INVASIVE ONE LEVEL Left 9/23/2020    Procedure: Minimally Invasive Left Lumbar 4 to Lumbar 5 Lateral Recess Decompression;  Surgeon: Rai Domínguez MD;  Location: [facility] OR    INJECTION, ANESTHETIC/STEROID, TRANSFORAMINAL EPIDURAL; LUMBAR/SACRAL, SINGLE LEVEL  11/2019    LAMINECTOMY LUMBAR TWO LEVELS N/A 1/8/2020    Procedure: Lumbar 3 to 4 decompression and discectomy and left Lumbar 4-5 Hemilaminectomy;  Surgeon: Jan Ward MD;  Location: [facility] OR    microdiscectomy  2001    L4-5    SHOULDER SURGERY Bilateral     arthroscopies (Left 2005, right 2013)          Physical Exam   Patient Vitals for the past 24 hrs:   BP Temp Temp src Pulse Resp SpO2   04/07/25 2037 124/77 -- -- 52 -- 100 %   04/07/25 1812 (!) 176/81 97.8  F (36.6  C) Temporal 60 18 100 %        Physical  Exam  Musculoskeletal:        Hands:          General:Vitals signs reviewed  Psych: Normal Affect  Eyes: Non icteric , non inject  Psych: Normal Affect  Lungs: Non labored breathing  Skin: Linear laceration to the right hand over the palmar surface.  Wound was explored no obvious signs of foreign body or visible foreign body.  Neuro: Normal mentation, Sensation intact distal to injury. Normal strength of all 5 fingers of the right hand with full range of motion of the PIP DIP and MCP joints.  Vascular: Cap refill < 2 sec distal to injury, right radial pulse 2+  Musculoskeletal: Right hand: No bony tenderness with mild pain around the laceration site.  Full range of motion of the hand with full  can give okay sign and thumbs up.      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 No acute fracture. Small punctate opacities over the palm on the lateral view which    are nonspecific, though, may reflect small retained foreign bodies           Laboratory:  Labs Ordered and Resulted from Time of ED Arrival to Time of ED Departure - No data to display     Procedures       Laceration Repair      Procedure: Laceration Repair    Indication: Laceration    Consent: Verbal    Tetanus status reviewed    Location: Right Hand     Length: 3.5 cm    Preparation: Irrigation with Sterile Saline.    Anesthesia/Sedation: Bupivacaine - 0.5%      Treatment/Exploration: Wound explored, no foreign bodies found     Closure: The wound was closed with one layer. Skin/superficial layer was closed with 7 x 4-0 Nylon using Interrupted sutures.     Patient Status: The patient tolerated the procedure well: Yes. There were no complications.    Procedure performed by my PA student Neymar.  This was done under my direct  supervision.      Emergency Department Course & Assessments:    Interventions:  Medications - No data to display     Assessments:    Independent Interpretation (X-rays, CTs, rhythm strip):  Right hand x-ray: On the lateral view there is small opacity punctate questionable for potential small glass foreign bodies.  This I could not see within the wound.    Consultations/Discussion of Management or Tests:  None       Social Drivers of Health affecting care:  None     Disposition:  The patient was discharged.    Impression & Plan    CMS Diagnoses: None       Medical Decision Making:    This is a very pleasant 75 year old male who presented with a laceration to the right hand.  The wound was carefully evaluated and explored.  The laceration was closed with sutures as noted herein.  There is no evidence of muscular, tendon, or bony damage with this laceration.  X-ray imaging obtained showing questionable possible foreign bodies in the hand.  I could not visualize this at bedside.  These were not easily obtainable and removable at this time I would like to avoid further potential damage to the underlying structures of the hand and we will clean the wound close him.  He will be placed on prophylactic antibiotics and hand trauma call line has been utilized with recommended close follow-up with hand specialty.  Possible complications (infection, scarring) were reviewed with the patient.  Follow up with primary care will be indicated for suture removal in 7 days.  I advised strict return precautions for pain, redness or drainage to the site, bleeding, or any other concerning symptoms.        Diagnosis:    ICD-10-CM    1. Laceration of right hand with foreign body, initial encounter  S61.421A       2. Elevated blood pressure reading  R03.0            Discharge Medications:  Discharge Medication List as of 4/7/2025  8:35 PM        START taking these medications    Details   cephALEXin (KEFLEX) 500 MG capsule Take 1 capsule (500  mg) by mouth 4 times daily for 7 days., Disp-28 capsule, R-0, Local Print              4/7/2025   Raj Sanchez, Raj Dietz, SURY  04/07/25 5212

## (undated) DEVICE — SPONGE SURGIFOAM 50

## (undated) DEVICE — NDL SPINAL 18GA 3.5" 405184

## (undated) DEVICE — DRSG AQUACEL AG 3.5X9.75" HYDROFIBER 412011

## (undated) DEVICE — GLOVE PROTEXIS BLUE W/NEU-THERA 8.0  2D73EB80

## (undated) DEVICE — GOWN XLG DISP 9545

## (undated) DEVICE — TUBING SUCTION SOFT 20'X3/16" 0036570

## (undated) DEVICE — SOL WATER IRRIG 1000ML BOTTLE 2F7114

## (undated) DEVICE — SUCTION MANIFOLD DORNOCH ULTRA CART UL-CL500

## (undated) DEVICE — HOOD FLYTE W/PEELAWAY 408-800-100

## (undated) DEVICE — EYE PREP BETADINE 5% SOLUTION 30ML 0065-0411-30

## (undated) DEVICE — MIDAS REX DIFFUSER LUBRICANT LEGEND PA100-A

## (undated) DEVICE — SU VICRYL 2-0 CT-2 CR 8X18" J726D

## (undated) DEVICE — SYR 03ML LL W/O NDL 309657

## (undated) DEVICE — SOL ISOPROPYL RUBBING ALCOHOL USP 70% 4OZ HDX-20 I0020

## (undated) DEVICE — BONE WAX 2.5GM W31G

## (undated) DEVICE — SUCTION IRR SYSTEM W/O TIP INTERPULSE HANDPIECE 0210-100-000

## (undated) DEVICE — SU VICRYL 1 CT-1 36" J347H

## (undated) DEVICE — SU ETHIBOND 2 V-37 4X30" MX69G

## (undated) DEVICE — DRAPE STERI TOWEL LG 1010

## (undated) DEVICE — DECANTER TRANSFER DEVICE 2008S

## (undated) DEVICE — ESU ELEC BLADE 4" COATED

## (undated) DEVICE — SU NDL MAYO 1824-2

## (undated) DEVICE — SPONGE LAP 18X18" X8435

## (undated) DEVICE — BRUSH SURGICAL SCRUB W/4% CHLORHEXIDINE GLUCONATE SOL 4458A

## (undated) DEVICE — ESU PENCIL W/HOLSTER E2350H

## (undated) DEVICE — ADH SKIN CLOSURE PREMIERPRO EXOFIN 1.0ML 3470

## (undated) DEVICE — Device

## (undated) DEVICE — GLOVE PROTEXIS BLUE W/NEU-THERA 6.5  2D73EB65

## (undated) DEVICE — LINEN MAYO STAND COVER OVERSIZE PACK 5458

## (undated) DEVICE — SOL NACL 0.9% IRRIG 3000ML BAG 2B7477

## (undated) DEVICE — LINEN GOWN X4 5410

## (undated) DEVICE — POSITIONER ARMBOARD FOAM 1PAIR LF FP-ARMB1

## (undated) DEVICE — LINEN BACK PACK 5440

## (undated) DEVICE — RX SURGIFLO HEMOSTATIC MATRIX W/THROMBIN 8ML 2994

## (undated) DEVICE — SU PDS II 0 CT-1 27" Z340H

## (undated) DEVICE — ESU GROUND PAD UNIVERSAL W/O CORD

## (undated) DEVICE — DRAPE MAYO STAND 23X54 8337

## (undated) DEVICE — ESU PENCIL W/SMOKE EVAC NEPTUNE STRYKER 0703-046-000

## (undated) DEVICE — DRSG KERLIX FLUFFS X5

## (undated) DEVICE — ESU GROUND PAD ADULT W/CORD E7507

## (undated) DEVICE — SUCTION MINISQUAIR SMOKE EVAC CAPTURE DEVICE SQ20012-01

## (undated) DEVICE — GLOVE PROTEXIS W/NEU-THERA 7.5  2D73TE75

## (undated) DEVICE — ESU ELEC BLADE 6" COATED/INSULATED E1455-6

## (undated) DEVICE — DRAPE MICROSCOPE EQUIP 48X120" 6130VL2

## (undated) DEVICE — ADH LIQUID MASTISOL TOPICAL VIAL 2-3ML 0523-48

## (undated) DEVICE — GLOVE PROTEXIS W/NEU-THERA 6.5  2D73TE65

## (undated) DEVICE — DRAPE LAP W/ARMBOARD 29410

## (undated) DEVICE — GLOVE PROTEXIS BLUE W/NEU-THERA 8.5  2D73EB85

## (undated) DEVICE — SU VICRYL 2-0 CT-2 8X18" UND D8144

## (undated) DEVICE — BASIN SET MAJOR

## (undated) DEVICE — MIDAS REX DISSECTING TOOL 4MM BALL 140MM 14BA40

## (undated) DEVICE — ESU CORD BIPOLAR GREEN 10-4000

## (undated) DEVICE — SOL NACL 0.9% IRRIG 1000ML BOTTLE 2F7124

## (undated) DEVICE — GLOVE PROTEXIS W/NEU-THERA 8.5  2D73TE85

## (undated) DEVICE — SU MONOCRYL 4-0 PS-2 18" UND Y496G

## (undated) DEVICE — DRAPE IOBAN INCISE 36X23" 6651EZ

## (undated) DEVICE — PREP CHLORAPREP 26ML TINTED ORANGE  260815

## (undated) DEVICE — DRAPE POUCH INSTRUMENT 1018

## (undated) DEVICE — DRAPE IOBAN INCISE 23X17" 6650EZ

## (undated) DEVICE — DRSG AQUACEL AG 3.5X6.0" HYDROFIBER 412010

## (undated) DEVICE — SPONGE SURGIFOAM 12 1972

## (undated) DEVICE — SU STRATAFIX PDS PLUS 1 CT-1 18" SXPP1A404

## (undated) DEVICE — STRAP KNEE/BODY 31143004

## (undated) DEVICE — SUCTION FRAZIER 12FR W/HANDLE K73

## (undated) DEVICE — SU MONOCRYL 3-0 PS-1 27" Y936H

## (undated) DEVICE — DRAPE COVER C-ARM SEAMLESS SNAP-KAP 03-KP26 LATEX FREE

## (undated) DEVICE — LINEN TOWEL PACK X5 5464

## (undated) DEVICE — DRAPE SHEET REV FOLD 3/4 9349

## (undated) DEVICE — NDL 20GA 1.5"

## (undated) DEVICE — DRSG TEGADERM 4X4 3/4" 1626W

## (undated) DEVICE — SPONGE COTTONOID 1/2X1/2" 80-1400

## (undated) DEVICE — SU MONOCRYL 3-0 PS-2 18" UND Y497G

## (undated) DEVICE — NDL SPINAL 20GA 3.5" 405182

## (undated) DEVICE — GOWN IMPERVIOUS SPECIALTY XLG/XLONG 32474

## (undated) DEVICE — BONE CLEANING TIP INTERPULSE  0210-010-000

## (undated) DEVICE — MIDAS REX DISSECTING TOOL  14MH30

## (undated) DEVICE — STRAP STIRRUP W/SLIP 30187-030

## (undated) DEVICE — TUBING SUCTION MEDI-VAC SOFT 3/16"X20' N520A

## (undated) DEVICE — PREP DURAPREP 26ML APL 8630

## (undated) DEVICE — SU STRATAFIXÂ PDO 2-0 24CMX24CM MH DA SXPD2B408

## (undated) DEVICE — PACK TOTAL HIP W/POUCH RIVERSIDE LATEX FREE

## (undated) DEVICE — BLADE SAW SAGITTAL STRK 18X90X1.37MM HD SYS 6 6118-137-090

## (undated) DEVICE — SYR EAR BULB 3OZ 0035830

## (undated) DEVICE — GLOVE PROTEXIS POWDER FREE 8.0 ORTHOPEDIC 2D73ET80

## (undated) DEVICE — PACK SPINE SM CUSTOM SNE15SSFSK

## (undated) DEVICE — SU VICRYL 0 CT-2 CR 8X18" J727D

## (undated) DEVICE — SPONGE KITTNER 31001010

## (undated) DEVICE — POSITIONER PT PRONESAFE HEAD REST W/DERMAPROX INSERT 40599

## (undated) DEVICE — SU VICRYL 1 CT-1 CR 8X18" J741D

## (undated) DEVICE — SYR 50ML LL W/O NDL 309653

## (undated) RX ORDER — FENTANYL CITRATE 50 UG/ML
INJECTION, SOLUTION INTRAMUSCULAR; INTRAVENOUS
Status: DISPENSED
Start: 2020-01-08

## (undated) RX ORDER — ACETAMINOPHEN 325 MG/1
TABLET ORAL
Status: DISPENSED
Start: 2020-09-23

## (undated) RX ORDER — NEOSTIGMINE METHYLSULFATE 1 MG/ML
VIAL (ML) INJECTION
Status: DISPENSED
Start: 2020-09-23

## (undated) RX ORDER — KETOROLAC TROMETHAMINE 30 MG/ML
INJECTION, SOLUTION INTRAMUSCULAR; INTRAVENOUS
Status: DISPENSED
Start: 2020-01-08

## (undated) RX ORDER — PROPOFOL 10 MG/ML
INJECTION, EMULSION INTRAVENOUS
Status: DISPENSED
Start: 2020-09-23

## (undated) RX ORDER — PROPOFOL 10 MG/ML
INJECTION, EMULSION INTRAVENOUS
Status: DISPENSED
Start: 2020-06-18

## (undated) RX ORDER — EPHEDRINE SULFATE 50 MG/ML
INJECTION, SOLUTION INTRAMUSCULAR; INTRAVENOUS; SUBCUTANEOUS
Status: DISPENSED
Start: 2020-01-08

## (undated) RX ORDER — GABAPENTIN 100 MG/1
CAPSULE ORAL
Status: DISPENSED
Start: 2020-01-08

## (undated) RX ORDER — DEXAMETHASONE SODIUM PHOSPHATE 10 MG/ML
INJECTION, SOLUTION INTRAMUSCULAR; INTRAVENOUS
Status: DISPENSED
Start: 2020-08-24

## (undated) RX ORDER — METHYLPREDNISOLONE ACETATE 40 MG/ML
INJECTION, SUSPENSION INTRA-ARTICULAR; INTRALESIONAL; INTRAMUSCULAR; SOFT TISSUE
Status: DISPENSED
Start: 2020-09-23

## (undated) RX ORDER — CEFAZOLIN SODIUM 2 G/100ML
INJECTION, SOLUTION INTRAVENOUS
Status: DISPENSED
Start: 2020-06-18

## (undated) RX ORDER — VANCOMYCIN HYDROCHLORIDE 1 G/20ML
INJECTION, POWDER, LYOPHILIZED, FOR SOLUTION INTRAVENOUS
Status: DISPENSED
Start: 2020-01-08

## (undated) RX ORDER — GABAPENTIN 100 MG/1
CAPSULE ORAL
Status: DISPENSED
Start: 2020-06-18

## (undated) RX ORDER — ACETAMINOPHEN 325 MG/1
TABLET ORAL
Status: DISPENSED
Start: 2020-06-18

## (undated) RX ORDER — CEFAZOLIN SODIUM 2 G/100ML
INJECTION, SOLUTION INTRAVENOUS
Status: DISPENSED
Start: 2020-09-23

## (undated) RX ORDER — BUPIVACAINE HYDROCHLORIDE AND EPINEPHRINE 2.5; 5 MG/ML; UG/ML
INJECTION, SOLUTION INFILTRATION; PERINEURAL
Status: DISPENSED
Start: 2020-01-08

## (undated) RX ORDER — GLYCOPYRROLATE 0.2 MG/ML
INJECTION, SOLUTION INTRAMUSCULAR; INTRAVENOUS
Status: DISPENSED
Start: 2020-09-23

## (undated) RX ORDER — FENTANYL CITRATE 50 UG/ML
INJECTION, SOLUTION INTRAMUSCULAR; INTRAVENOUS
Status: DISPENSED
Start: 2020-09-23

## (undated) RX ORDER — ACETAMINOPHEN 325 MG/1
TABLET ORAL
Status: DISPENSED
Start: 2020-01-08

## (undated) RX ORDER — CELECOXIB 200 MG/1
CAPSULE ORAL
Status: DISPENSED
Start: 2020-06-18

## (undated) RX ORDER — LIDOCAINE HYDROCHLORIDE 20 MG/ML
INJECTION, SOLUTION EPIDURAL; INFILTRATION; INTRACAUDAL; PERINEURAL
Status: DISPENSED
Start: 2020-06-18

## (undated) RX ORDER — LIDOCAINE HYDROCHLORIDE 20 MG/ML
INJECTION, SOLUTION EPIDURAL; INFILTRATION; INTRACAUDAL; PERINEURAL
Status: DISPENSED
Start: 2020-09-23

## (undated) RX ORDER — LIDOCAINE HYDROCHLORIDE 10 MG/ML
INJECTION, SOLUTION EPIDURAL; INFILTRATION; INTRACAUDAL; PERINEURAL
Status: DISPENSED
Start: 2020-08-24

## (undated) RX ORDER — GABAPENTIN 100 MG/1
CAPSULE ORAL
Status: DISPENSED
Start: 2020-09-23

## (undated) RX ORDER — HYDROMORPHONE HYDROCHLORIDE 1 MG/ML
INJECTION, SOLUTION INTRAMUSCULAR; INTRAVENOUS; SUBCUTANEOUS
Status: DISPENSED
Start: 2020-09-23

## (undated) RX ORDER — DEXAMETHASONE SODIUM PHOSPHATE 4 MG/ML
INJECTION, SOLUTION INTRA-ARTICULAR; INTRALESIONAL; INTRAMUSCULAR; INTRAVENOUS; SOFT TISSUE
Status: DISPENSED
Start: 2020-09-23

## (undated) RX ORDER — GLYCOPYRROLATE 0.2 MG/ML
INJECTION INTRAMUSCULAR; INTRAVENOUS
Status: DISPENSED
Start: 2020-06-18

## (undated) RX ORDER — CEFAZOLIN SODIUM 2 G/100ML
INJECTION, SOLUTION INTRAVENOUS
Status: DISPENSED
Start: 2020-01-08

## (undated) RX ORDER — HYDROMORPHONE HYDROCHLORIDE 1 MG/ML
INJECTION, SOLUTION INTRAMUSCULAR; INTRAVENOUS; SUBCUTANEOUS
Status: DISPENSED
Start: 2020-06-18

## (undated) RX ORDER — ONDANSETRON 2 MG/ML
INJECTION INTRAMUSCULAR; INTRAVENOUS
Status: DISPENSED
Start: 2020-09-23

## (undated) RX ORDER — BUPIVACAINE HYDROCHLORIDE AND EPINEPHRINE 5; 5 MG/ML; UG/ML
INJECTION, SOLUTION EPIDURAL; INTRACAUDAL; PERINEURAL
Status: DISPENSED
Start: 2020-09-23

## (undated) RX ORDER — FENTANYL CITRATE 50 UG/ML
INJECTION, SOLUTION INTRAMUSCULAR; INTRAVENOUS
Status: DISPENSED
Start: 2020-06-18

## (undated) RX ORDER — KETOROLAC TROMETHAMINE 30 MG/ML
INJECTION, SOLUTION INTRAMUSCULAR; INTRAVENOUS
Status: DISPENSED
Start: 2020-06-18